# Patient Record
Sex: FEMALE | Race: WHITE | NOT HISPANIC OR LATINO | Employment: OTHER | URBAN - METROPOLITAN AREA
[De-identification: names, ages, dates, MRNs, and addresses within clinical notes are randomized per-mention and may not be internally consistent; named-entity substitution may affect disease eponyms.]

---

## 2023-04-14 RX ORDER — ROSUVASTATIN CALCIUM 20 MG/1
1 TABLET, COATED ORAL DAILY
COMMUNITY
Start: 2022-12-18 | End: 2023-04-25

## 2023-04-14 RX ORDER — ROSUVASTATIN CALCIUM 20 MG/1
TABLET, COATED ORAL
Qty: 90 TABLET | Refills: 0 | Status: CANCELLED | OUTPATIENT
Start: 2023-04-14

## 2023-04-24 RX ORDER — ROSUVASTATIN CALCIUM 20 MG/1
20 TABLET, COATED ORAL DAILY
Status: CANCELLED | OUTPATIENT
Start: 2023-04-24

## 2023-04-25 DIAGNOSIS — E78.5 HYPERLIPIDEMIA, UNSPECIFIED HYPERLIPIDEMIA TYPE: Primary | ICD-10-CM

## 2023-04-25 RX ORDER — ROSUVASTATIN CALCIUM 20 MG/1
TABLET, COATED ORAL
Qty: 15 TABLET | Refills: 0 | Status: SHIPPED | OUTPATIENT
Start: 2023-04-25 | End: 2023-05-16 | Stop reason: SDUPTHER

## 2023-05-11 DIAGNOSIS — E78.5 HYPERLIPIDEMIA, UNSPECIFIED HYPERLIPIDEMIA TYPE: ICD-10-CM

## 2023-05-11 RX ORDER — ROSUVASTATIN CALCIUM 20 MG/1
TABLET, COATED ORAL
Qty: 15 TABLET | Refills: 0 | Status: CANCELLED | OUTPATIENT
Start: 2023-05-11

## 2023-05-12 ENCOUNTER — PATIENT OUTREACH (OUTPATIENT)
Dept: PRIMARY CARE | Facility: CLINIC | Age: 83
End: 2023-05-12
Payer: MEDICARE

## 2023-05-16 DIAGNOSIS — E78.5 HYPERLIPIDEMIA, UNSPECIFIED HYPERLIPIDEMIA TYPE: ICD-10-CM

## 2023-05-16 RX ORDER — ROSUVASTATIN CALCIUM 20 MG/1
20 TABLET, COATED ORAL DAILY
Qty: 30 TABLET | Refills: 0 | Status: SHIPPED | OUTPATIENT
Start: 2023-05-16 | End: 2023-07-24 | Stop reason: SDUPTHER

## 2023-05-25 ENCOUNTER — PATIENT OUTREACH (OUTPATIENT)
Dept: PRIMARY CARE | Facility: CLINIC | Age: 83
End: 2023-05-25
Payer: MEDICARE

## 2023-05-25 ENCOUNTER — TELEPHONE (OUTPATIENT)
Dept: PRIMARY CARE | Facility: CLINIC | Age: 83
End: 2023-05-25
Payer: MEDICARE

## 2023-05-25 NOTE — PROGRESS NOTES
Patient due for annual wellness visit.  Patient not due until sept. Patient having other health issues an waiting for transportation help as well patient has  follow up visit June 1st after hospital discharge

## 2023-06-09 ENCOUNTER — DOCUMENTATION (OUTPATIENT)
Dept: PRIMARY CARE | Facility: CLINIC | Age: 83
End: 2023-06-09
Payer: MEDICARE

## 2023-06-12 ENCOUNTER — PATIENT OUTREACH (OUTPATIENT)
Dept: PRIMARY CARE | Facility: CLINIC | Age: 83
End: 2023-06-12
Payer: MEDICARE

## 2023-06-12 DIAGNOSIS — I73.9 PERIPHERAL VASCULAR DISEASE (CMS-HCC): ICD-10-CM

## 2023-06-12 NOTE — PROGRESS NOTES
TCM complete.  Discharge date 6/8/23   2 attempts were made to reach patient to assess needs.   No return call as of this note.   If patient schedules follow up within 14 days of discharge, visit is TCM billable.  Message sent to practice clinical pool to reach out to patient and schedule an appointment within 7-13 days from discharge date.    If patient meets criteria for moderately complex & has follow-up within 14 days-can bill [id number].   If patient meets criteria for highly complex & has follow-up visit within 7 days-can bill [id number].    *virtual follow up needs modifier added (95 or GT)   *AWV AND TCM CAN BE BILLED TOGETHER WITH 25 MODIFIER

## 2023-06-30 ENCOUNTER — PATIENT OUTREACH (OUTPATIENT)
Dept: PRIMARY CARE | Facility: CLINIC | Age: 83
End: 2023-06-30
Payer: MEDICARE

## 2023-06-30 NOTE — PROGRESS NOTES
Unable to reach patient for call back 14 days post discharge from the hospital.M with call back number for patient to call if needed   If no voicemail available call attempts x 2 were made to contact the patient to assist with any questions or concerns patient may have. Patient did not follow up with their PCP within that time. Patient dis-enrolled from [facility] this date.

## 2023-07-24 DIAGNOSIS — E78.5 HYPERLIPIDEMIA, UNSPECIFIED HYPERLIPIDEMIA TYPE: ICD-10-CM

## 2023-07-24 RX ORDER — ROSUVASTATIN CALCIUM 20 MG/1
20 TABLET, COATED ORAL DAILY
Qty: 30 TABLET | Refills: 0 | Status: SHIPPED | OUTPATIENT
Start: 2023-07-24 | End: 2023-08-09 | Stop reason: ALTCHOICE

## 2023-08-08 ENCOUNTER — APPOINTMENT (OUTPATIENT)
Dept: PRIMARY CARE | Facility: CLINIC | Age: 83
End: 2023-08-08
Payer: MEDICARE

## 2023-08-08 PROBLEM — D53.9 ANEMIA, MACROCYTIC: Status: ACTIVE | Noted: 2023-08-08

## 2023-08-08 PROBLEM — M48.061 LUMBAR CANAL STENOSIS: Status: ACTIVE | Noted: 2023-08-08

## 2023-08-08 PROBLEM — I70.229 CRITICAL LOWER LIMB ISCHEMIA (MULTI): Status: ACTIVE | Noted: 2023-08-08

## 2023-08-08 PROBLEM — K21.9 ESOPHAGEAL REFLUX: Status: ACTIVE | Noted: 2023-08-08

## 2023-08-08 PROBLEM — E78.5 HYPERLIPIDEMIA: Status: ACTIVE | Noted: 2023-08-08

## 2023-08-08 PROBLEM — L30.9 DERMATITIS: Status: ACTIVE | Noted: 2023-08-08

## 2023-08-08 PROBLEM — C79.51 BREAST CANCER METASTASIZED TO BONE (MULTI): Status: ACTIVE | Noted: 2023-08-08

## 2023-08-08 PROBLEM — M12.9 ARTHRITIS, MULTIPLE JOINT INVOLVEMENT: Status: ACTIVE | Noted: 2023-08-08

## 2023-08-08 PROBLEM — R60.0 EDEMA LEG: Status: ACTIVE | Noted: 2023-08-08

## 2023-08-08 PROBLEM — L97.529 ULCER OF LEFT FOOT (MULTI): Status: RESOLVED | Noted: 2023-08-08 | Resolved: 2023-08-08

## 2023-08-08 PROBLEM — Z98.890 STATUS POST LEFT BREAST LUMPECTOMY: Status: ACTIVE | Noted: 2023-08-08

## 2023-08-08 PROBLEM — I87.2 CHRONIC VENOUS INSUFFICIENCY: Status: ACTIVE | Noted: 2023-08-08

## 2023-08-08 PROBLEM — M48.02 CERVICAL SPINAL STENOSIS: Status: ACTIVE | Noted: 2023-08-08

## 2023-08-08 PROBLEM — G47.33 OBSTRUCTIVE SLEEP APNEA, ADULT: Status: ACTIVE | Noted: 2023-08-08

## 2023-08-08 PROBLEM — C50.919 BREAST CANCER METASTASIZED TO BONE (MULTI): Status: ACTIVE | Noted: 2023-08-08

## 2023-08-08 PROBLEM — M54.32 SCIATICA OF LEFT SIDE: Status: ACTIVE | Noted: 2023-08-08

## 2023-08-08 PROBLEM — L85.3 XEROSIS OF SKIN: Status: ACTIVE | Noted: 2023-08-08

## 2023-08-08 PROBLEM — I50.32 CHRONIC DIASTOLIC CONGESTIVE HEART FAILURE (MULTI): Status: ACTIVE | Noted: 2023-08-08

## 2023-08-08 PROBLEM — I10 BENIGN ESSENTIAL HYPERTENSION: Status: ACTIVE | Noted: 2023-08-08

## 2023-08-08 PROBLEM — R26.89 BALANCE DISORDER: Status: ACTIVE | Noted: 2023-08-08

## 2023-08-08 PROBLEM — I73.9 PERIPHERAL VASCULAR DISEASE (CMS-HCC): Status: ACTIVE | Noted: 2023-08-08

## 2023-08-08 PROBLEM — H40.9 MILD STAGE GLAUCOMA: Status: ACTIVE | Noted: 2023-08-08

## 2023-08-08 PROBLEM — Z96.1 PSEUDOPHAKIA, BOTH EYES: Status: ACTIVE | Noted: 2023-08-08

## 2023-08-08 PROBLEM — C77.3 CARCINOMA OF LEFT BREAST METASTATIC TO AXILLARY LYMPH NODE (MULTI): Status: ACTIVE | Noted: 2023-08-08

## 2023-08-08 PROBLEM — C50.912 CARCINOMA OF LEFT BREAST METASTATIC TO AXILLARY LYMPH NODE (MULTI): Status: ACTIVE | Noted: 2023-08-08

## 2023-08-08 PROBLEM — I70.25: Status: ACTIVE | Noted: 2023-08-08

## 2023-08-08 PROBLEM — G62.9 PERIPHERAL POLYNEUROPATHY: Status: ACTIVE | Noted: 2023-08-08

## 2023-08-08 PROBLEM — R06.02 SHORTNESS OF BREATH: Status: RESOLVED | Noted: 2023-08-08 | Resolved: 2023-08-08

## 2023-08-09 ENCOUNTER — OFFICE VISIT (OUTPATIENT)
Dept: PRIMARY CARE | Facility: CLINIC | Age: 83
End: 2023-08-09
Payer: MEDICARE

## 2023-08-09 VITALS
HEIGHT: 70 IN | OXYGEN SATURATION: 94 % | HEART RATE: 85 BPM | DIASTOLIC BLOOD PRESSURE: 54 MMHG | SYSTOLIC BLOOD PRESSURE: 104 MMHG | BODY MASS INDEX: 30.13 KG/M2

## 2023-08-09 DIAGNOSIS — G89.3 PAIN, CANCER: ICD-10-CM

## 2023-08-09 DIAGNOSIS — R73.9 HYPERGLYCEMIA: ICD-10-CM

## 2023-08-09 DIAGNOSIS — E78.5 HYPERLIPIDEMIA, UNSPECIFIED HYPERLIPIDEMIA TYPE: ICD-10-CM

## 2023-08-09 DIAGNOSIS — R26.89 BALANCE DISORDER: ICD-10-CM

## 2023-08-09 DIAGNOSIS — I50.32 CHRONIC DIASTOLIC CONGESTIVE HEART FAILURE (MULTI): ICD-10-CM

## 2023-08-09 DIAGNOSIS — I73.9 PERIPHERAL VASCULAR DISEASE (CMS-HCC): ICD-10-CM

## 2023-08-09 DIAGNOSIS — C50.919 CARCINOMA OF BREAST METASTATIC TO BONE, UNSPECIFIED LATERALITY (MULTI): Primary | ICD-10-CM

## 2023-08-09 DIAGNOSIS — C79.51 CARCINOMA OF BREAST METASTATIC TO BONE, UNSPECIFIED LATERALITY (MULTI): Primary | ICD-10-CM

## 2023-08-09 DIAGNOSIS — I10 BENIGN ESSENTIAL HYPERTENSION: ICD-10-CM

## 2023-08-09 PROCEDURE — 1036F TOBACCO NON-USER: CPT | Performed by: INTERNAL MEDICINE

## 2023-08-09 PROCEDURE — 1125F AMNT PAIN NOTED PAIN PRSNT: CPT | Performed by: INTERNAL MEDICINE

## 2023-08-09 PROCEDURE — 1159F MED LIST DOCD IN RCRD: CPT | Performed by: INTERNAL MEDICINE

## 2023-08-09 PROCEDURE — 1160F RVW MEDS BY RX/DR IN RCRD: CPT | Performed by: INTERNAL MEDICINE

## 2023-08-09 PROCEDURE — 3078F DIAST BP <80 MM HG: CPT | Performed by: INTERNAL MEDICINE

## 2023-08-09 PROCEDURE — 99214 OFFICE O/P EST MOD 30 MIN: CPT | Performed by: INTERNAL MEDICINE

## 2023-08-09 PROCEDURE — 3074F SYST BP LT 130 MM HG: CPT | Performed by: INTERNAL MEDICINE

## 2023-08-09 RX ORDER — ROSUVASTATIN CALCIUM 20 MG/1
1 TABLET, COATED ORAL DAILY
COMMUNITY
Start: 2020-08-17 | End: 2023-08-09 | Stop reason: ALTCHOICE

## 2023-08-09 RX ORDER — CLOPIDOGREL BISULFATE 75 MG/1
1 TABLET ORAL DAILY
Status: ON HOLD | COMMUNITY
Start: 2022-09-13 | End: 2023-10-16 | Stop reason: WASHOUT

## 2023-08-09 RX ORDER — METOPROLOL SUCCINATE 50 MG/1
50 TABLET, EXTENDED RELEASE ORAL DAILY
COMMUNITY
End: 2023-10-25 | Stop reason: HOSPADM

## 2023-08-09 RX ORDER — OXYBUTYNIN CHLORIDE 5 MG/1
1 TABLET, EXTENDED RELEASE ORAL DAILY
Status: ON HOLD | COMMUNITY
End: 2023-10-16 | Stop reason: WASHOUT

## 2023-08-09 RX ORDER — OXYCODONE HYDROCHLORIDE 5 MG/1
5 TABLET ORAL EVERY 6 HOURS PRN
Qty: 120 TABLET | Refills: 0 | Status: SHIPPED | OUTPATIENT
Start: 2023-08-09 | End: 2023-09-08

## 2023-08-09 RX ORDER — KETOROLAC TROMETHAMINE 5 MG/ML
SOLUTION OPHTHALMIC
Status: ON HOLD | COMMUNITY
Start: 2022-10-18 | End: 2023-10-16 | Stop reason: WASHOUT

## 2023-08-09 RX ORDER — FOLIC ACID/MULTIVIT,IRON,MINER 0.4MG-18MG
800 TABLET ORAL
Qty: 180 TABLET | Refills: 3 | Status: SHIPPED | OUTPATIENT
Start: 2023-08-09 | End: 2023-11-30

## 2023-08-09 RX ORDER — AMLODIPINE BESYLATE 5 MG/1
1 TABLET ORAL DAILY
Status: ON HOLD | COMMUNITY
Start: 2022-09-13 | End: 2023-10-16 | Stop reason: WASHOUT

## 2023-08-09 RX ORDER — ACETAMINOPHEN 500 MG
1 TABLET ORAL DAILY
COMMUNITY
End: 2023-09-06 | Stop reason: ENTERED-IN-ERROR

## 2023-08-09 RX ORDER — PANTOPRAZOLE SODIUM 40 MG/1
TABLET, DELAYED RELEASE ORAL
Status: ON HOLD | COMMUNITY
Start: 2022-09-11 | End: 2023-10-16 | Stop reason: WASHOUT

## 2023-08-09 RX ORDER — ROSUVASTATIN CALCIUM 20 MG/1
20 TABLET, COATED ORAL DAILY
Qty: 90 TABLET | Refills: 0 | Status: SHIPPED | OUTPATIENT
Start: 2023-08-09 | End: 2023-11-30

## 2023-08-09 RX ORDER — LATANOPROST 50 UG/ML
SOLUTION/ DROPS OPHTHALMIC
COMMUNITY
End: 2023-10-11 | Stop reason: SDUPTHER

## 2023-08-09 ASSESSMENT — ENCOUNTER SYMPTOMS
CONSTITUTIONAL NEGATIVE: 1
LOSS OF SENSATION IN FEET: 0
DEPRESSION: 0
OCCASIONAL FEELINGS OF UNSTEADINESS: 1

## 2023-08-09 ASSESSMENT — PATIENT HEALTH QUESTIONNAIRE - PHQ9
1. LITTLE INTEREST OR PLEASURE IN DOING THINGS: NOT AT ALL
SUM OF ALL RESPONSES TO PHQ9 QUESTIONS 1 AND 2: 0
2. FEELING DOWN, DEPRESSED OR HOPELESS: NOT AT ALL

## 2023-08-09 ASSESSMENT — LIFESTYLE VARIABLES
SKIP TO QUESTIONS 9-10: 0
HOW MANY STANDARD DRINKS CONTAINING ALCOHOL DO YOU HAVE ON A TYPICAL DAY: 1 OR 2
HOW OFTEN DO YOU HAVE A DRINK CONTAINING ALCOHOL: MONTHLY OR LESS
AUDIT-C TOTAL SCORE: 2
HOW OFTEN DO YOU HAVE SIX OR MORE DRINKS ON ONE OCCASION: LESS THAN MONTHLY

## 2023-08-09 NOTE — PATIENT INSTRUCTIONS

## 2023-08-09 NOTE — PROGRESS NOTES
"Subjective   Patient ID: Samira López is a 82 y.o. female who presents for Follow-up (refills).  HPI    FU FOR   REFILLS.   WAS ADM  TO HOSPITAL  NAD NEVER HAD DISCHARGE FOLLOW UP.   NON HEALING FOOT  ULCER  .  PROB  BYPASS  REQUIRED   SEEING WOUND CARE ONCE A WEEK.   SEES SEL[facility] GRISSOM.  SHE ALREADY HAS A STENT BUT DOESN'T THINK IT IS FUNCTIONING.       BREAST CA WITH BONE MTS TO RIBS, HIP AND  VERTEBRA.  She reprots her cancer is not getting worse.   SHE WANTS  OXYCODONE FOR PAIN.       Review of Systems   Constitutional: Negative.    All other systems reviewed and are negative.      Objective     BP Readings from Last 3 Encounters:   08/09/23 104/54   01/12/23 126/64   12/13/22 (!) 96/44      Wt Readings from Last 3 Encounters:   01/12/23 95.3 kg (210 lb)   12/13/22 95.3 kg (210 lb)   10/25/22 98.4 kg (217 lb)      BMI: Estimated body mass index is 30.13 kg/m² as calculated from the following:    Height as of this encounter: 1.778 m (5' 10\").    Weight as of 1/12/23: 95.3 kg (210 lb).    BSA: Estimated body surface area is 2.17 meters squared as calculated from the following:    Height as of this encounter: 1.778 m (5' 10\").    Weight as of 1/12/23: 95.3 kg (210 lb).  Physical Exam  Vitals and nursing note reviewed.   Constitutional:       Appearance: Normal appearance.   HENT:      Head: Normocephalic and atraumatic.      Nose: Nose normal.   Eyes:      Conjunctiva/sclera: Conjunctivae normal.   Pulmonary:      Effort: Pulmonary effort is normal.   Skin:     General: Skin is dry.   Neurological:      General: No focal deficit present.      Mental Status: She is alert and oriented to person, place, and time. Mental status is at baseline.   Psychiatric:         Mood and Affect: Mood normal.         Behavior: Behavior normal.         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 (CMS/HCC) - Primary    Relevant Medications    oxyCODONE (Roxicodone) 5 mg immediate release tablet    Chronic diastolic congestive heart failure (CMS/HCC)    Relevant Medications    amLODIPine (Norvasc) 5 mg tablet    clopidogrel (Plavix) 75 mg tablet    metoprolol succinate XL (Toprol-XL) 50 mg 24 hr tablet    Hyperlipidemia    Relevant Medications    rosuvastatin (Crestor) 20 mg tablet    Peripheral vascular disease (CMS/HCC)     Other Visit Diagnoses       Pain, cancer        Relevant Medications    oxyCODONE (Roxicodone) 5 mg immediate release tablet    Hyperglycemia        Relevant Orders    Hemoglobin A1c    Basic metabolic panel           Patient was identified as a fall risk. Risk prevention instructions provided.    Chronic problems controlled  on current treatment  unless otherwise noted.     CHART  REVIEWED AND  RECONCILED WITH OLD DATA / UPDATED IN NEW SYSTEM:  MEDS,  PROBLEMS,  ALLERGIES, SOC HISTORY.

## 2023-09-06 PROBLEM — E66.09 CLASS 1 OBESITY DUE TO EXCESS CALORIES WITH BODY MASS INDEX (BMI) OF 31.0 TO 31.9 IN ADULT: Status: ACTIVE | Noted: 2023-09-06

## 2023-09-06 PROBLEM — I95.9 LOW BLOOD PRESSURE: Status: ACTIVE | Noted: 2023-09-06

## 2023-09-06 PROBLEM — N18.30 STAGE 3 CHRONIC KIDNEY DISEASE (MULTI): Status: ACTIVE | Noted: 2022-06-23

## 2023-09-06 PROBLEM — E11.622 TYPE 2 DIABETES MELLITUS WITH ULCER (MULTI): Status: ACTIVE | Noted: 2023-09-06

## 2023-09-06 PROBLEM — J96.11 CHRONIC RESPIRATORY FAILURE WITH HYPOXIA (MULTI): Status: ACTIVE | Noted: 2023-09-06

## 2023-09-06 PROBLEM — I96 GANGRENE OF FOOT (MULTI): Status: ACTIVE | Noted: 2023-09-06

## 2023-09-06 PROBLEM — S81.809A OPEN WOUND OF KNEE, LEG (EXCEPT THIGH), AND ANKLE: Status: ACTIVE | Noted: 2023-09-06

## 2023-09-06 PROBLEM — E11.69 DIABETES MELLITUS TYPE 2 IN OBESE: Status: ACTIVE | Noted: 2023-09-06

## 2023-09-06 PROBLEM — L03.116 CELLULITIS OF LEFT LOWER LIMB: Status: ACTIVE | Noted: 2022-06-23

## 2023-09-06 PROBLEM — Z91.81 HISTORY OF FALLING: Status: ACTIVE | Noted: 2022-06-23

## 2023-09-06 PROBLEM — E11.51 TYPE 2 DM WITH DIABETIC PERIPHERAL ANGIOPATHY W/O GANGRENE (MULTI): Status: ACTIVE | Noted: 2022-06-23

## 2023-09-06 PROBLEM — Z79.01 LONG TERM (CURRENT) USE OF ANTICOAGULANTS: Status: ACTIVE | Noted: 2022-06-23

## 2023-09-06 PROBLEM — E78.5 HYPERLIPIDEMIA, UNSPECIFIED: Status: ACTIVE | Noted: 2022-06-23

## 2023-09-06 PROBLEM — M15.0 PRIMARY GENERALIZED (OSTEO)ARTHRITIS: Status: ACTIVE | Noted: 2022-06-23

## 2023-09-06 PROBLEM — Z87.891 PERSONAL HISTORY OF NICOTINE DEPENDENCE: Status: ACTIVE | Noted: 2022-06-23

## 2023-09-06 PROBLEM — H40.1131 PRIMARY OPEN ANGLE GLAUCOMA (POAG) OF BOTH EYES, MILD STAGE: Status: ACTIVE | Noted: 2023-09-06

## 2023-09-06 PROBLEM — I95.9 HYPOTENSION: Status: ACTIVE | Noted: 2023-09-06

## 2023-09-06 PROBLEM — H40.9 GLAUCOMA: Status: ACTIVE | Noted: 2022-06-23

## 2023-09-06 PROBLEM — R93.89 ABNORMAL COMPUTED TOMOGRAPHY SCAN: Status: ACTIVE | Noted: 2023-09-06

## 2023-09-06 PROBLEM — R68.89 COLD FEELING: Status: ACTIVE | Noted: 2023-09-06

## 2023-09-06 PROBLEM — E11.22 TYPE 2 DIABETES MELLITUS WITH DIABETIC CHRONIC KIDNEY DISEASE (MULTI): Status: ACTIVE | Noted: 2022-06-23

## 2023-09-06 PROBLEM — M62.81 MUSCLE WEAKNESS: Status: ACTIVE | Noted: 2023-05-24

## 2023-09-06 PROBLEM — I50.32 CHRONIC DIASTOLIC (CONGESTIVE) HEART FAILURE (MULTI): Status: ACTIVE | Noted: 2022-06-23

## 2023-09-06 PROBLEM — S81.831D: Status: ACTIVE | Noted: 2023-09-06

## 2023-09-06 PROBLEM — I99.8 LIMB ISCHEMIA: Status: ACTIVE | Noted: 2023-09-06

## 2023-09-06 PROBLEM — C79.51 SECONDARY MALIGNANT NEOPLASM OF BONE (MULTI): Status: ACTIVE | Noted: 2022-06-23

## 2023-09-06 PROBLEM — I26.99 OTHER PULMONARY EMBOLISM WITHOUT ACUTE COR PULMONALE (MULTI): Status: ACTIVE | Noted: 2022-06-23

## 2023-09-06 PROBLEM — I87.8 VENOUS STASIS: Status: ACTIVE | Noted: 2023-09-06

## 2023-09-06 PROBLEM — L02.416 CUTANEOUS ABSCESS OF LEFT LOWER LIMB: Status: ACTIVE | Noted: 2022-06-23

## 2023-09-06 PROBLEM — S91.009A OPEN WOUND OF KNEE, LEG (EXCEPT THIGH), AND ANKLE: Status: ACTIVE | Noted: 2023-09-06

## 2023-09-06 PROBLEM — L98.499 TYPE 2 DIABETES MELLITUS WITH ULCER (MULTI): Status: ACTIVE | Noted: 2023-09-06

## 2023-09-06 PROBLEM — L85.3 ASTEATOSIS CUTIS: Status: ACTIVE | Noted: 2023-09-06

## 2023-09-06 PROBLEM — L02.419 CELLULITIS AND ABSCESS OF LOWER EXTREMITY: Status: ACTIVE | Noted: 2023-09-06

## 2023-09-06 PROBLEM — R09.02 HYPOXIA: Status: ACTIVE | Noted: 2023-09-06

## 2023-09-06 PROBLEM — Z85.3 HISTORY OF MALIGNANT NEOPLASM OF BREAST: Status: ACTIVE | Noted: 2023-09-06

## 2023-09-06 PROBLEM — S91.332A: Status: ACTIVE | Noted: 2023-09-06

## 2023-09-06 PROBLEM — L97.522 NON-PRESSURE CHRONIC ULCER OF OTHER PART OF LEFT FOOT WITH FAT LAYER EXPOSED (MULTI): Status: ACTIVE | Noted: 2023-09-06

## 2023-09-06 PROBLEM — Z98.890 OTHER SPECIFIED POSTPROCEDURAL STATES: Status: ACTIVE | Noted: 2022-06-23

## 2023-09-06 PROBLEM — M79.673 FOOT PAIN: Status: ACTIVE | Noted: 2023-09-06

## 2023-09-06 PROBLEM — I73.9 PERIPHERAL ARTERIAL DISEASE (CMS-HCC): Status: ACTIVE | Noted: 2023-09-06

## 2023-09-06 PROBLEM — B37.2 CANDIDAL INTERTRIGO: Status: ACTIVE | Noted: 2023-09-06

## 2023-09-06 PROBLEM — D63.1 ANEMIA DUE TO CHRONIC KIDNEY DISEASE: Status: ACTIVE | Noted: 2022-06-23

## 2023-09-06 PROBLEM — M25.559 HIP PAIN: Status: ACTIVE | Noted: 2023-09-06

## 2023-09-06 PROBLEM — C77.3 SECONDARY AND UNSPECIFIED MALIGNANT NEOPLASM OF AXILLA AND UPPER LIMB LYMPH NODES (MULTI): Status: ACTIVE | Noted: 2022-06-23

## 2023-09-06 PROBLEM — S90.529A BLISTER OF ANKLE: Status: ACTIVE | Noted: 2023-09-06

## 2023-09-06 PROBLEM — S80.829A BLISTER OF LEG: Status: ACTIVE | Noted: 2023-09-06

## 2023-09-06 PROBLEM — D05.02 LOBULAR CARCINOMA IN SITU OF LEFT BREAST: Status: ACTIVE | Noted: 2022-06-23

## 2023-09-06 PROBLEM — G92.8 TOXIC METABOLIC ENCEPHALOPATHY: Status: ACTIVE | Noted: 2023-09-06

## 2023-09-06 PROBLEM — Z86.711 HISTORY OF PULMONARY EMBOLISM: Status: ACTIVE | Noted: 2023-09-06

## 2023-09-06 PROBLEM — C50.919 MALIGNANT NEOPLASM OF UNSPECIFIED SITE OF UNSPECIFIED FEMALE BREAST (MULTI): Status: ACTIVE | Noted: 2022-06-23

## 2023-09-06 PROBLEM — Z51.5 PALLIATIVE CARE STATUS: Status: ACTIVE | Noted: 2023-09-06

## 2023-09-06 PROBLEM — D53.9 NUTRITIONAL ANEMIA: Status: ACTIVE | Noted: 2022-06-23

## 2023-09-06 PROBLEM — E66.9 OBESITY: Status: ACTIVE | Noted: 2022-06-23

## 2023-09-06 PROBLEM — I13.0 HYPERTENSIVE HEART AND CHRONIC KIDNEY DISEASE WITH HEART FAILURE AND STAGE 1 THROUGH STAGE 4 CHRONIC KIDNEY DISEASE, OR CHRONIC KIDNEY DISEASE (MULTI): Status: ACTIVE | Noted: 2022-06-23

## 2023-09-06 PROBLEM — F41.9 ANXIETY: Status: ACTIVE | Noted: 2023-09-06

## 2023-09-06 PROBLEM — C44.92 SCC (SQUAMOUS CELL CARCINOMA): Status: ACTIVE | Noted: 2023-09-06

## 2023-09-06 PROBLEM — A41.9 SEPSIS (MULTI): Status: ACTIVE | Noted: 2023-09-06

## 2023-09-06 PROBLEM — M79.662 PAIN OF LEFT LOWER LEG: Status: ACTIVE | Noted: 2023-09-06

## 2023-09-06 PROBLEM — M79.676 PAIN IN TOE: Status: ACTIVE | Noted: 2023-09-06

## 2023-09-06 PROBLEM — R53.1 ASTHENIA: Status: ACTIVE | Noted: 2023-09-06

## 2023-09-06 PROBLEM — S81.009A OPEN WOUND OF KNEE, LEG (EXCEPT THIGH), AND ANKLE: Status: ACTIVE | Noted: 2023-09-06

## 2023-09-06 PROBLEM — L03.119 CELLULITIS AND ABSCESS OF LOWER EXTREMITY: Status: ACTIVE | Noted: 2023-09-06

## 2023-09-06 PROBLEM — D61.818 PANCYTOPENIA (MULTI): Status: ACTIVE | Noted: 2023-09-06

## 2023-09-06 PROBLEM — L03.90 CELLULITIS: Status: ACTIVE | Noted: 2023-09-06

## 2023-09-06 PROBLEM — R06.00 DYSPNEA: Status: ACTIVE | Noted: 2023-09-06

## 2023-09-06 PROBLEM — G47.33 OSA (OBSTRUCTIVE SLEEP APNEA): Status: ACTIVE | Noted: 2022-06-23
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
## 2023-09-06 PROBLEM — H40.003 GLAUCOMA SUSPECT, BOTH EYES: Status: ACTIVE | Noted: 2023-09-06

## 2023-09-06 PROBLEM — J44.9 CHRONIC OBSTRUCTIVE LUNG DISEASE (MULTI): Status: ACTIVE | Noted: 2023-09-06

## 2023-09-06 PROBLEM — R60.0 EDEMA OF LOWER EXTREMITY: Status: ACTIVE | Noted: 2023-09-06

## 2023-09-06 PROBLEM — M54.32 SCIATICA, LEFT SIDE: Status: ACTIVE | Noted: 2022-06-23

## 2023-09-06 PROBLEM — E66.9 DIABETES MELLITUS TYPE 2 IN OBESE: Status: ACTIVE | Noted: 2023-09-06

## 2023-09-06 PROBLEM — L97.529 ULCER OF LEFT FOOT (MULTI): Status: ACTIVE | Noted: 2023-09-06

## 2023-09-06 PROBLEM — E87.5 HYPERKALEMIA: Status: ACTIVE | Noted: 2023-09-06

## 2023-09-06 PROBLEM — I96 GANGRENE (MULTI): Status: ACTIVE | Noted: 2023-09-06

## 2023-09-06 PROBLEM — H40.1132 PRIMARY OPEN ANGLE GLAUCOMA (POAG) OF BOTH EYES, MODERATE STAGE: Status: ACTIVE | Noted: 2023-09-06

## 2023-09-06 PROBLEM — N18.9 ANEMIA DUE TO CHRONIC KIDNEY DISEASE: Status: ACTIVE | Noted: 2022-06-23

## 2023-09-06 RX ORDER — CLOBETASOL PROPIONATE 0.5 MG/G
1 OINTMENT TOPICAL DAILY
Status: ON HOLD | COMMUNITY
End: 2023-10-16 | Stop reason: WASHOUT

## 2023-09-06 RX ORDER — LISINOPRIL 20 MG/1
1 TABLET ORAL DAILY
Status: ON HOLD | COMMUNITY
Start: 2023-05-30 | End: 2023-10-16 | Stop reason: WASHOUT

## 2023-09-06 RX ORDER — AMOXICILLIN 500 MG/1
1 CAPSULE ORAL 2 TIMES DAILY
Status: ON HOLD | COMMUNITY
Start: 2023-01-09 | End: 2023-10-16 | Stop reason: WASHOUT

## 2023-09-06 RX ORDER — NAPROXEN SODIUM 220 MG/1
TABLET ORAL
Status: ON HOLD | COMMUNITY
End: 2023-10-16 | Stop reason: WASHOUT

## 2023-09-06 RX ORDER — BIOTIN 1 MG
TABLET ORAL DAILY
Status: ON HOLD | COMMUNITY
End: 2023-10-16 | Stop reason: WASHOUT

## 2023-09-06 RX ORDER — DORZOLAMIDE HCL 20 MG/ML
1 SOLUTION/ DROPS OPHTHALMIC 2 TIMES DAILY
COMMUNITY
Start: 2023-07-21 | End: 2023-10-11 | Stop reason: SDUPTHER

## 2023-09-06 RX ORDER — HYDROGEN PEROXIDE 3 %
1 SOLUTION, NON-ORAL MISCELLANEOUS DAILY
COMMUNITY
Start: 2022-04-10 | End: 2023-11-07 | Stop reason: ENTERED-IN-ERROR

## 2023-09-06 RX ORDER — ACETAMINOPHEN 500 MG
1 TABLET ORAL DAILY
Status: ON HOLD | COMMUNITY
Start: 2022-04-10 | End: 2023-10-16 | Stop reason: WASHOUT

## 2023-09-06 RX ORDER — BRIMONIDINE TARTRATE 2 MG/ML
SOLUTION/ DROPS OPHTHALMIC EVERY 12 HOURS
COMMUNITY
Start: 2022-02-24 | End: 2023-10-11

## 2023-09-06 RX ORDER — CIPROFLOXACIN 500 MG/1
1 TABLET ORAL EVERY 12 HOURS
Status: ON HOLD | COMMUNITY
Start: 2023-03-31 | End: 2023-10-16 | Stop reason: WASHOUT

## 2023-09-06 RX ORDER — OXYBUTYNIN CHLORIDE 10 MG/1
1 TABLET, EXTENDED RELEASE ORAL DAILY
COMMUNITY
Start: 2023-04-23

## 2023-09-06 RX ORDER — NAPROXEN SODIUM 220 MG/1
1 TABLET, FILM COATED ORAL DAILY
Status: ON HOLD | COMMUNITY
Start: 2022-04-10 | End: 2023-10-16 | Stop reason: WASHOUT

## 2023-09-06 RX ORDER — HYOSCYAMINE SULFATE 16 OZ
SOLUTION MISCELLANEOUS
Status: ON HOLD | COMMUNITY
Start: 2023-05-05 | End: 2023-10-16 | Stop reason: WASHOUT

## 2023-09-06 RX ORDER — PALBOCICLIB 100 MG/1
100 TABLET, FILM COATED ORAL
Status: ON HOLD | COMMUNITY
Start: 2022-12-26 | End: 2023-10-16 | Stop reason: WASHOUT

## 2023-09-06 RX ORDER — FUROSEMIDE 20 MG/1
2 TABLET ORAL DAILY
Status: ON HOLD | COMMUNITY
Start: 2022-04-04 | End: 2023-10-16 | Stop reason: WASHOUT

## 2023-09-06 RX ORDER — TROSPIUM CHLORIDE ER 60 MG/1
1 CAPSULE ORAL DAILY
Status: ON HOLD | COMMUNITY
End: 2023-10-16 | Stop reason: WASHOUT

## 2023-09-06 RX ORDER — QUINIDINE GLUCONATE 324 MG
1 TABLET, EXTENDED RELEASE ORAL 2 TIMES DAILY
Status: ON HOLD | COMMUNITY
Start: 2022-04-01 | End: 2023-10-16 | Stop reason: WASHOUT

## 2023-09-06 RX ORDER — PSEUDOEPHEDRINE HCL 30 MG
2 TABLET ORAL DAILY
Status: ON HOLD | COMMUNITY
Start: 2020-06-01 | End: 2023-10-16 | Stop reason: WASHOUT

## 2023-09-26 DIAGNOSIS — C79.51 CARCINOMA OF BREAST METASTATIC TO BONE, UNSPECIFIED LATERALITY (MULTI): Primary | ICD-10-CM

## 2023-09-26 DIAGNOSIS — C50.919 CARCINOMA OF BREAST METASTATIC TO BONE, UNSPECIFIED LATERALITY (MULTI): Primary | ICD-10-CM

## 2023-09-26 RX ORDER — LAMOTRIGINE 25 MG/1
500 TABLET ORAL ONCE
Status: CANCELLED | OUTPATIENT
Start: 2023-11-01

## 2023-09-26 RX ORDER — HEPARIN SODIUM,PORCINE/PF 10 UNIT/ML
50 SYRINGE (ML) INTRAVENOUS AS NEEDED
Status: CANCELLED | OUTPATIENT
Start: 2023-10-04

## 2023-09-26 RX ORDER — FAMOTIDINE 10 MG/ML
20 INJECTION INTRAVENOUS ONCE AS NEEDED
Status: CANCELLED | OUTPATIENT
Start: 2023-10-04

## 2023-09-26 RX ORDER — PROCHLORPERAZINE EDISYLATE 5 MG/ML
10 INJECTION INTRAMUSCULAR; INTRAVENOUS EVERY 6 HOURS PRN
Status: CANCELLED | OUTPATIENT
Start: 2023-10-04

## 2023-09-26 RX ORDER — DIPHENHYDRAMINE HYDROCHLORIDE 50 MG/ML
50 INJECTION INTRAMUSCULAR; INTRAVENOUS AS NEEDED
Status: CANCELLED | OUTPATIENT
Start: 2023-10-04

## 2023-09-26 RX ORDER — ALBUTEROL SULFATE 0.83 MG/ML
3 SOLUTION RESPIRATORY (INHALATION) AS NEEDED
Status: CANCELLED | OUTPATIENT
Start: 2023-11-01

## 2023-09-26 RX ORDER — FAMOTIDINE 10 MG/ML
20 INJECTION INTRAVENOUS ONCE AS NEEDED
Status: CANCELLED | OUTPATIENT
Start: 2023-11-01

## 2023-09-26 RX ORDER — LAMOTRIGINE 25 MG/1
500 TABLET ORAL ONCE
Status: CANCELLED | OUTPATIENT
Start: 2023-10-04

## 2023-09-26 RX ORDER — PROCHLORPERAZINE EDISYLATE 5 MG/ML
10 INJECTION INTRAMUSCULAR; INTRAVENOUS EVERY 6 HOURS PRN
Status: CANCELLED | OUTPATIENT
Start: 2023-11-01

## 2023-09-26 RX ORDER — DIPHENHYDRAMINE HYDROCHLORIDE 50 MG/ML
50 INJECTION INTRAMUSCULAR; INTRAVENOUS AS NEEDED
Status: CANCELLED | OUTPATIENT
Start: 2023-11-01

## 2023-09-26 RX ORDER — PROCHLORPERAZINE MALEATE 10 MG
10 TABLET ORAL EVERY 6 HOURS PRN
Status: CANCELLED | OUTPATIENT
Start: 2023-10-04

## 2023-09-26 RX ORDER — HEPARIN 100 UNIT/ML
500 SYRINGE INTRAVENOUS AS NEEDED
Status: CANCELLED | OUTPATIENT
Start: 2023-10-04

## 2023-09-26 RX ORDER — ALBUTEROL SULFATE 0.83 MG/ML
3 SOLUTION RESPIRATORY (INHALATION) AS NEEDED
Status: CANCELLED | OUTPATIENT
Start: 2023-10-04

## 2023-09-26 RX ORDER — PROCHLORPERAZINE MALEATE 10 MG
10 TABLET ORAL EVERY 6 HOURS PRN
Status: CANCELLED | OUTPATIENT
Start: 2023-11-01

## 2023-09-26 RX ORDER — EPINEPHRINE 0.3 MG/.3ML
0.3 INJECTION SUBCUTANEOUS EVERY 5 MIN PRN
Status: CANCELLED | OUTPATIENT
Start: 2023-11-01

## 2023-09-26 RX ORDER — EPINEPHRINE 0.3 MG/.3ML
0.3 INJECTION SUBCUTANEOUS EVERY 5 MIN PRN
Status: CANCELLED | OUTPATIENT
Start: 2023-10-04

## 2023-10-04 ENCOUNTER — OFFICE VISIT (OUTPATIENT)
Dept: HEMATOLOGY/ONCOLOGY | Facility: CLINIC | Age: 83
End: 2023-10-04
Payer: MEDICARE

## 2023-10-04 ENCOUNTER — INFUSION (OUTPATIENT)
Dept: HEMATOLOGY/ONCOLOGY | Facility: CLINIC | Age: 83
End: 2023-10-04
Payer: MEDICARE

## 2023-10-04 VITALS
TEMPERATURE: 97.2 F | DIASTOLIC BLOOD PRESSURE: 61 MMHG | RESPIRATION RATE: 18 BRPM | SYSTOLIC BLOOD PRESSURE: 139 MMHG | OXYGEN SATURATION: 99 % | HEART RATE: 71 BPM

## 2023-10-04 DIAGNOSIS — D63.1 ANEMIA DUE TO CHRONIC KIDNEY DISEASE, UNSPECIFIED CKD STAGE: Primary | ICD-10-CM

## 2023-10-04 DIAGNOSIS — C50.919 CARCINOMA OF BREAST METASTATIC TO BONE, UNSPECIFIED LATERALITY (MULTI): ICD-10-CM

## 2023-10-04 DIAGNOSIS — D53.9 ANEMIA, MACROCYTIC: ICD-10-CM

## 2023-10-04 DIAGNOSIS — N18.9 ANEMIA DUE TO CHRONIC KIDNEY DISEASE, UNSPECIFIED CKD STAGE: Primary | ICD-10-CM

## 2023-10-04 DIAGNOSIS — C79.51 CARCINOMA OF BREAST METASTATIC TO BONE, UNSPECIFIED LATERALITY (MULTI): ICD-10-CM

## 2023-10-04 DIAGNOSIS — M89.9 DISORDER OF BONE, UNSPECIFIED: ICD-10-CM

## 2023-10-04 LAB
25(OH)D3 SERPL-MCNC: 40 NG/ML (ref 30–100)
ALBUMIN SERPL BCP-MCNC: 3 G/DL (ref 3.4–5)
ALP SERPL-CCNC: 61 U/L (ref 33–136)
ALT SERPL W P-5'-P-CCNC: 8 U/L (ref 7–45)
ANION GAP SERPL CALC-SCNC: 10 MMOL/L (ref 10–20)
AST SERPL W P-5'-P-CCNC: 14 U/L (ref 9–39)
BASOPHILS # BLD AUTO: 0.1 X10*3/UL (ref 0–0.1)
BASOPHILS NFR BLD AUTO: 2.1 %
BILIRUB SERPL-MCNC: 0.3 MG/DL (ref 0–1.2)
BUN SERPL-MCNC: 6 MG/DL (ref 6–23)
CALCIUM SERPL-MCNC: 9.1 MG/DL (ref 8.6–10.3)
CANCER AG27-29 SERPL-ACNC: 375.1 U/ML (ref 0–38.6)
CHLORIDE SERPL-SCNC: 107 MMOL/L (ref 98–107)
CO2 SERPL-SCNC: 30 MMOL/L (ref 21–32)
CREAT SERPL-MCNC: 0.74 MG/DL (ref 0.5–1.05)
EOSINOPHIL # BLD AUTO: 0.35 X10*3/UL (ref 0–0.4)
EOSINOPHIL NFR BLD AUTO: 7.2 %
ERYTHROCYTE [DISTWIDTH] IN BLOOD BY AUTOMATED COUNT: 18.2 % (ref 11.5–14.5)
FERRITIN SERPL-MCNC: 382 NG/ML (ref 8–150)
GFR SERPL CREATININE-BSD FRML MDRD: 81 ML/MIN/1.73M*2
GLUCOSE SERPL-MCNC: 126 MG/DL (ref 74–99)
HCT VFR BLD AUTO: 28.8 % (ref 36–46)
HGB BLD-MCNC: 8.8 G/DL (ref 12–16)
IMM GRANULOCYTES # BLD AUTO: 0.03 X10*3/UL (ref 0–0.5)
IMM GRANULOCYTES NFR BLD AUTO: 0.6 % (ref 0–0.9)
IRON SATN MFR SERPL: 14 % (ref 25–45)
IRON SERPL-MCNC: 31 UG/DL (ref 35–150)
LYMPHOCYTES # BLD AUTO: 0.86 X10*3/UL (ref 0.8–3)
LYMPHOCYTES NFR BLD AUTO: 17.8 %
MCH RBC QN AUTO: 30.1 PG (ref 26–34)
MCHC RBC AUTO-ENTMCNC: 30.6 G/DL (ref 32–36)
MCV RBC AUTO: 99 FL (ref 80–100)
MONOCYTES # BLD AUTO: 0.56 X10*3/UL (ref 0.05–0.8)
MONOCYTES NFR BLD AUTO: 11.6 %
NEUTROPHILS # BLD AUTO: 2.94 X10*3/UL (ref 1.6–5.5)
NEUTROPHILS NFR BLD AUTO: 60.7 %
NRBC BLD-RTO: 0 /100 WBCS (ref 0–0)
PLATELET # BLD AUTO: 235 X10*3/UL (ref 150–450)
PMV BLD AUTO: 9.7 FL (ref 7.5–11.5)
POTASSIUM SERPL-SCNC: 3.9 MMOL/L (ref 3.5–5.3)
PROT SERPL-MCNC: 6 G/DL (ref 6.4–8.2)
RBC # BLD AUTO: 2.92 X10*6/UL (ref 4–5.2)
SODIUM SERPL-SCNC: 143 MMOL/L (ref 136–145)
TIBC SERPL-MCNC: 224 UG/DL (ref 240–445)
UIBC SERPL-MCNC: 193 UG/DL (ref 110–370)
WBC # BLD AUTO: 4.8 X10*3/UL (ref 4.4–11.3)

## 2023-10-04 PROCEDURE — 83550 IRON BINDING TEST: CPT | Performed by: INTERNAL MEDICINE

## 2023-10-04 PROCEDURE — 3078F DIAST BP <80 MM HG: CPT | Performed by: INTERNAL MEDICINE

## 2023-10-04 PROCEDURE — 1036F TOBACCO NON-USER: CPT | Performed by: INTERNAL MEDICINE

## 2023-10-04 PROCEDURE — 1125F AMNT PAIN NOTED PAIN PRSNT: CPT | Performed by: INTERNAL MEDICINE

## 2023-10-04 PROCEDURE — 82306 VITAMIN D 25 HYDROXY: CPT | Performed by: INTERNAL MEDICINE

## 2023-10-04 PROCEDURE — 99215 OFFICE O/P EST HI 40 MIN: CPT | Mod: 59 | Performed by: INTERNAL MEDICINE

## 2023-10-04 PROCEDURE — 82728 ASSAY OF FERRITIN: CPT | Performed by: INTERNAL MEDICINE

## 2023-10-04 PROCEDURE — 2500000004 HC RX 250 GENERAL PHARMACY W/ HCPCS (ALT 636 FOR OP/ED): Mod: JZ,JG | Performed by: INTERNAL MEDICINE

## 2023-10-04 PROCEDURE — 96402 CHEMO HORMON ANTINEOPL SQ/IM: CPT

## 2023-10-04 PROCEDURE — 36415 COLL VENOUS BLD VENIPUNCTURE: CPT | Performed by: INTERNAL MEDICINE

## 2023-10-04 PROCEDURE — 83540 ASSAY OF IRON: CPT | Performed by: INTERNAL MEDICINE

## 2023-10-04 PROCEDURE — 1160F RVW MEDS BY RX/DR IN RCRD: CPT | Performed by: INTERNAL MEDICINE

## 2023-10-04 PROCEDURE — 99417 PROLNG OP E/M EACH 15 MIN: CPT | Performed by: INTERNAL MEDICINE

## 2023-10-04 PROCEDURE — 99215 OFFICE O/P EST HI 40 MIN: CPT | Performed by: INTERNAL MEDICINE

## 2023-10-04 PROCEDURE — 85025 COMPLETE CBC W/AUTO DIFF WBC: CPT | Performed by: INTERNAL MEDICINE

## 2023-10-04 PROCEDURE — 86300 IMMUNOASSAY TUMOR CA 15-3: CPT | Performed by: INTERNAL MEDICINE

## 2023-10-04 PROCEDURE — 80053 COMPREHEN METABOLIC PANEL: CPT | Performed by: INTERNAL MEDICINE

## 2023-10-04 PROCEDURE — 1159F MED LIST DOCD IN RCRD: CPT | Performed by: INTERNAL MEDICINE

## 2023-10-04 PROCEDURE — 3075F SYST BP GE 130 - 139MM HG: CPT | Performed by: INTERNAL MEDICINE

## 2023-10-04 RX ORDER — DIPHENHYDRAMINE HCL 25 MG
25 TABLET ORAL EVERY 8 HOURS PRN
COMMUNITY

## 2023-10-04 RX ORDER — ACETAMINOPHEN 325 MG/1
325 TABLET ORAL EVERY 6 HOURS PRN
COMMUNITY

## 2023-10-04 RX ORDER — ALBUTEROL SULFATE 0.83 MG/ML
3 SOLUTION RESPIRATORY (INHALATION) AS NEEDED
Status: DISCONTINUED | OUTPATIENT
Start: 2023-10-04 | End: 2023-10-05 | Stop reason: HOSPADM

## 2023-10-04 RX ORDER — PROCHLORPERAZINE EDISYLATE 5 MG/ML
10 INJECTION INTRAMUSCULAR; INTRAVENOUS EVERY 6 HOURS PRN
Status: CANCELLED | OUTPATIENT
Start: 2023-11-29

## 2023-10-04 RX ORDER — FAMOTIDINE 10 MG/ML
20 INJECTION INTRAVENOUS ONCE AS NEEDED
Status: CANCELLED | OUTPATIENT
Start: 2023-11-29

## 2023-10-04 RX ORDER — LAMOTRIGINE 25 MG/1
500 TABLET ORAL ONCE
Status: COMPLETED | OUTPATIENT
Start: 2023-10-04 | End: 2023-10-04

## 2023-10-04 RX ORDER — LAMOTRIGINE 25 MG/1
500 TABLET ORAL ONCE
Status: CANCELLED | OUTPATIENT
Start: 2023-11-29

## 2023-10-04 RX ORDER — EXEMESTANE 25 MG/1
25 TABLET ORAL DAILY
Qty: 30 TABLET | Refills: 2 | Status: SHIPPED | OUTPATIENT
Start: 2023-10-04 | End: 2023-11-30

## 2023-10-04 RX ORDER — FAMOTIDINE 10 MG/ML
20 INJECTION INTRAVENOUS ONCE AS NEEDED
Status: DISCONTINUED | OUTPATIENT
Start: 2023-10-04 | End: 2023-10-05 | Stop reason: HOSPADM

## 2023-10-04 RX ORDER — EPINEPHRINE 0.3 MG/.3ML
0.3 INJECTION SUBCUTANEOUS EVERY 5 MIN PRN
Status: CANCELLED | OUTPATIENT
Start: 2023-11-29

## 2023-10-04 RX ORDER — ASCORBIC ACID 500 MG
500 TABLET ORAL DAILY
COMMUNITY

## 2023-10-04 RX ORDER — EPINEPHRINE 0.3 MG/.3ML
0.3 INJECTION SUBCUTANEOUS EVERY 5 MIN PRN
Status: DISCONTINUED | OUTPATIENT
Start: 2023-10-04 | End: 2023-10-05 | Stop reason: HOSPADM

## 2023-10-04 RX ORDER — PROCHLORPERAZINE MALEATE 10 MG
10 TABLET ORAL EVERY 6 HOURS PRN
Status: DISCONTINUED | OUTPATIENT
Start: 2023-10-04 | End: 2023-10-05 | Stop reason: HOSPADM

## 2023-10-04 RX ORDER — PROCHLORPERAZINE EDISYLATE 5 MG/ML
10 INJECTION INTRAMUSCULAR; INTRAVENOUS EVERY 6 HOURS PRN
Status: DISCONTINUED | OUTPATIENT
Start: 2023-10-04 | End: 2023-10-05 | Stop reason: HOSPADM

## 2023-10-04 RX ORDER — DIPHENHYDRAMINE HYDROCHLORIDE 50 MG/ML
50 INJECTION INTRAMUSCULAR; INTRAVENOUS AS NEEDED
Status: DISCONTINUED | OUTPATIENT
Start: 2023-10-04 | End: 2023-10-05 | Stop reason: HOSPADM

## 2023-10-04 RX ORDER — ALBUTEROL SULFATE 0.83 MG/ML
3 SOLUTION RESPIRATORY (INHALATION) AS NEEDED
Status: CANCELLED | OUTPATIENT
Start: 2023-11-29

## 2023-10-04 RX ORDER — TIZANIDINE HYDROCHLORIDE 2 MG/1
2 CAPSULE, GELATIN COATED ORAL EVERY 6 HOURS PRN
COMMUNITY

## 2023-10-04 RX ORDER — PROCHLORPERAZINE MALEATE 10 MG
10 TABLET ORAL EVERY 6 HOURS PRN
Status: CANCELLED | OUTPATIENT
Start: 2023-11-29

## 2023-10-04 RX ORDER — DIPHENHYDRAMINE HYDROCHLORIDE 50 MG/ML
50 INJECTION INTRAMUSCULAR; INTRAVENOUS AS NEEDED
Status: CANCELLED | OUTPATIENT
Start: 2023-11-29

## 2023-10-04 RX ADMIN — FULVESTRANT 500 MG: 50 INJECTION, SOLUTION INTRAMUSCULAR at 16:00

## 2023-10-04 ASSESSMENT — ENCOUNTER SYMPTOMS
LOSS OF SENSATION IN FEET: 0
DEPRESSION: 0
OCCASIONAL FEELINGS OF UNSTEADINESS: 0

## 2023-10-04 ASSESSMENT — COLUMBIA-SUICIDE SEVERITY RATING SCALE - C-SSRS
6. HAVE YOU EVER DONE ANYTHING, STARTED TO DO ANYTHING, OR PREPARED TO DO ANYTHING TO END YOUR LIFE?: NO
2. HAVE YOU ACTUALLY HAD ANY THOUGHTS OF KILLING YOURSELF?: NO
1. IN THE PAST MONTH, HAVE YOU WISHED YOU WERE DEAD OR WISHED YOU COULD GO TO SLEEP AND NOT WAKE UP?: NO

## 2023-10-04 ASSESSMENT — PAIN SCALES - GENERAL: PAINLEVEL: 3

## 2023-10-04 NOTE — PROGRESS NOTES
"Pt seen in office today for a chemo follow up visit with Dr. Prajapati for management of her breast cancer. She recently had surgery on her left foot and she is in a wheelchair. She reports pain as a \"3/10\" for which  is aware. She is to have her Faslodex today following her FUV.    Medications and allergies have been reviewed with patient and updated in her medical record.    Per orders, she is to not restart the Ibrance or Xgeva due to  her foot continuing to heal. She is to have labs drawn from her PICC line today including a Tempus liquid biopsy. She is also to have her scans rescheduled and will follow up in 8 weeks with Dr. Prajapati. Faslodex will continue to be every 28 days per order.     Our contact information was given to patient and they were encouraged to contact us with any questions or concerns.     Patient verbalized understanding and agreement regarding discussed information via verbal feedback. Pt escorted to scheduling.   "

## 2023-10-10 RX ORDER — DIPHENHYDRAMINE HYDROCHLORIDE 50 MG/ML
50 INJECTION INTRAMUSCULAR; INTRAVENOUS AS NEEDED
Status: CANCELLED | OUTPATIENT
Start: 2023-12-27

## 2023-10-10 RX ORDER — PROCHLORPERAZINE MALEATE 10 MG
10 TABLET ORAL EVERY 6 HOURS PRN
Status: CANCELLED | OUTPATIENT
Start: 2023-12-27

## 2023-10-10 RX ORDER — LAMOTRIGINE 25 MG/1
500 TABLET ORAL ONCE
Status: CANCELLED | OUTPATIENT
Start: 2023-12-27

## 2023-10-10 RX ORDER — PROCHLORPERAZINE EDISYLATE 5 MG/ML
10 INJECTION INTRAMUSCULAR; INTRAVENOUS EVERY 6 HOURS PRN
Status: CANCELLED | OUTPATIENT
Start: 2023-12-27

## 2023-10-10 RX ORDER — EPINEPHRINE 0.3 MG/.3ML
0.3 INJECTION SUBCUTANEOUS EVERY 5 MIN PRN
Status: CANCELLED | OUTPATIENT
Start: 2023-12-27

## 2023-10-10 RX ORDER — FAMOTIDINE 10 MG/ML
20 INJECTION INTRAVENOUS ONCE AS NEEDED
Status: CANCELLED | OUTPATIENT
Start: 2023-12-27

## 2023-10-10 RX ORDER — ALBUTEROL SULFATE 0.83 MG/ML
3 SOLUTION RESPIRATORY (INHALATION) AS NEEDED
Status: CANCELLED | OUTPATIENT
Start: 2023-12-27

## 2023-10-10 NOTE — PROGRESS NOTES
Patient ID: Samira López is a 82 y.o. female.  MRN: [id number]  : 1940  The patient presents to clinic today for her history of metastatic breast cancer.    Diagnostic/Therapeutic History:  BREAST CANCER DIAGNOSIS  Metastatic/recurrent ER+/HER2- breast cancer (metastases to bone, liver)     Current Treatment:  1. Fulvestrant  2. Palbociclib (on hold for infection)  3. Denosumab     1. Self palpated a left breast mass in early 2017 .  2. 6/15/17- Mammo and breast U/s- mammo showed a suspicious spiculated index mass in inferior/medial left breast.  Suspicious focal asymmetry  anterior to mass and an associated skin retraction. No suspicious masses/calcs identified in right breast. U/s of left breast showed at 5:00, 1 cm FN, irregular spiculated 0.9 X 1 X 1 cm hypoechoic mass. Another mass adjacent to index mass, 1.1 X 0.9  X 0.8 cm (overall measure 3.3 X 1.6 cm ).  Left axillary U/s showed prominent LN 1.2 cm with thickened cortex.  3. 17- U/s guided core biopsy of left breast 5:00, 1 cm FN- ILC, grade 2, ER strongly positive >95%, IN strongly positive 95%, HER2-neg.  Left axillary biopsy- metastatic carcinoma.  4. 7/15/17: Started neoadjuvant letrozole; good clinical  response and tolerated well.  5. Left partial mastectomy and SLN 18 showed residual lobular carcinoma and 1/1 SLN. The largest focus of LN tumor measures 0.45 cm. Microscopic tumor deposits present in perinodal soft tissue. Residual mass in breast measured 1.3 cm.  6. Restaging studies 2018 showed diffuse bone metastases. Bone biopsy 18 showed malignant cells c/w lobular carcinoma. Ibrance initiated  18. Faslodex added 18. Letrozole discontinued 18. Ibrance held 2020 for PVD and ischemia left lower extremity. Treatment resumed  6/10/20. Dose-reduced to 100 mg (days 1-21 of a 28-day cycle) in 3/2021 for recurrent cytopenias.  -Ibrance intermittently on hold for foot infection.    History of Present  Illness (HPI)/Interval History:  Samira López presents today for routine FUV, labs and Faslodex.  Unfortunately, she developed osteomyelitis of her left foot that required transmetatarsal amputation.  She is finishing antibiotics and following closely with Podiatry.  Her Ibrance has been on hold, as directed.  She has intermittent pain, but this is not severe or persistent.  No fevers, chills.  She is in rehab, which she believes is helping mobility/strength.    Review of Systems:  14-point ROS otherwise negative, as per HPI/Interval History.    Past Medical History:   Diagnosis Date    Abnormal findings on diagnostic imaging of heart and coronary circulation 07/30/2019    Abnormal echocardiogram    Abrasion, unspecified foot, initial encounter 06/15/2020    Abrasion, foot    Body mass index (BMI) 31.0-31.9, adult 02/22/2021    BMI 31.0-31.9,adult    Body mass index (BMI) 37.0-37.9, adult     BMI 37.0-37.9, adult    Body mass index (BMI) 38.0-38.9, adult     BMI 38.0-38.9,adult    Body mass index (BMI)30.0-30.9, adult 07/25/2022    BMI 30.0-30.9,adult    Body mass index (BMI)30.0-30.9, adult 06/16/2021    BMI 30.0-30.9,adult    Body mass index (BMI)30.0-30.9, adult 02/28/2022    BMI 30.0-30.9,adult    Body mass index (BMI)30.0-30.9, adult 06/02/2022    BMI 30.0-30.9,adult    Body mass index (BMI)30.0-30.9, adult 04/04/2022    BMI 30.0-30.9,adult    Cellulitis of left lower limb 09/20/2022    Cellulitis of foot, left    Combined forms of age-related cataract, bilateral 07/27/2022    Mixed type age-related cataract, both eyes    Dyskinesia of esophagus 12/05/2019    Esophageal spasm    Elevated blood-pressure reading, without diagnosis of hypertension 11/03/2015    Elevated blood pressure reading without diagnosis of hypertension    Epistaxis 11/25/2019    Mild epistaxis    Hypoxemia 08/17/2020    Hypoxia    Idiopathic sleep related nonobstructive alveolar hypoventilation 08/17/2020    Oxygen desaturation during  sleep    Idiopathic sleep related nonobstructive alveolar hypoventilation 08/17/2020    Nocturnal hypoxia    Local infection of the skin and subcutaneous tissue, unspecified 04/13/2020    Foot infection    Nipple discharge 06/02/2017    Nipple discharge in female    Non-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 foot with unspecified severity (CMS/[facility]) 07/12/2022    Foot ulcer, left    Non-pressure chronic ulcer of unspecified part of left lower leg with unspecified severity (CMS/[facility]) 06/16/2021    Leg ulcer, left    Other conditions influencing health status 05/19/2015    History of cough    Other disorders of electrolyte and fluid balance, not elsewhere classified 06/26/2019    Electrolyte and fluid disorder    Other pulmonary embolism with acute cor pulmonale (CMS/[facility]) 06/16/2021    Pulmonary embolism with acute cor pulmonale, unspecified chronicity, unspecified pulmonary embolism type    Other specified personal risk factors, not elsewhere classified 07/31/2019    At risk for complication    Other specified postprocedural states 06/29/2017    History of right breast biopsy    Other symptoms and signs involving the nervous system 08/17/2020    Suspected sleep apnea    Pain in unspecified foot 04/29/2020    Ischemic foot pain at rest    Pain in unspecified hand 07/30/2013    Pain in hand    Pain in unspecified hand 06/22/2020    Pain in hand    Pain in unspecified hip 06/12/2013    Joint pain, hip    Pain in unspecified hip 06/22/2020    Joint pain, hip    Pain, unspecified 02/28/2022    Pain    Personal history of diseases of the skin and subcutaneous tissue 02/12/2020    History of dermatitis    Personal history of other diseases of the circulatory system 06/16/2021    History of pulmonary hypertension    Personal history of other diseases of the musculoskeletal system and connective tissue 12/17/2020    History of low back pain    Personal history of other diseases of the musculoskeletal system and connective  tissue 12/04/2020    History of fibromyalgia    Personal history of other diseases of the nervous system and sense organs 08/17/2020    History of sciatica    Personal history of other diseases of the respiratory system 05/19/2015    History of laryngitis    Personal history of other diseases of the respiratory system 12/23/2017    History of sinusitis    Personal history of other specified conditions 06/29/2017    History of abnormal mammogram    Personal history of other specified conditions 07/05/2018    History of urinary incontinence    Personal history of other specified conditions 06/29/2017    History of breast lump    Personal history of other specified conditions 07/31/2019    History of shortness of breath    Personal history of other specified conditions 06/22/2020    History of edema    Personal history of pulmonary embolism 04/06/2022    History of pulmonary embolism    Preglaucoma, unspecified, unspecified eye     Glaucoma suspect    Radiculopathy, lumbar region 02/23/2022    Lumbar radicular pain    Shortness of breath 08/08/2023    Sleep apnea, unspecified 02/12/2020    Sleep disorder breathing    Spondylosis without myelopathy or radiculopathy, lumbar region 02/23/2022    Lumbar spondylosis    Unspecified cataract 06/17/2020    Cataract of left eye    Unspecified cataract     Cataract of right eye    Unspecified cataract     Cataract of left eye     Patient Active Problem List   Diagnosis    Arthritis, multiple joint involvement    Anemia, macrocytic    Balance disorder    Benign essential hypertension    Breast cancer metastasized to bone (CMS/HCC)    Carcinoma of left breast metastatic to axillary lymph node (CMS/HCC)    Cervical spinal stenosis    Lumbar canal stenosis    Chronic diastolic congestive heart failure (CMS/HCC)    Chronic venous insufficiency    Critical lower limb ischemia (CMS/HCC)    Edema leg    Esophageal reflux    Dermatitis    Hyperlipidemia    Ischemic ulcer of foot due to  atherosclerosis of lower extremity (CMS/HCC)    Mild stage glaucoma    Obstructive sleep apnea, adult    Peripheral polyneuropathy    Peripheral vascular disease (CMS/HCC)    Xerosis of skin    Status post left breast lumpectomy    Sciatica of left side    Pseudophakia, both eyes    Anemia due to chronic kidney disease    BMI 30.0-30.9,adult    Cellulitis and abscess of lower extremity    Cellulitis of left lower limb    Chronic diastolic (congestive) heart failure (CMS/HCC)    Stage 3 chronic kidney disease (CMS/HCC)    Cutaneous abscess of left lower limb    History of falling    Hypertensive heart and chronic kidney disease with heart failure and stage 1 through stage 4 chronic kidney disease, or chronic kidney disease (CMS/HCC)    Hyperlipidemia, unspecified    Lobular carcinoma in situ of left breast    Long term (current) use of anticoagulants    Malignant neoplasm of unspecified site of unspecified female breast (CMS/HCC)    Muscle weakness    Nutritional anemia    Obesity    NATALIYA (obstructive sleep apnea)    Open wound of knee, leg (except thigh), and ankle    Other pulmonary embolism without acute cor pulmonale (CMS/HCC)    Other specified postprocedural states    Palliative care status    Personal history of nicotine dependence    Primary generalized (osteo)arthritis    SCC (squamous cell carcinoma)    Sciatica, left side    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)    Secondary malignant neoplasm of bone (CMS/HCC)    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)    Type 2 DM with diabetic peripheral angiopathy w/o gangrene (CMS/HCC)    Glaucoma    Abnormal computed tomography scan    Accidental fall    Anxiety    Asthenia    Blister of ankle    Blister of leg    Gangrene (CMS/HCC)    Candidal intertrigo    Cellulitis    Chronic obstructive lung disease (CMS/HCC)    Chronic respiratory failure with hypoxia (CMS/HCC)    Cold feeling     Diabetes mellitus type 2 in obese (CMS/HCC)    Type 2 diabetes mellitus with ulcer (CMS/HCC)    Foot pain    Hip pain    Pain in toe    Gangrene of foot (CMS/HCC)    History of malignant neoplasm of breast    History of pulmonary embolism    Hyperkalemia    Hypoxia    Limb ischemia    Pain of left lower leg    Primary open angle glaucoma (POAG) of both eyes, mild stage    Puncture wound without foreign body, left foot, initial encounter    Puncture wound without foreign body, right lower leg, subsequent encounter    Toxic metabolic encephalopathy    Venous stasis    Sepsis (CMS/HCC)    Edema of lower extremity    Glaucoma suspect, both eyes    Hypotension    Low blood pressure    Peripheral arterial disease (CMS/HCC)    Dyspnea    Non-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 foot with fat layer exposed (CMS/HCC)    Asteatosis cutis    Pancytopenia (CMS/HCC)    BMI 29.0-29.9,adult    Class 1 obesity due to excess calories with body mass index (BMI) of 31.0 to 31.9 in adult    Primary open angle glaucoma (POAG) of both eyes, moderate stage    Ulcer of left foot (CMS/HCC)        Past Surgical History:   Procedure Laterality Date    CHOLECYSTECTOMY  06/12/2013    Cholecystectomy Laparoscopic    CT AORTA AND BILATERAL ILIOFEMORAL RUNOFF ANGIOGRAM W AND/OR WO IV CONTRAST  4/25/2020    CT AORTA AND BILATERAL ILIOFEMORAL RUNOFF ANGIOGRAM W AND/OR WO IV CONTRAST 4/25/2020 [facility] CLINICAL LEGACY    CT AORTA AND BILATERAL ILIOFEMORAL RUNOFF ANGIOGRAM W AND/OR WO IV CONTRAST  9/8/2022    CT AORTA AND BILATERAL ILIOFEMORAL RUNOFF ANGIOGRAM W AND/OR WO IV CONTRAST [facility]PATIENT LEGAC[facility]    CT AORTA AND BILATERAL ILIOFEMORAL RUNOFF ANGIOGRAM W AND/OR WO IV CONTRAST  5/17/2023    CT AORTA AND BILATERAL ILIOFEMORAL RUNOFF ANGIOGRAM W AND/OR WO IV CONTRAST [facility] INPATIENT LEGACY    CT GUIDED PERCUTANEOUS BIOPSY BONE DEEP  12/13/2018    CT GUIDED PERCUTANEOUS BIOPSY BONE DEEP 12/13/2018 [facility] LEGACY    FOOT SURGERY  06/29/2017     Foot Surgery    OTHER SURGICAL HISTORY  2020    Cataract surgery       Social History     Socioeconomic History    Marital status:      Spouse name: None    Number of children: None    Years of education: None    Highest education level: None   Occupational History    None   Tobacco Use    Smoking status: Former     Packs/day: 1.50     Years: 15.00     Additional pack years: 0.00     Total pack years: 22.50     Types: Cigarettes     Quit date: 2000     Years since quittin.7    Smokeless tobacco: Never   Vaping Use    Vaping Use: Never used   Substance and Sexual Activity    Alcohol use: Yes     Comment: jossie    Drug use: Not Currently    Sexual activity: None   Other Topics Concern    None   Social History Narrative    None     Social Determinants of Health     Financial Resource Strain: Not on file   Food Insecurity: Not on file   Transportation Needs: Not on file   Physical Activity: Not on file   Stress: Not on file   Social Connections: Not on file   Intimate Partner Violence: Not on file   Housing Stability: Not on file       Allergies:   Allergies   Allergen Reactions    Cephalosporins Hives    Ciprofloxacin Hives    Codeine Unknown    Epoetin Josue Unknown         Current Outpatient Medications:     acetaminophen (Tylenol) 325 mg tablet, Take 1 tablet (325 mg) by mouth every 6 hours if needed for mild pain (1 - 3)., Disp: , Rfl:     ascorbic acid (Vitamin C) 500 mg tablet, Take 1 tablet (500 mg) by mouth once daily., Disp: , Rfl:     cholecalciferol (Vitamin D-3) 10 mcg (400 unit) tablet,chewable, Chew 2 tablets (20 mcg) once daily., Disp: 180 tablet, Rfl: 3    diphenhydrAMINE (Sominex) 25 mg tablet, Take 1 tablet (25 mg) by mouth every 6 hours if needed for itching., Disp: , Rfl:     dorzolamide (Trusopt) 2 % ophthalmic solution, Administer 1 drop into both eyes 2 times a day., Disp: , Rfl:     Eliquis 5 mg tablet, Take 0.5 tablets (2.5 mg) by mouth 2 times a day., Disp: , Rfl:      esomeprazole (NexIUM 24HR) 20 mg DR capsule, Take 1 capsule (20 mg) by mouth once daily., Disp: , Rfl:     HySept 0.25 % external solution, APPLY AND CLEAN WOUND BED, Disp: , Rfl:     latanoprost (Xalatan) 0.005 % ophthalmic solution, INSTILL  1 DROP INTO BOTH EYES EVERY DAY AT BEDTIME, Disp: , Rfl:     metoprolol succinate XL (Toprol-XL) 50 mg 24 hr tablet, Take 1 tablet (50 mg) by mouth once daily., Disp: , Rfl:     oxybutynin XL (Ditropan-XL) 10 mg 24 hr tablet, Take 1 tablet (10 mg) by mouth once daily., Disp: , Rfl:     oxygen (O2) gas therapy, Administer 4 L into affected nostril(s) once daily at bedtime., Disp: , Rfl:     rosuvastatin (Crestor) 20 mg tablet, Take 1 tablet (20 mg) by mouth once daily., Disp: 90 tablet, Rfl: 0    tiZANidine (Zanaflex) 2 mg capsule, Take 1 capsule (2 mg) by mouth every 6 hours if needed (mild pain)., Disp: , Rfl:     aflibercept (Eylea) 2 mg/0.05 mL intra-ocular injection, Inject into the eye. Take as direted, Disp: , Rfl:     amLODIPine (Norvasc) 5 mg tablet, Take 1 tablet (5 mg) by mouth once daily., Disp: , Rfl:     amoxicillin (Amoxil) 500 mg capsule, Take 1 capsule (500 mg) by mouth 2 times a day., Disp: , Rfl:     aspirin (Aspirin Childrens) 81 mg chewable tablet, Chew 1 tablet (81 mg) once daily., Disp: , Rfl:     biotin 1 mg tablet, Take by mouth once daily., Disp: , Rfl:     brimonidine (AlphaGAN P) 0.2 % ophthalmic solution, every 12 hours., Disp: , Rfl:     calcium citrate 250 mg calcium tablet, Take 2 tablets (500 mg) by mouth once daily., Disp: , Rfl:     cholecalciferol (Vitamin D3) 50 mcg (2,000 unit) capsule, Take 1 capsule (50 mcg) by mouth early in the morning.., Disp: , Rfl:     ciprofloxacin (Cipro) 500 mg tablet, Take 1 tablet (500 mg) by mouth every 12 hours., Disp: , Rfl:     clobetasol (Temovate) 0.05 % ointment, Apply 1 Application topically once daily., Disp: , Rfl:     clopidogrel (Plavix) 75 mg tablet, Take 1 tablet (75 mg) by mouth once daily., Disp:  , Rfl:     exemestane (Aromasin) 25 mg tablet, Take 1 tablet (25 mg total) by mouth once daily.  Take after a meal.  Try to take at the same time each day., Disp: 30 tablet, Rfl: 2    ferrous gluconate (Ferate) 240 (27 Fe) MG tablet, Take 1 tablet (27 mg) by mouth 2 times a day., Disp: , Rfl:     furosemide (Lasix) 20 mg tablet, Take 2 tablets (40 mg) by mouth once daily., Disp: , Rfl:     Ibrance 100 mg tablet, 1 tablet (100 mg)., Disp: , Rfl:     ketorolac (Acular) 0.5 % ophthalmic solution, instill 1 drop into the right eye four times every day as needed, Disp: , Rfl:     lisinopril 20 mg tablet, Take 1 tablet (20 mg) by mouth once daily., Disp: , Rfl:     loperamide HCl (IRINA ORAL), Administer into the right eye once daily., Disp: , Rfl:     omega 3-dha-epa-fish oil (Fish OiL) 1,200 (144-216) mg capsule, Take by mouth. as directed Orally, Disp: , Rfl:     oxybutynin XL (Ditropan-XL) 5 mg 24 hr tablet, Take 1 tablet (5 mg) by mouth once daily., Disp: , Rfl:     palbociclib (IBRANCE ORAL), Take 1 capsule by mouth once daily. 125mg oral capsule, for 21 days off 7, Disp: , Rfl:     palbociclib (Ibrance) 100 mg tablet, TAKE ONE (1) TABLET BY MOUTH ONCE A DAY ON DAYS 1-21 OF A 28-DAY CYCLE (Patient not taking: Reported on 10/4/2023), Disp: 21 tablet, Rfl: 2    pantoprazole (ProtoNix) 40 mg EC tablet, Take by mouth once daily., Disp: , Rfl:     trospium (Sanctura XR) 60 mg 24 hour capsule, Take 1 capsule (60 mg) by mouth once daily. on an empty stomach every morning Orally Once a day for 30 day(s), Disp: , Rfl:      Objective    BSA: There is no height or weight on file to calculate BSA.  /61 (BP Location: Right arm, Patient Position: Sitting, BP Cuff Size: Adult)   Pulse 71   Temp 36.2 °C (97.2 °F)   Resp 18   SpO2 99% Comment: 3 lt    ECOG Performance Status:  0 Fully active, able to carry on all pre-disease performance without restriction.  1 Restricted in physically strenuous activity but ambulatory and  able to carry out work of a light or sedentary nature, e.g., light house work, office work.  2 Ambulatory and capable of all selfcare but unable to carry out any work activities; up and about more than 50% of waking hours.  3 Capable of only limited selfcare; confined to bed or chair more than 50% of waking hours.  4 Completely disabled; cannot carry on any selfcare; totally confined to bed or chair.    Physical Exam  Constitutional:       General: She is not in acute distress.     Comments: Chronically ill-appearing, stable.   HENT:      Head: Atraumatic.      Mouth/Throat:      Mouth: Mucous membranes are moist.      Pharynx: Oropharynx is clear. No oropharyngeal exudate.   Eyes:      General: No scleral icterus.  Cardiovascular:      Rate and Rhythm: Normal rate and regular rhythm.      Heart sounds: No murmur heard.  Pulmonary:      Effort: Pulmonary effort is normal. No respiratory distress.      Comments: Decreased breath sounds throughout bilateral lungs, chronic.  Musculoskeletal:      Cervical back: No rigidity or tenderness.      Comments: Left foot in cast.   Lymphadenopathy:      Cervical: No cervical adenopathy.   Skin:     Coloration: Skin is not jaundiced.   Neurological:      General: No focal deficit present.      Mental Status: She is oriented to person, place, and time. Mental status is at baseline.   Psychiatric:         Mood and Affect: Mood normal.         Thought Content: Thought content normal.         Judgment: Judgment normal.         Laboratory Data:  Lab Results   Component Value Date    WBC 4.8 10/04/2023    HGB 8.8 (L) 10/04/2023    HCT 28.8 (L) 10/04/2023    MCV 99 10/04/2023     10/04/2023    ANC 2.59 08/26/2022       Chemistry    Lab Results   Component Value Date/Time     10/04/2023 1543    K 3.9 10/04/2023 1543     10/04/2023 1543    CO2 30 10/04/2023 1543    BUN 6 10/04/2023 1543    CREATININE 0.74 10/04/2023 1543    Lab Results   Component Value Date/Time     CALCIUM 9.1 10/04/2023 1543    ALKPHOS 61 10/04/2023 1543    AST 14 10/04/2023 1543    ALT 8 10/04/2023 1543    BILITOT 0.3 10/04/2023 1543         Latest Reference Range & Units 10/04/23 15:43   CA 27.29 0.0 - 38.6 U/mL 375.1 (H)   (H): Data is abnormally high       Radiology:  No recent radiology to review.    Pathology:  No recent pathology to review.         Assessment/Plan:  1. Metastatic breast cancer. Recurrent grade 2 lobular carcinoma with bone metastases. She has been on fulvestrant and palbociclib since 12/2018.  - Palbociclib on hold due to recurrent/persistent infection (see below).  - Continue fulvestrant 500 mg IM q4 weeks.  - Add exemestane 25 mg daily. Script provided.  - CA27-29 has been rising.  - CT CAP and bone scan to be done before next visit.  - [facility] liquid biopsy obtained today for NGS testing.  - The patient as given ample opportunity to ask questions and is in agreement with the plan.     2. Macrocytic anemia. Grade 2. Likely multifactorial from CDK4/6 inhibitor therapy, iron-deficiency, intermittent hemorrhoidal bleeding, recent infection, antibiotics. S/p  IV iron infusions in the past.     3. Low back pain. Overall stable.  - She can continue 5 mg PO oxycodone as needed. She will call me when she needs a refill.     4. Bone metastases.   - Hold denosumab until her next visit to allow for recovery from orthopedic surgery.     5. Pulmonary emboli. Diagnosed 2/2019. On Eliquis 5 mg BID.     6. Left foot wound, osteomyelitis. Follows with wound care, vacular surgery, and podiatry.  - S/p left transmetatarsal amputation 9/2023.  - Holding palbociclib. Undergoing PT/rehab.  - Completing antibiotics. No infectious symptoms at this time.      Disposition.  - RTC 8 weeks for FUV (11/29/23).  - Scans, labs, and Faselodex injections have been ordered.  - She was given our contact information and instructed to call with concerns/questions in the interim.    Orders Placed This Encounter    Procedures    CT chest abdomen pelvis w IV contrast    NM bone whole body 3 phase    CBC and Auto Differential    Comprehensive Metabolic Panel    Ferritin    Iron and TIBC    Cancer Antigen 27-29    Vitamin D 25-Hydroxy,Total (for eval of Vitamin D levels)    [facility] liquid biopsy; Other-indicate in comment ([facility] (send out)); [facility] - Miscellaneous Test        Emmanuel Prajapati MD  Hematology and Medical Oncology  [facility]

## 2023-10-11 ENCOUNTER — OFFICE VISIT (OUTPATIENT)
Dept: OPHTHALMOLOGY | Facility: CLINIC | Age: 83
End: 2023-10-11
Payer: MEDICARE

## 2023-10-11 DIAGNOSIS — H40.9 GLAUCOMA OF BOTH EYES, UNSPECIFIED GLAUCOMA TYPE: ICD-10-CM

## 2023-10-11 DIAGNOSIS — H40.1131 PRIMARY OPEN ANGLE GLAUCOMA (POAG) OF BOTH EYES, MILD STAGE: Primary | ICD-10-CM

## 2023-10-11 PROCEDURE — 99214 OFFICE O/P EST MOD 30 MIN: CPT | Performed by: OPHTHALMOLOGY

## 2023-10-11 PROCEDURE — 92133 CPTRZD OPH DX IMG PST SGM ON: CPT | Performed by: OPHTHALMOLOGY

## 2023-10-11 RX ORDER — LATANOPROST 50 UG/ML
SOLUTION/ DROPS OPHTHALMIC
Qty: 7.5 ML | Refills: 3 | Status: SHIPPED | OUTPATIENT
Start: 2023-10-11 | End: 2023-11-30

## 2023-10-11 RX ORDER — DORZOLAMIDE HCL 20 MG/ML
1 SOLUTION/ DROPS OPHTHALMIC 2 TIMES DAILY
Qty: 9 ML | Refills: 3 | Status: SHIPPED | OUTPATIENT
Start: 2023-10-11 | End: 2023-11-30

## 2023-10-11 ASSESSMENT — ENCOUNTER SYMPTOMS
HEMATOLOGIC/LYMPHATIC NEGATIVE: 0
NEUROLOGICAL NEGATIVE: 0
ENDOCRINE NEGATIVE: 0
EYES NEGATIVE: 0
GASTROINTESTINAL NEGATIVE: 0
RESPIRATORY NEGATIVE: 0
MUSCULOSKELETAL NEGATIVE: 0
ALLERGIC/IMMUNOLOGIC NEGATIVE: 0
CONSTITUTIONAL NEGATIVE: 0
PSYCHIATRIC NEGATIVE: 0
CARDIOVASCULAR NEGATIVE: 0

## 2023-10-11 ASSESSMENT — VISUAL ACUITY
METHOD: SNELLEN - LINEAR
OS_CC+: +2
CORRECTION_TYPE: GLASSES
OS_PH_CC: 20/30
OD_CC: 20/70
OS_CC: 20/50

## 2023-10-11 ASSESSMENT — TONOMETRY
OS_IOP_MMHG: 19
OD_IOP_MMHG: 19
IOP_METHOD: GOLDMANN APPLANATION

## 2023-10-11 ASSESSMENT — CUP TO DISC RATIO
OS_RATIO: 0.75
OD_RATIO: 0.65

## 2023-10-11 ASSESSMENT — EXTERNAL EXAM - LEFT EYE: OS_EXAM: NORMAL

## 2023-10-11 ASSESSMENT — SLIT LAMP EXAM - LIDS
COMMENTS: NORMAL
COMMENTS: NORMAL

## 2023-10-11 ASSESSMENT — EXTERNAL EXAM - RIGHT EYE: OD_EXAM: NORMAL

## 2023-10-11 NOTE — PROGRESS NOTES
primary open angle glaucoma, both eyes, mild stage:    tmax 26/28, no family hx, s/p phaco/istent OU     OCT, Optic Nerve - OU - Both Eyes          Right Eye  Images reviewed and comparison made to baseline. Reliability: borderline.     Left Eye  Images reviewed and comparison made to baseline. Reliability: poor.     Notes  Thinning OD from baseline, but stable from previous year,, OS is unreliable to determine           fields with nonspecific defects OD>OS, updated 10/20/21 with worsening superior defects OU.    IOP above goal at this visit but did not take brimonidine this morning. goal <15 OU,    patient is complaining of brimonidine intolerance, will stop   instead use dorzolamide BID OU continue latan qhs OU  S.p slt od  IOP too high candelaria but patient not takign drops correcly  Importance of compliance stressed  Rtc 3-4 months for Bradley visual field (HVF) 24-2 and IOP check      exudative macular degeneration, both eyes: follows with Dr. turner, ON Q12W INJECTIONS

## 2023-10-15 ENCOUNTER — APPOINTMENT (OUTPATIENT)
Dept: RADIOLOGY | Facility: HOSPITAL | Age: 83
DRG: 177 | End: 2023-10-15
Payer: MEDICARE

## 2023-10-15 ENCOUNTER — HOSPITAL ENCOUNTER (INPATIENT)
Facility: HOSPITAL | Age: 83
LOS: 10 days | Discharge: NURSING, REHAB OR LONG-TERM CARE | DRG: 177 | End: 2023-10-25
Attending: EMERGENCY MEDICINE | Admitting: INTERNAL MEDICINE
Payer: MEDICARE

## 2023-10-15 DIAGNOSIS — I73.9 PERIPHERAL ARTERIAL DISEASE (CMS-HCC): ICD-10-CM

## 2023-10-15 DIAGNOSIS — J96.01 ACUTE RESPIRATORY FAILURE WITH HYPOXIA (MULTI): ICD-10-CM

## 2023-10-15 DIAGNOSIS — L97.528: ICD-10-CM

## 2023-10-15 DIAGNOSIS — Z22.322 MRSA (METHICILLIN RESISTANT STAPH AUREUS) CULTURE POSITIVE: ICD-10-CM

## 2023-10-15 DIAGNOSIS — E11.628 TYPE 2 DIABETES MELLITUS WITH OTHER SKIN COMPLICATION, UNSPECIFIED WHETHER LONG TERM INSULIN USE (MULTI): ICD-10-CM

## 2023-10-15 DIAGNOSIS — R53.1 GENERALIZED WEAKNESS: ICD-10-CM

## 2023-10-15 DIAGNOSIS — R78.81 BACTEREMIA: ICD-10-CM

## 2023-10-15 DIAGNOSIS — L97.522 ULCER OF LEFT FOOT WITH FAT LAYER EXPOSED (MULTI): ICD-10-CM

## 2023-10-15 DIAGNOSIS — H40.1132 PRIMARY OPEN ANGLE GLAUCOMA (POAG) OF BOTH EYES, MODERATE STAGE: ICD-10-CM

## 2023-10-15 DIAGNOSIS — I48.91 ATRIAL FIBRILLATION, UNSPECIFIED TYPE (MULTI): ICD-10-CM

## 2023-10-15 DIAGNOSIS — U07.1 COVID-19: ICD-10-CM

## 2023-10-15 DIAGNOSIS — J96.00 ACUTE RESPIRATORY FAILURE (MULTI): Primary | ICD-10-CM

## 2023-10-15 DIAGNOSIS — L03.032 CELLULITIS AND ABSCESS OF TOE OF LEFT FOOT: ICD-10-CM

## 2023-10-15 DIAGNOSIS — J96.21 ACUTE ON CHRONIC RESPIRATORY FAILURE WITH HYPOXIA (MULTI): ICD-10-CM

## 2023-10-15 DIAGNOSIS — L02.612 CELLULITIS AND ABSCESS OF TOE OF LEFT FOOT: ICD-10-CM

## 2023-10-15 DIAGNOSIS — L97.529 ULCER OF LEFT FOOT, UNSPECIFIED ULCER STAGE (MULTI): ICD-10-CM

## 2023-10-15 LAB
ALBUMIN SERPL-MCNC: 3.1 G/DL (ref 3.5–5)
ALP BLD-CCNC: 86 U/L (ref 35–125)
ALT SERPL-CCNC: 12 U/L (ref 5–40)
ANION GAP SERPL CALC-SCNC: 10 MMOL/L
AST SERPL-CCNC: 22 U/L (ref 5–40)
BILIRUB SERPL-MCNC: 1.8 MG/DL (ref 0.1–1.2)
BUN SERPL-MCNC: 14 MG/DL (ref 8–25)
CALCIUM SERPL-MCNC: 10.1 MG/DL (ref 8.5–10.4)
CHLORIDE SERPL-SCNC: 106 MMOL/L (ref 97–107)
CO2 SERPL-SCNC: 24 MMOL/L (ref 24–31)
CREAT SERPL-MCNC: 0.8 MG/DL (ref 0.4–1.6)
ERYTHROCYTE [DISTWIDTH] IN BLOOD BY AUTOMATED COUNT: 18.6 % (ref 11.5–14.5)
GFR SERPL CREATININE-BSD FRML MDRD: 74 ML/MIN/1.73M*2
GLUCOSE SERPL-MCNC: 128 MG/DL (ref 65–99)
HCT VFR BLD AUTO: 28 % (ref 36–46)
HGB BLD-MCNC: 9.1 G/DL (ref 12–16)
LACTATE BLDV-SCNC: 1.9 MMOL/L (ref 0.4–2)
MCH RBC QN AUTO: 32.9 PG (ref 26–34)
MCHC RBC AUTO-ENTMCNC: 32.5 G/DL (ref 32–36)
MCV RBC AUTO: 101 FL (ref 80–100)
NRBC BLD-RTO: 0 /100 WBCS (ref 0–0)
NT-PROBNP SERPL-MCNC: 1094 PG/ML (ref 0–624)
PLATELET # BLD AUTO: 224 X10*3/UL (ref 150–450)
PMV BLD AUTO: 10.8 FL (ref 7.5–11.5)
POTASSIUM SERPL-SCNC: 4.5 MMOL/L (ref 3.4–5.1)
PROT SERPL-MCNC: 6.7 G/DL (ref 5.9–7.9)
RBC # BLD AUTO: 2.77 X10*6/UL (ref 4–5.2)
SARS-COV-2 RNA RESP QL NAA+PROBE: DETECTED
SODIUM SERPL-SCNC: 140 MMOL/L (ref 133–145)
WBC # BLD AUTO: 7.2 X10*3/UL (ref 4.4–11.3)

## 2023-10-15 PROCEDURE — 2500000005 HC RX 250 GENERAL PHARMACY W/O HCPCS: Performed by: INTERNAL MEDICINE

## 2023-10-15 PROCEDURE — 2500000004 HC RX 250 GENERAL PHARMACY W/ HCPCS (ALT 636 FOR OP/ED): Performed by: INTERNAL MEDICINE

## 2023-10-15 PROCEDURE — 80053 COMPREHEN METABOLIC PANEL: CPT | Performed by: NURSE PRACTITIONER

## 2023-10-15 PROCEDURE — 87070 CULTURE OTHR SPECIMN AEROBIC: CPT | Mod: CMCLAB,WESLAB | Performed by: NURSE PRACTITIONER

## 2023-10-15 PROCEDURE — 87635 SARS-COV-2 COVID-19 AMP PRB: CPT | Performed by: NURSE PRACTITIONER

## 2023-10-15 PROCEDURE — 96365 THER/PROPH/DIAG IV INF INIT: CPT

## 2023-10-15 PROCEDURE — 1200000002 HC GENERAL ROOM WITH TELEMETRY DAILY

## 2023-10-15 PROCEDURE — 2500000004 HC RX 250 GENERAL PHARMACY W/ HCPCS (ALT 636 FOR OP/ED): Performed by: EMERGENCY MEDICINE

## 2023-10-15 PROCEDURE — 87075 CULTR BACTERIA EXCEPT BLOOD: CPT | Mod: CMCLAB,WESLAB | Performed by: NURSE PRACTITIONER

## 2023-10-15 PROCEDURE — 71045 X-RAY EXAM CHEST 1 VIEW: CPT

## 2023-10-15 PROCEDURE — 36415 COLL VENOUS BLD VENIPUNCTURE: CPT | Performed by: NURSE PRACTITIONER

## 2023-10-15 PROCEDURE — 2500000001 HC RX 250 WO HCPCS SELF ADMINISTERED DRUGS (ALT 637 FOR MEDICARE OP): Performed by: INTERNAL MEDICINE

## 2023-10-15 PROCEDURE — 5A0935A ASSISTANCE WITH RESPIRATORY VENTILATION, LESS THAN 24 CONSECUTIVE HOURS, HIGH NASAL FLOW/VELOCITY: ICD-10-PCS | Performed by: INTERNAL MEDICINE

## 2023-10-15 PROCEDURE — 87186 SC STD MICRODIL/AGAR DIL: CPT | Mod: CMCLAB,WESLAB | Performed by: NURSE PRACTITIONER

## 2023-10-15 PROCEDURE — XW033E5 INTRODUCTION OF REMDESIVIR ANTI-INFECTIVE INTO PERIPHERAL VEIN, PERCUTANEOUS APPROACH, NEW TECHNOLOGY GROUP 5: ICD-10-PCS | Performed by: INTERNAL MEDICINE

## 2023-10-15 PROCEDURE — 85027 COMPLETE CBC AUTOMATED: CPT | Performed by: NURSE PRACTITIONER

## 2023-10-15 PROCEDURE — 73630 X-RAY EXAM OF FOOT: CPT | Mod: LT,FY

## 2023-10-15 PROCEDURE — 87040 BLOOD CULTURE FOR BACTERIA: CPT | Mod: CMCLAB,WESLAB | Performed by: NURSE PRACTITIONER

## 2023-10-15 PROCEDURE — 83880 ASSAY OF NATRIURETIC PEPTIDE: CPT | Performed by: NURSE PRACTITIONER

## 2023-10-15 PROCEDURE — 3E0DX3Z INTRODUCTION OF ANTI-INFLAMMATORY INTO MOUTH AND PHARYNX, EXTERNAL APPROACH: ICD-10-PCS | Performed by: INTERNAL MEDICINE

## 2023-10-15 PROCEDURE — 2500000004 HC RX 250 GENERAL PHARMACY W/ HCPCS (ALT 636 FOR OP/ED): Performed by: NURSE PRACTITIONER

## 2023-10-15 PROCEDURE — 99285 EMERGENCY DEPT VISIT HI MDM: CPT | Mod: 25 | Performed by: EMERGENCY MEDICINE

## 2023-10-15 PROCEDURE — 83605 ASSAY OF LACTIC ACID: CPT | Performed by: NURSE PRACTITIONER

## 2023-10-15 PROCEDURE — 96374 THER/PROPH/DIAG INJ IV PUSH: CPT | Mod: 59

## 2023-10-15 RX ORDER — GUAIFENESIN/DEXTROMETHORPHAN 100-10MG/5
5 SYRUP ORAL EVERY 4 HOURS PRN
Status: DISCONTINUED | OUTPATIENT
Start: 2023-10-15 | End: 2023-10-25 | Stop reason: HOSPADM

## 2023-10-15 RX ORDER — ACETAMINOPHEN 650 MG/1
650 SUPPOSITORY RECTAL EVERY 4 HOURS PRN
Status: DISCONTINUED | OUTPATIENT
Start: 2023-10-15 | End: 2023-10-25 | Stop reason: HOSPADM

## 2023-10-15 RX ORDER — POLYETHYLENE GLYCOL 3350 17 G/17G
17 POWDER, FOR SOLUTION ORAL DAILY PRN
Status: DISCONTINUED | OUTPATIENT
Start: 2023-10-15 | End: 2023-10-25 | Stop reason: HOSPADM

## 2023-10-15 RX ORDER — GUAIFENESIN 600 MG/1
600 TABLET, EXTENDED RELEASE ORAL EVERY 12 HOURS PRN
Status: DISCONTINUED | OUTPATIENT
Start: 2023-10-15 | End: 2023-10-25 | Stop reason: HOSPADM

## 2023-10-15 RX ORDER — ACETAMINOPHEN 325 MG/1
650 TABLET ORAL EVERY 4 HOURS PRN
Status: DISCONTINUED | OUTPATIENT
Start: 2023-10-15 | End: 2023-10-25 | Stop reason: HOSPADM

## 2023-10-15 RX ORDER — DEXAMETHASONE SODIUM PHOSPHATE 4 MG/ML
10 INJECTION, SOLUTION INTRA-ARTICULAR; INTRALESIONAL; INTRAMUSCULAR; INTRAVENOUS; SOFT TISSUE ONCE
Status: COMPLETED | OUTPATIENT
Start: 2023-10-15 | End: 2023-10-15

## 2023-10-15 RX ORDER — VANCOMYCIN 1.75 G/350ML
1250 INJECTION, SOLUTION INTRAVENOUS ONCE
Status: COMPLETED | OUTPATIENT
Start: 2023-10-15 | End: 2023-10-15

## 2023-10-15 RX ORDER — DEXAMETHASONE 6 MG/1
6 TABLET ORAL
Status: DISCONTINUED | OUTPATIENT
Start: 2023-10-16 | End: 2023-10-25 | Stop reason: HOSPADM

## 2023-10-15 RX ORDER — VANCOMYCIN 750 MG/150ML
750 INJECTION, SOLUTION INTRAVENOUS EVERY 12 HOURS
Status: DISCONTINUED | OUTPATIENT
Start: 2023-10-16 | End: 2023-10-17 | Stop reason: DRUGHIGH

## 2023-10-15 RX ORDER — FUROSEMIDE 10 MG/ML
40 INJECTION INTRAMUSCULAR; INTRAVENOUS ONCE
Status: COMPLETED | OUTPATIENT
Start: 2023-10-15 | End: 2023-10-15

## 2023-10-15 RX ORDER — ACETAMINOPHEN 325 MG/1
650 TABLET ORAL ONCE
Status: COMPLETED | OUTPATIENT
Start: 2023-10-15 | End: 2023-10-15

## 2023-10-15 RX ORDER — ACETAMINOPHEN 160 MG/5ML
650 SOLUTION ORAL EVERY 4 HOURS PRN
Status: DISCONTINUED | OUTPATIENT
Start: 2023-10-15 | End: 2023-10-25 | Stop reason: HOSPADM

## 2023-10-15 RX ORDER — IPRATROPIUM BROMIDE AND ALBUTEROL SULFATE 2.5; .5 MG/3ML; MG/3ML
3 SOLUTION RESPIRATORY (INHALATION)
Status: DISCONTINUED | OUTPATIENT
Start: 2023-10-15 | End: 2023-10-16

## 2023-10-15 RX ADMIN — ACETAMINOPHEN 650 MG: 160 SOLUTION ORAL at 23:33

## 2023-10-15 RX ADMIN — DEXAMETHASONE SODIUM PHOSPHATE 10 MG: 4 INJECTION, SOLUTION INTRAMUSCULAR; INTRAVENOUS at 19:06

## 2023-10-15 RX ADMIN — Medication 200 MG: at 23:39

## 2023-10-15 RX ADMIN — ACETAMINOPHEN 650 MG: 325 TABLET ORAL at 17:57

## 2023-10-15 RX ADMIN — VANCOMYCIN 1250 MG: 1.75 INJECTION, SOLUTION INTRAVENOUS at 17:57

## 2023-10-15 RX ADMIN — FUROSEMIDE 40 MG: 10 INJECTION, SOLUTION INTRAMUSCULAR; INTRAVENOUS at 19:07

## 2023-10-15 RX ADMIN — PIPERACILLIN SODIUM AND TAZOBACTAM SODIUM 3.38 G: 3; .375 INJECTION, SOLUTION INTRAVENOUS at 16:53

## 2023-10-15 ASSESSMENT — LIFESTYLE VARIABLES
HAVE YOU EVER FELT YOU SHOULD CUT DOWN ON YOUR DRINKING: NO
HAVE PEOPLE ANNOYED YOU BY CRITICIZING YOUR DRINKING: NO
REASON UNABLE TO ASSESS: NO
EVER HAD A DRINK FIRST THING IN THE MORNING TO STEADY YOUR NERVES TO GET RID OF A HANGOVER: NO
EVER FELT BAD OR GUILTY ABOUT YOUR DRINKING: NO

## 2023-10-15 ASSESSMENT — PAIN SCALES - GENERAL
PAINLEVEL_OUTOF10: 6
PAINLEVEL_OUTOF10: 9
PAINLEVEL_OUTOF10: 8

## 2023-10-15 ASSESSMENT — PAIN - FUNCTIONAL ASSESSMENT: PAIN_FUNCTIONAL_ASSESSMENT: 0-10

## 2023-10-15 ASSESSMENT — PAIN SCALES - WONG BAKER: WONGBAKER_NUMERICALRESPONSE: HURTS EVEN MORE

## 2023-10-15 NOTE — ED PROVIDER NOTES
HPI   Chief Complaint   Patient presents with    Weakness, Gen     Pt c/o increased weakness. Pt was released yesterday from rehab       HPI  Please see my MDM                  [place] Coma Scale Score: 15                  Patient History   Past Medical History:   Diagnosis Date    Abnormal findings on diagnostic imaging of heart and coronary circulation 07/30/2019    Abnormal echocardiogram    Abrasion, unspecified foot, initial encounter 06/15/2020    Abrasion, foot    Body mass index (BMI) 31.0-31.9, adult 02/22/2021    BMI 31.0-31.9,adult    Body mass index (BMI) 37.0-37.9, adult     BMI 37.0-37.9, adult    Body mass index (BMI) 38.0-38.9, adult     BMI 38.0-38.9,adult    Body mass index (BMI)30.0-30.9, adult 07/25/2022    BMI 30.0-30.9,adult    Body mass index (BMI)30.0-30.9, adult 06/16/2021    BMI 30.0-30.9,adult    Body mass index (BMI)30.0-30.9, adult 02/28/2022    BMI 30.0-30.9,adult    Body mass index (BMI)30.0-30.9, adult 06/02/2022    BMI 30.0-30.9,adult    Body mass index (BMI)30.0-30.9, adult 04/04/2022    BMI 30.0-30.9,adult    Cellulitis of left lower limb 09/20/2022    Cellulitis of foot, left    Combined forms of age-related cataract, bilateral 07/27/2022    Mixed type age-related cataract, both eyes    Dyskinesia of esophagus 12/05/2019    Esophageal spasm    Elevated blood-pressure reading, without diagnosis of hypertension 11/03/2015    Elevated blood pressure reading without diagnosis of hypertension    Epistaxis 11/25/2019    Mild epistaxis    Glaucoma     Hypoxemia 08/17/2020    Hypoxia    Idiopathic sleep related nonobstructive alveolar hypoventilation 08/17/2020    Oxygen desaturation during sleep    Idiopathic sleep related nonobstructive alveolar hypoventilation 08/17/2020    Nocturnal hypoxia    Local infection of the skin and subcutaneous tissue, unspecified 04/13/2020    Foot infection    Nipple discharge 06/02/2017    Nipple discharge in female    Non-pressure chronic ulcer of other  part of left foot with unspecified severity (CMS/[facility]) 07/12/2022    Foot ulcer, left    Non-pressure chronic ulcer of unspecified part of left lower leg with unspecified severity (CMS/[facility]) 06/16/2021    Leg ulcer, left    Other conditions influencing health status 05/19/2015    History of cough    Other disorders of electrolyte and fluid balance, not elsewhere classified 06/26/2019    Electrolyte and fluid disorder    Other pulmonary embolism with acute cor pulmonale (CMS/[facility]) 06/16/2021    Pulmonary embolism with acute cor pulmonale, unspecified chronicity, unspecified pulmonary embolism type    Other specified personal risk factors, not elsewhere classified 07/31/2019    At risk for complication    Other specified postprocedural states 06/29/2017    History of right breast biopsy    Other symptoms and signs involving the nervous system 08/17/2020    Suspected sleep apnea    Pain in unspecified foot 04/29/2020    Ischemic foot pain at rest    Pain in unspecified hand 07/30/2013    Pain in hand    Pain in unspecified hand 06/22/2020    Pain in hand    Pain in unspecified hip 06/12/2013    Joint pain, hip    Pain in unspecified hip 06/22/2020    Joint pain, hip    Pain, unspecified 02/28/2022    Pain    Personal history of diseases of the skin and subcutaneous tissue 02/12/2020    History of dermatitis    Personal history of other diseases of the circulatory system 06/16/2021    History of pulmonary hypertension    Personal history of other diseases of the musculoskeletal system and connective tissue 12/17/2020    History of low back pain    Personal history of other diseases of the musculoskeletal system and connective tissue 12/04/2020    History of fibromyalgia    Personal history of other diseases of the nervous system and sense organs 08/17/2020    History of sciatica    Personal history of other diseases of the respiratory system 05/19/2015    History of laryngitis    Personal history of other diseases of the  respiratory system 12/23/2017    History of sinusitis    Personal history of other specified conditions 06/29/2017    History of abnormal mammogram    Personal history of other specified conditions 07/05/2018    History of urinary incontinence    Personal history of other specified conditions 06/29/2017    History of breast lump    Personal history of other specified conditions 07/31/2019    History of shortness of breath    Personal history of other specified conditions 06/22/2020    History of edema    Personal history of pulmonary embolism 04/06/2022    History of pulmonary embolism    Preglaucoma, unspecified, unspecified eye     Glaucoma suspect    Radiculopathy, lumbar region 02/23/2022    Lumbar radicular pain    Shortness of breath 08/08/2023    Sleep apnea, unspecified 02/12/2020    Sleep disorder breathing    Spondylosis without myelopathy or radiculopathy, lumbar region 02/23/2022    Lumbar spondylosis    Unspecified cataract 06/17/2020    Cataract of left eye    Unspecified cataract     Cataract of right eye    Unspecified cataract     Cataract of left eye     Past Surgical History:   Procedure Laterality Date    CHOLECYSTECTOMY  06/12/2013    Cholecystectomy Laparoscopic    CT AORTA AND BILATERAL ILIOFEMORAL RUNOFF ANGIOGRAM W AND/OR WO IV CONTRAST  4/25/2020    CT AORTA AND BILATERAL ILIOFEMORAL RUNOFF ANGIOGRAM W AND/OR WO IV CONTRAST 4/25/2020 [facility] CLINICAL LEGACY    CT AORTA AND BILATERAL ILIOFEMORAL RUNOFF ANGIOGRAM W AND/OR WO IV CONTRAST  9/8/2022    CT AORTA AND BILATERAL ILIOFEMORAL RUNOFF ANGIOGRAM W AND/OR WO IV CONTRAST [facility] INPATIENT LEGACY    CT AORTA AND BILATERAL ILIOFEMORAL RUNOFF ANGIOGRAM W AND/OR WO IV CONTRAST  5/17/2023    CT AORTA AND BILATERAL ILIOFEMORAL RUNOFF ANGIOGRAM W AND/OR WO IV CONTRAST [facility] INPATIENT LEGACY    CT GUIDED PERCUTANEOUS BIOPSY BONE DEEP  12/13/2018    CT GUIDED PERCUTANEOUS BIOPSY BONE DEEP 12/13/2018 [facility] AIB LEGACY    FOOT SURGERY  06/29/2017    Foot Surgery     OTHER SURGICAL HISTORY  2020    Cataract surgery     Family History   Problem Relation Name Age of Onset    Breast cancer Mother      Coronary artery disease Father       Social History     Tobacco Use    Smoking status: Former     Packs/day: 1.50     Years: 15.00     Additional pack years: 0.00     Total pack years: 22.50     Types: Cigarettes     Quit date: 2000     Years since quittin.8    Smokeless tobacco: Never   Vaping Use    Vaping Use: Never used   Substance Use Topics    Alcohol use: Yes     Comment: jossie    Drug use: Not Currently       Physical Exam   ED Triage Vitals [10/15/23 1635]   Temp Heart Rate Resp BP   36.7 °C (98.1 °F) 91 16 143/57      SpO2 Temp Source Heart Rate Source Patient Position   91 % Oral -- --      BP Location FiO2 (%)     Right arm --       Physical Exam  CONSTITUTIONAL: Vital signs reviewed as charted, well-developed and in no distress  Eyes: Extraocular muscles are intact. Pupils equal round and reactive to light. Conjunctiva are pink.    ENT: Mucous membranes are moist. Tongue in the midline. Pharynx was without erythema or exudates, uvula midline  LUNGS: Breath sounds equal and clear to auscultation. Good air exchange, no wheezes rales or retractions, pulse oximetry is charted.  HEART: Regular rate and rhythm without murmur thrill or rub, strong tones, auscultation is normal.  ABDOMEN: Soft and nontender without guarding rebound rigidity or mass. Bowel sounds are present and normal in all quadrants. There is no palpable masses or aneurysms identified. No hepatosplenomegaly, normal abdominal exam.  Neuro: The patient is awake, alert and oriented ×3. Moving all 4 extremities and answering questions appropriately.   MUSCULOSKELETAL: The calves are nontender to palpation. Full gross active range of motion.  Examination of the left lower extremity-spine show a midfoot amputation sutures in place, foul-smelling greenish discharge present.  Cap refill less than 2  seconds.  There is erythema also noted on the right lower extremity.  PSYCH: Awake alert oriented, normal mood and affect.  Skin:  Dry, normal color, warm to the touch, no rash present.      ED Course & MDM   ED Course as of 10/15/23 1851   Sun Oct 15, 2023   1850 Received call from Dr. Patterson, he states Dr. Parmer just admitted the patient 3 weeks ago recommended calling him.  I did speak with Dr. Parmer who is agreed to accept the patient. [RJ]      ED Course User Index  [RJ] Per Coyne, APRN-CNP         Diagnoses as of 10/15/23 1851   Acute respiratory failure (CMS/HCC)   Cellulitis and abscess of toe of left foot   COVID-19   Acute respiratory failure with hypoxia (CMS/HCC)   Generalized weakness       Medical Decision Making  History obtained from: patient    Vital signs, nursing notes, current medications, past medical history, Surgical history, allergies, social history, family History were reviewed.         HPI:  Patient is a 82-year-old female sent to the emergency room today states she was released from rehab yesterday after having a left midfoot amputation a couple weeks ago by Dr. Loera.  She states when she got home she becomes generally weak is fallen twice does not feel that she can take care of herself at home.  Noticed when I entered the room there was a foul-smelling smell significant for wound infection in my previous experience.  Patient dressings were taken down she has a foul-smelling green discharge from the wound.  No fevers chills or night sweats.  No nausea vomiting diarrhea.  She denies chest pain or shortness of breath.  She does generally wear oxygen at home on a regular basis.      10 point ROS was reviewed and negative except Noted above in HPI.  DDX: as listed above    Chest x-ray interpreted by the radiologist shows:IMPRESSION:  Allowing for the aforementioned limitation, bilateral perihilar  interstitial infiltrates, most likely due to mild/early pulmonary  edema; correlate  clinically and follow-up as needed.  X-ray of the left foot interpreted by the radiologist shows:IMPRESSION:  Diffuse soft tissue swelling status post recent 1st through 5th  distal metatarsal amputation. No acute osteomyelitis.  CT angio of the chest interpreted by the radiologist shows:    Medications administered during this visit (name and route):IV furosemide  EKG interpretted by my attending physician    SAM Summary/considerations:  I spoke with Dr. Ríos. We thoroughly discussed the history, physical exam, laboratory and imaging studies, as well as, emergency department course. Based upon that discussion, we've decided to admit for further observation and evaluation of their cellulitis.  As I have deemed necessary from their history, physical, and studies, I have considered and evaluated for the following diagnoses: DEEP SPACE INFECTIONS, DEEP VENOUS THROMBOSIS, SOTO'S GANGRENE, S EPSIS, and TOXIC SHOCK SYNDROME.     With foul-smelling discharge from her postsurgical left foot wound.  Also tested positive for COVID-19.  Normally wears 3 L at home currently doing well on 6.  Have given dexamethasone for the COVID-19 and respiratory failure.  Have started on IV antibiotics after blood cultures were drawn.  Wound culture was also sent on the foot.  Patient will be admitted for further evaluation and care.        I saw this patient in conjunction with Dr. Coello, please see her supervision note.    After reviewing patient's comorbidities, severity of history of presenting illness, labs and imaging if obtained in conjunction with physical exam and course in emergency department, deemed to have potential for deterioration/progression of symptoms that could lead to multiple morbidities or mortality, decision made that patient requires further observation/evaluation/treatment and patient admitted to appropriate service, patient/family understand and agree with plan.      Critical Care: CRITICAL CARE NOTE  Due  to the patient's symptoms on presentation. Need for frequent reassessment. Potential for deterioration. All results/imaging obtained reviewed and interpreted, results trended/compared with previous levels if available to evaluate for abnormality contributing to today's presentation of acute respiratory failure with hypoxia.  Critical care time total at least 35 minutes of non concurrent critical care time provided by myself. This did not include any separate billable procedures.    Prescriptions provided include: none    This chart was completed using voice recognition transcription software. Please excuse any errors of transcription including grammatical, punctuation, syntax and spelling errors.  Please contact me with any questions regarding this chart.    The patient was seen in conjunction with the advanced practice provider, and I performed a substantive portion of the visit.  All medical decision making was performed by me.  If applicable, all laboratory studies, radiology, and EKGs were reviewed by me.     History: 82-year-old female status post left midfoot surgery approximately 2 weeks ago presents for evaluation of drainage from her left foot incision site.  She also has history of COPD wears 3 L home oxygen at all times now requiring 5 L nasal cannula although denies feeling short of breath or having any chest pain.  She is having some foul-smelling drainage from her left foot and has fallen a couple of times having difficulty with self-care at home.  Physical exam:  Constitutional:  Awake, alert, well appearing, nontoxic  HEENT:  Normocephalic, atraumatic  Neck: Trachea midline, no stridor  Respiratory/Chest: No respiratory distress, speaking full sentences, somewhat diminished at bilateral bases otherwise clear to auscultation bilaterally, no wheezes, rhonchi, or rales  CV:  Regular rate and regular rhythm, no murmurs, gallops, or rubs  Extremities/MSK: Left midfoot amputation with sutures in place but  foul-smelling greenish discolored drainage from her wound  Neuro: A/O, normal speech  Skin:  Warm and dry  MDM: 82-year-old female presents for drainage from left foot amputation site.  She does also have increased oxygen requirement although does not frankly feel short of breath.  Septic work-up ensued and patient was started on empiric antibiotics.  She has improved on 5-6 L nasal cannula.  She did test positive for COVID-19 which may be contributory.  She is fully anticoagulated on Eliquis therefore lower suspicion for pulmonary embolism.  EKG nondiagnostic.  Labs with no significant leukocytosis, BNP mildly elevated and again COVID-19 positive.  X-ray left foot with no evidence of osteomyelitis although there is soft tissue swelling.  Chest x-ray on my independent review with possible early CHF versus finding secondary to her COVID-19 infection.  Will admit for IV antibiotics and further management as well as for increased hypoxic respiratory failure.    CRITICAL CARE NOTE:    Upon my evaluation, this patient had a high probability of imminent or life-threatening deterioration due to acute on chronic hypoxic respiratory failure, possible sepsis, which required my direct attention, intervention, and personal management    36 total minutes of critical care were personally provided which excludes and was non concurrent with other providers and all other billable procedures.  This was for time at the bedside, re-evaluations, interpretation of lab and imaging results, discussions with consultants, and monitoring for potential decompensation.  Intervention were performed as documented above.    Brie Coello MD    Procedure  Procedures     MALIK Otero-CNP  10/15/23 1846       MALIK Otero-PAL  10/15/23 1851       MALIK Otero-PAL  10/15/23 2006       Brie Coello MD  10/15/23 [id number]

## 2023-10-15 NOTE — ED NOTES
Provider and patient notified of Covid-19 positive result. Patient placed in Isolation. All needs addressed.     Chris Jean Baptiste LPN  10/15/23 5816

## 2023-10-15 NOTE — Clinical Note
Is the patient on isolation?: Yes Comment: covid  Do you expect the patient to require telemetry (informational-only for bed management): Yes  Do you expect the patient to require observation or inpt? (informational-only for bed management): Alice singh

## 2023-10-16 ENCOUNTER — APPOINTMENT (OUTPATIENT)
Dept: VASCULAR SURGERY | Facility: CLINIC | Age: 83
End: 2023-10-16
Payer: MEDICARE

## 2023-10-16 LAB
ANION GAP SERPL CALC-SCNC: 13 MMOL/L
APPEARANCE UR: CLEAR
BILIRUB UR STRIP.AUTO-MCNC: NEGATIVE MG/DL
BUN SERPL-MCNC: 17 MG/DL (ref 8–25)
CALCIUM SERPL-MCNC: 9.7 MG/DL (ref 8.5–10.4)
CHLORIDE SERPL-SCNC: 106 MMOL/L (ref 97–107)
CO2 SERPL-SCNC: 24 MMOL/L (ref 24–31)
COLOR UR: ABNORMAL
CREAT SERPL-MCNC: 0.9 MG/DL (ref 0.4–1.6)
CRP SERPL-MCNC: 5.9 MG/DL (ref 0–2)
ERYTHROCYTE [DISTWIDTH] IN BLOOD BY AUTOMATED COUNT: 18.4 % (ref 11.5–14.5)
ERYTHROCYTE [SEDIMENTATION RATE] IN BLOOD BY WESTERGREN METHOD: 36 MM/H (ref 0–30)
FERRITIN SERPL-MCNC: 425 NG/ML (ref 13–150)
FOLATE SERPL-MCNC: 13.6 NG/ML (ref 4.2–19.9)
GFR SERPL CREATININE-BSD FRML MDRD: 64 ML/MIN/1.73M*2
GLUCOSE BLD MANUAL STRIP-MCNC: 153 MG/DL (ref 74–99)
GLUCOSE BLD MANUAL STRIP-MCNC: 180 MG/DL (ref 74–99)
GLUCOSE BLD MANUAL STRIP-MCNC: 184 MG/DL (ref 74–99)
GLUCOSE SERPL-MCNC: 173 MG/DL (ref 65–99)
GLUCOSE UR STRIP.AUTO-MCNC: NORMAL MG/DL
HCT VFR BLD AUTO: 26.6 % (ref 36–46)
HGB BLD-MCNC: 9.4 G/DL (ref 12–16)
HYALINE CASTS #/AREA URNS AUTO: ABNORMAL /LPF
IRON SATN MFR SERPL: ABNORMAL %
IRON SERPL-MCNC: <20 UG/DL (ref 30–160)
KETONES UR STRIP.AUTO-MCNC: NEGATIVE MG/DL
LEUKOCYTE ESTERASE UR QL STRIP.AUTO: ABNORMAL
MCH RBC QN AUTO: 36.3 PG (ref 26–34)
MCHC RBC AUTO-ENTMCNC: 35.3 G/DL (ref 32–36)
MCV RBC AUTO: 103 FL (ref 80–100)
MUCOUS THREADS #/AREA URNS AUTO: ABNORMAL /LPF
NITRITE UR QL STRIP.AUTO: NEGATIVE
NRBC BLD-RTO: 0 /100 WBCS (ref 0–0)
PH UR STRIP.AUTO: 7 [PH]
PLATELET # BLD AUTO: 200 X10*3/UL (ref 150–450)
PMV BLD AUTO: 10.5 FL (ref 7.5–11.5)
POTASSIUM SERPL-SCNC: 4.2 MMOL/L (ref 3.4–5.1)
PROT UR STRIP.AUTO-MCNC: NEGATIVE MG/DL
RBC # BLD AUTO: 2.59 X10*6/UL (ref 4–5.2)
RBC # UR STRIP.AUTO: NEGATIVE /UL
RBC #/AREA URNS AUTO: ABNORMAL /HPF
SODIUM SERPL-SCNC: 143 MMOL/L (ref 133–145)
SP GR UR STRIP.AUTO: 1.01
SQUAMOUS #/AREA URNS AUTO: ABNORMAL /HPF
TIBC SERPL-MCNC: ABNORMAL UG/DL
UIBC SERPL-MCNC: 184 UG/DL (ref 110–370)
UROBILINOGEN UR STRIP.AUTO-MCNC: NORMAL MG/DL
VIT B12 SERPL-MCNC: 677 PG/ML (ref 211–946)
WBC # BLD AUTO: 12.3 X10*3/UL (ref 4.4–11.3)
WBC #/AREA URNS AUTO: ABNORMAL /HPF

## 2023-10-16 PROCEDURE — 2500000002 HC RX 250 W HCPCS SELF ADMINISTERED DRUGS (ALT 637 FOR MEDICARE OP, ALT 636 FOR OP/ED): Performed by: INTERNAL MEDICINE

## 2023-10-16 PROCEDURE — C1751 CATH, INF, PER/CENT/MIDLINE: HCPCS

## 2023-10-16 PROCEDURE — 97161 PT EVAL LOW COMPLEX 20 MIN: CPT | Mod: GP | Performed by: PHYSICAL THERAPIST

## 2023-10-16 PROCEDURE — 85027 COMPLETE CBC AUTOMATED: CPT | Performed by: NURSE PRACTITIONER

## 2023-10-16 PROCEDURE — 1200000002 HC GENERAL ROOM WITH TELEMETRY DAILY

## 2023-10-16 PROCEDURE — 81001 URINALYSIS AUTO W/SCOPE: CPT | Performed by: INTERNAL MEDICINE

## 2023-10-16 PROCEDURE — 9420000001 HC RT PATIENT EDUCATION 5 MIN

## 2023-10-16 PROCEDURE — 82607 VITAMIN B-12: CPT | Performed by: NURSE PRACTITIONER

## 2023-10-16 PROCEDURE — 97166 OT EVAL MOD COMPLEX 45 MIN: CPT | Mod: GO

## 2023-10-16 PROCEDURE — 87081 CULTURE SCREEN ONLY: CPT | Mod: CMCLAB,WESLAB | Performed by: INTERNAL MEDICINE

## 2023-10-16 PROCEDURE — 2780000003 HC OR 278 NO HCPCS

## 2023-10-16 PROCEDURE — 36569 INSJ PICC 5 YR+ W/O IMAGING: CPT

## 2023-10-16 PROCEDURE — 82746 ASSAY OF FOLIC ACID SERUM: CPT | Performed by: NURSE PRACTITIONER

## 2023-10-16 PROCEDURE — 87075 CULTR BACTERIA EXCEPT BLOOD: CPT | Mod: WESLAB | Performed by: STUDENT IN AN ORGANIZED HEALTH CARE EDUCATION/TRAINING PROGRAM

## 2023-10-16 PROCEDURE — 85652 RBC SED RATE AUTOMATED: CPT | Performed by: NURSE PRACTITIONER

## 2023-10-16 PROCEDURE — 2500000004 HC RX 250 GENERAL PHARMACY W/ HCPCS (ALT 636 FOR OP/ED): Performed by: INTERNAL MEDICINE

## 2023-10-16 PROCEDURE — 2500000005 HC RX 250 GENERAL PHARMACY W/O HCPCS: Performed by: INTERNAL MEDICINE

## 2023-10-16 PROCEDURE — 80048 BASIC METABOLIC PNL TOTAL CA: CPT | Performed by: NURSE PRACTITIONER

## 2023-10-16 PROCEDURE — 36415 COLL VENOUS BLD VENIPUNCTURE: CPT | Performed by: NURSE PRACTITIONER

## 2023-10-16 PROCEDURE — 2500000001 HC RX 250 WO HCPCS SELF ADMINISTERED DRUGS (ALT 637 FOR MEDICARE OP): Performed by: INTERNAL MEDICINE

## 2023-10-16 PROCEDURE — 2500000004 HC RX 250 GENERAL PHARMACY W/ HCPCS (ALT 636 FOR OP/ED): Performed by: NURSE PRACTITIONER

## 2023-10-16 PROCEDURE — 83540 ASSAY OF IRON: CPT | Performed by: NURSE PRACTITIONER

## 2023-10-16 PROCEDURE — 97530 THERAPEUTIC ACTIVITIES: CPT | Mod: GO

## 2023-10-16 PROCEDURE — 87186 SC STD MICRODIL/AGAR DIL: CPT | Mod: CMCLAB,WESLAB | Performed by: STUDENT IN AN ORGANIZED HEALTH CARE EDUCATION/TRAINING PROGRAM

## 2023-10-16 PROCEDURE — 94640 AIRWAY INHALATION TREATMENT: CPT

## 2023-10-16 PROCEDURE — 86140 C-REACTIVE PROTEIN: CPT | Performed by: NURSE PRACTITIONER

## 2023-10-16 PROCEDURE — 82728 ASSAY OF FERRITIN: CPT | Performed by: NURSE PRACTITIONER

## 2023-10-16 PROCEDURE — 97535 SELF CARE MNGMENT TRAINING: CPT | Mod: GO

## 2023-10-16 PROCEDURE — 96372 THER/PROPH/DIAG INJ SC/IM: CPT | Performed by: INTERNAL MEDICINE

## 2023-10-16 PROCEDURE — 97530 THERAPEUTIC ACTIVITIES: CPT | Mod: GP | Performed by: PHYSICAL THERAPIST

## 2023-10-16 PROCEDURE — 82947 ASSAY GLUCOSE BLOOD QUANT: CPT

## 2023-10-16 RX ORDER — ACETAMINOPHEN 500 MG
50 TABLET ORAL DAILY
Status: DISCONTINUED | OUTPATIENT
Start: 2023-10-16 | End: 2023-10-18

## 2023-10-16 RX ORDER — CLOPIDOGREL BISULFATE 75 MG/1
75 TABLET ORAL DAILY
Status: DISCONTINUED | OUTPATIENT
Start: 2023-10-16 | End: 2023-10-17

## 2023-10-16 RX ORDER — ACETAMINOPHEN 325 MG/1
325 TABLET ORAL EVERY 6 HOURS PRN
Status: DISCONTINUED | OUTPATIENT
Start: 2023-10-16 | End: 2023-10-25 | Stop reason: HOSPADM

## 2023-10-16 RX ORDER — LIDOCAINE HYDROCHLORIDE 10 MG/ML
1 INJECTION INFILTRATION; PERINEURAL ONCE
Status: DISCONTINUED | OUTPATIENT
Start: 2023-10-16 | End: 2023-10-25 | Stop reason: HOSPADM

## 2023-10-16 RX ORDER — ASCORBIC ACID 500 MG
500 TABLET ORAL
Status: DISCONTINUED | OUTPATIENT
Start: 2023-10-16 | End: 2023-10-25 | Stop reason: HOSPADM

## 2023-10-16 RX ORDER — LATANOPROST 50 UG/ML
1 SOLUTION/ DROPS OPHTHALMIC NIGHTLY
Status: DISCONTINUED | OUTPATIENT
Start: 2023-10-16 | End: 2023-10-25 | Stop reason: HOSPADM

## 2023-10-16 RX ORDER — AMLODIPINE BESYLATE 5 MG/1
5 TABLET ORAL DAILY
Status: DISCONTINUED | OUTPATIENT
Start: 2023-10-16 | End: 2023-10-17

## 2023-10-16 RX ORDER — IPRATROPIUM BROMIDE AND ALBUTEROL SULFATE 2.5; .5 MG/3ML; MG/3ML
3 SOLUTION RESPIRATORY (INHALATION)
Status: DISCONTINUED | OUTPATIENT
Start: 2023-10-16 | End: 2023-10-25 | Stop reason: HOSPADM

## 2023-10-16 RX ORDER — PANTOPRAZOLE SODIUM 40 MG/1
40 TABLET, DELAYED RELEASE ORAL
Status: DISCONTINUED | OUTPATIENT
Start: 2023-10-16 | End: 2023-10-25 | Stop reason: HOSPADM

## 2023-10-16 RX ORDER — HYDROCORTISONE 1 %
1 CREAM (GRAM) TOPICAL EVERY 4 HOURS PRN
COMMUNITY

## 2023-10-16 RX ORDER — PANTOPRAZOLE SODIUM 40 MG/1
40 TABLET, DELAYED RELEASE ORAL
Status: DISCONTINUED | OUTPATIENT
Start: 2023-10-16 | End: 2023-10-16

## 2023-10-16 RX ORDER — CALCIUM CARBONATE 200(500)MG
1 TABLET,CHEWABLE ORAL DAILY
Status: DISCONTINUED | OUTPATIENT
Start: 2023-10-16 | End: 2023-10-17

## 2023-10-16 RX ORDER — OXYBUTYNIN CHLORIDE 5 MG/1
5 TABLET ORAL 2 TIMES DAILY
Status: DISCONTINUED | OUTPATIENT
Start: 2023-10-16 | End: 2023-10-25 | Stop reason: HOSPADM

## 2023-10-16 RX ORDER — FERROUS GLUCONATE 325 MG
324 TABLET ORAL
Status: DISCONTINUED | OUTPATIENT
Start: 2023-10-16 | End: 2023-10-25 | Stop reason: HOSPADM

## 2023-10-16 RX ORDER — KETOROLAC TROMETHAMINE 5 MG/ML
1 SOLUTION OPHTHALMIC 4 TIMES DAILY
Status: DISCONTINUED | OUTPATIENT
Start: 2023-10-16 | End: 2023-10-25 | Stop reason: HOSPADM

## 2023-10-16 RX ORDER — FERROUS SULFATE 325(65) MG
65 TABLET ORAL NIGHTLY
COMMUNITY

## 2023-10-16 RX ORDER — OXYCODONE HYDROCHLORIDE 5 MG/1
5 TABLET ORAL EVERY 8 HOURS PRN
COMMUNITY
End: 2023-10-25 | Stop reason: HOSPADM

## 2023-10-16 RX ORDER — METOPROLOL SUCCINATE 50 MG/1
50 TABLET, EXTENDED RELEASE ORAL DAILY
Status: DISCONTINUED | OUTPATIENT
Start: 2023-10-16 | End: 2023-10-20

## 2023-10-16 RX ORDER — NAPROXEN SODIUM 220 MG/1
81 TABLET, FILM COATED ORAL DAILY
Status: DISCONTINUED | OUTPATIENT
Start: 2023-10-16 | End: 2023-10-25 | Stop reason: HOSPADM

## 2023-10-16 RX ORDER — DEXTROSE 50 % IN WATER (D50W) INTRAVENOUS SYRINGE
25
Status: DISCONTINUED | OUTPATIENT
Start: 2023-10-16 | End: 2023-10-25 | Stop reason: HOSPADM

## 2023-10-16 RX ORDER — NAPROXEN SODIUM 220 MG/1
3 TABLET ORAL DAILY
Status: DISCONTINUED | OUTPATIENT
Start: 2023-10-16 | End: 2023-10-16

## 2023-10-16 RX ORDER — OXYCODONE HYDROCHLORIDE 5 MG/1
5 TABLET ORAL EVERY 4 HOURS PRN
Status: DISCONTINUED | OUTPATIENT
Start: 2023-10-16 | End: 2023-10-25 | Stop reason: HOSPADM

## 2023-10-16 RX ORDER — DORZOLAMIDE HCL 20 MG/ML
1 SOLUTION/ DROPS OPHTHALMIC 2 TIMES DAILY
Status: DISCONTINUED | OUTPATIENT
Start: 2023-10-16 | End: 2023-10-25 | Stop reason: HOSPADM

## 2023-10-16 RX ORDER — ROSUVASTATIN CALCIUM 20 MG/1
20 TABLET, COATED ORAL DAILY
Status: DISCONTINUED | OUTPATIENT
Start: 2023-10-16 | End: 2023-10-25 | Stop reason: HOSPADM

## 2023-10-16 RX ORDER — DEXTROSE MONOHYDRATE 100 MG/ML
0.3 INJECTION, SOLUTION INTRAVENOUS ONCE AS NEEDED
Status: DISCONTINUED | OUTPATIENT
Start: 2023-10-16 | End: 2023-10-25 | Stop reason: HOSPADM

## 2023-10-16 RX ORDER — BIOTIN 1 MG
1000 TABLET ORAL DAILY
Status: DISCONTINUED | OUTPATIENT
Start: 2023-10-16 | End: 2023-10-16

## 2023-10-16 RX ORDER — INSULIN LISPRO 100 [IU]/ML
0-15 INJECTION, SOLUTION INTRAVENOUS; SUBCUTANEOUS
Status: DISCONTINUED | OUTPATIENT
Start: 2023-10-16 | End: 2023-10-25 | Stop reason: HOSPADM

## 2023-10-16 RX ORDER — LISINOPRIL 20 MG/1
20 TABLET ORAL DAILY
Status: DISCONTINUED | OUTPATIENT
Start: 2023-10-16 | End: 2023-10-17

## 2023-10-16 RX ORDER — EXEMESTANE 25 MG/1
25 TABLET ORAL DAILY
Status: DISCONTINUED | OUTPATIENT
Start: 2023-10-16 | End: 2023-10-25 | Stop reason: HOSPADM

## 2023-10-16 RX ORDER — CLOBETASOL PROPIONATE 0.5 MG/G
1 OINTMENT TOPICAL DAILY
Status: DISCONTINUED | OUTPATIENT
Start: 2023-10-16 | End: 2023-10-16

## 2023-10-16 RX ORDER — FUROSEMIDE 40 MG/1
40 TABLET ORAL DAILY
Status: DISCONTINUED | OUTPATIENT
Start: 2023-10-16 | End: 2023-10-17

## 2023-10-16 RX ORDER — DIPHENHYDRAMINE HCL 25 MG
25 TABLET ORAL EVERY 6 HOURS PRN
Status: DISCONTINUED | OUTPATIENT
Start: 2023-10-16 | End: 2023-10-25 | Stop reason: HOSPADM

## 2023-10-16 RX ORDER — POLYETHYLENE GLYCOL 3350 17 G/17G
17 POWDER, FOR SOLUTION ORAL DAILY
COMMUNITY

## 2023-10-16 RX ORDER — TIZANIDINE 2 MG/1
2 TABLET ORAL 3 TIMES DAILY
Status: DISCONTINUED | OUTPATIENT
Start: 2023-10-16 | End: 2023-10-17

## 2023-10-16 RX ADMIN — VANCOMYCIN 750 MG: 750 INJECTION, SOLUTION INTRAVENOUS at 18:24

## 2023-10-16 RX ADMIN — LATANOPROST 1 DROP: 50 SOLUTION OPHTHALMIC at 22:22

## 2023-10-16 RX ADMIN — PANTOPRAZOLE SODIUM 40 MG: 40 TABLET, DELAYED RELEASE ORAL at 05:50

## 2023-10-16 RX ADMIN — DEXAMETHASONE 6 MG: 6 TABLET ORAL at 18:24

## 2023-10-16 RX ADMIN — TIZANIDINE 2 MG: 2 TABLET ORAL at 16:12

## 2023-10-16 RX ADMIN — INSULIN LISPRO 2 UNITS: 100 INJECTION, SOLUTION INTRAVENOUS; SUBCUTANEOUS at 12:06

## 2023-10-16 RX ADMIN — KETOROLAC TROMETHAMINE 1 DROP: 0.5 SOLUTION OPHTHALMIC at 22:21

## 2023-10-16 RX ADMIN — OXYCODONE HYDROCHLORIDE 5 MG: 5 TABLET ORAL at 18:00

## 2023-10-16 RX ADMIN — ACETAMINOPHEN 650 MG: 325 TABLET ORAL at 18:13

## 2023-10-16 RX ADMIN — Medication 4 L/MIN: at 01:30

## 2023-10-16 RX ADMIN — TIZANIDINE 2 MG: 2 TABLET ORAL at 22:22

## 2023-10-16 RX ADMIN — OXYBUTYNIN CHLORIDE 5 MG: 5 TABLET ORAL at 22:22

## 2023-10-16 RX ADMIN — INSULIN LISPRO 3 UNITS: 100 INJECTION, SOLUTION INTRAVENOUS; SUBCUTANEOUS at 18:28

## 2023-10-16 RX ADMIN — IPRATROPIUM BROMIDE AND ALBUTEROL SULFATE 3 ML: 2.5; .5 SOLUTION RESPIRATORY (INHALATION) at 07:48

## 2023-10-16 RX ADMIN — PIPERACILLIN SODIUM AND TAZOBACTAM SODIUM 4.5 G: 4; .5 INJECTION, SOLUTION INTRAVENOUS at 03:01

## 2023-10-16 RX ADMIN — Medication 3 L/MIN: at 21:00

## 2023-10-16 RX ADMIN — VANCOMYCIN 750 MG: 750 INJECTION, SOLUTION INTRAVENOUS at 05:50

## 2023-10-16 RX ADMIN — PIPERACILLIN SODIUM AND TAZOBACTAM SODIUM 4.5 G: 4; .5 INJECTION, SOLUTION INTRAVENOUS at 11:50

## 2023-10-16 RX ADMIN — IRON SUCROSE 200 MG: 20 INJECTION, SOLUTION INTRAVENOUS at 22:24

## 2023-10-16 RX ADMIN — DORZOLAMIDE HYDROCHLORIDE 1 DROP: 20 SOLUTION/ DROPS OPHTHALMIC at 22:22

## 2023-10-16 RX ADMIN — ROSUVASTATIN CALCIUM 20 MG: 20 TABLET, COATED ORAL at 14:10

## 2023-10-16 RX ADMIN — IPRATROPIUM BROMIDE AND ALBUTEROL SULFATE 3 ML: 2.5; .5 SOLUTION RESPIRATORY (INHALATION) at 20:16

## 2023-10-16 RX ADMIN — LATANOPROST 1 DROP: 50 SOLUTION OPHTHALMIC at 02:48

## 2023-10-16 RX ADMIN — APIXABAN 2.5 MG: 2.5 TABLET, FILM COATED ORAL at 18:05

## 2023-10-16 RX ADMIN — Medication 324 MG: at 18:24

## 2023-10-16 RX ADMIN — REMDESIVIR 100 MG: 100 INJECTION, POWDER, LYOPHILIZED, FOR SOLUTION INTRAVENOUS at 22:22

## 2023-10-16 RX ADMIN — DORZOLAMIDE HYDROCHLORIDE 1 DROP: 20 SOLUTION/ DROPS OPHTHALMIC at 02:48

## 2023-10-16 RX ADMIN — IPRATROPIUM BROMIDE AND ALBUTEROL SULFATE 3 ML: 2.5; .5 SOLUTION RESPIRATORY (INHALATION) at 12:11

## 2023-10-16 RX ADMIN — OXYCODONE HYDROCHLORIDE 5 MG: 5 TABLET ORAL at 22:33

## 2023-10-16 RX ADMIN — PIPERACILLIN SODIUM AND TAZOBACTAM SODIUM 4.5 G: 4; .5 INJECTION, SOLUTION INTRAVENOUS at 18:24

## 2023-10-16 ASSESSMENT — ACTIVITIES OF DAILY LIVING (ADL)
PATIENT'S MEMORY ADEQUATE TO SAFELY COMPLETE DAILY ACTIVITIES?: YES
ADL_ASSISTANCE: INDEPENDENT
BATHING_ASSISTANCE: MODERATE
BATHING: NEEDS ASSISTANCE
HEARING - RIGHT EAR: DIFFICULTY WITH NOISE
ADL_ASSISTANCE: INDEPENDENT
WALKS IN HOME: NEEDS ASSISTANCE
GROOMING: INDEPENDENT
HOME_MANAGEMENT_TIME_ENTRY: 15
JUDGMENT_ADEQUATE_SAFELY_COMPLETE_DAILY_ACTIVITIES: YES
HEARING - LEFT EAR: DIFFICULTY WITH NOISE
TOILETING: NEEDS ASSISTANCE
FEEDING YOURSELF: INDEPENDENT
ADEQUATE_TO_COMPLETE_ADL: YES
DRESSING YOURSELF: NEEDS ASSISTANCE

## 2023-10-16 ASSESSMENT — COGNITIVE AND FUNCTIONAL STATUS - GENERAL
MOVING TO AND FROM BED TO CHAIR: A LOT
CLIMB 3 TO 5 STEPS WITH RAILING: A LITTLE
CLIMB 3 TO 5 STEPS WITH RAILING: A LOT
DRESSING REGULAR LOWER BODY CLOTHING: A LOT
MOVING FROM LYING ON BACK TO SITTING ON SIDE OF FLAT BED WITH BEDRAILS: A LITTLE
TURNING FROM BACK TO SIDE WHILE IN FLAT BAD: A LOT
STANDING UP FROM CHAIR USING ARMS: A LITTLE
WALKING IN HOSPITAL ROOM: A LITTLE
MOBILITY SCORE: 17
DAILY ACTIVITIY SCORE: 17
TURNING FROM BACK TO SIDE WHILE IN FLAT BAD: A LOT
TOILETING: A LOT
MOVING FROM LYING ON BACK TO SITTING ON SIDE OF FLAT BED WITH BEDRAILS: A LITTLE
HELP NEEDED FOR BATHING: A LOT
MOBILITY SCORE: 16
DRESSING REGULAR UPPER BODY CLOTHING: A LITTLE
MOVING TO AND FROM BED TO CHAIR: A LITTLE
WALKING IN HOSPITAL ROOM: A LITTLE
DRESSING REGULAR LOWER BODY CLOTHING: A LOT
PATIENT BASELINE BEDBOUND: NO

## 2023-10-16 ASSESSMENT — PAIN - FUNCTIONAL ASSESSMENT
PAIN_FUNCTIONAL_ASSESSMENT: 0-10

## 2023-10-16 ASSESSMENT — PAIN SCALES - GENERAL
PAINLEVEL_OUTOF10: 5 - MODERATE PAIN
PAINLEVEL_OUTOF10: 3
PAINLEVEL_OUTOF10: 0 - NO PAIN
PAINLEVEL_OUTOF10: 9

## 2023-10-16 NOTE — PROGRESS NOTES
Occupational Therapy    Evaluation/Treatment    Patient Name: Samira López  MRN: [id number]  : 1940  Today's Date: 10/16/23          Assessment:  OT Assessment: decreased LB ADLs, functional mobility and transfers  Prognosis: Good  Barriers to Discharge: None  Evaluation/Treatment Tolerance: Patient tolerated treatment well  Medical Staff Made Aware: Yes  End of Session Communication: PCT/NA/CTA, Bedside nurse  End of Session Patient Position: Bed, 3 rail up  OT Assessment Results: Decreased ADL status, Decreased safe judgment during ADL, Decreased endurance, Decreased functional mobility  Prognosis: Good  Barriers to Discharge: None  Evaluation/Treatment Tolerance: Patient tolerated treatment well  Medical Staff Made Aware: Yes  Strengths: Insight into problems  Barriers to Participation: Comorbidities  Plan:  Treatment Interventions: ADL retraining, Functional transfer training, Endurance training  OT Frequency: 4 times per week  OT Discharge Recommendations: Moderate intensity level of continued care  OT Recommended Transfer Status: Assist of 1  OT - OK to Discharge: Yes  Treatment Interventions: ADL retraining, Functional transfer training, Endurance training    Subjective   Current Problem:  1. Acute respiratory failure (CMS/HCC)        2. Cellulitis and abscess of toe of left foot        3. COVID-19        4. Acute respiratory failure with hypoxia (CMS/HCC)        5. Generalized weakness          General:   OT Received On: 10/16/23  General  Reason for Referral: decreased functional status.  Referred By: Dr Jaquan Ríos  Past Medical History Relevant to Rehab: Acute respirtatory failure, TMA, DM  Prior to Session Communication: Bedside nurse  Patient Position Received: Up in chair  General Comment: Patient is s/p left TMA approximately 2wks ago.  She went to SNf for rehab for 2weeks.  She was discharged home and the next day she presented to the ED with weakness and  fall.  Precautions:  Hearing/Visual Limitations: wears glasses  LE Weight Bearing Status: Heel Weight Bearing in Post-Op Shoe  Medical Precautions: Fall precautions     Pain:  Pain Assessment  Pain Assessment: 0-10  Pain Score: 0 - No pain    Objective   Cognition:  Overall Cognitive Status: Within Functional Limits      Home Living:  Type of Home: Condo  Lives With: Spouse  Home Adaptive Equipment: Wheelchair-manual (s/p discharge from SNF patient was functioning at a wheelchair level)  Home Layout: One level  Home Access: Level entry  Bathroom Shower/Tub: Tub/shower unit  Bathroom Toilet: Standard    Prior Function:  Level of [place]: Independent with ADLs and functional transfers  ADL Assistance: Independent  Homemaking Assistance: Independent  Ambulatory Assistance: Independent  Prior Function Comments: levels of function listed are prior to TMA     ADL:  Eating Assistance: Independent  Grooming Assistance: Independent  Bathing Assistance: Moderate  Bathing Deficit: Increased time to complete , Perineal area, Right lower leg including foot, Left lower leg including foot  UE Dressing Assistance: Minimal  UE Dressing Deficit: Thread RUE (due to IV lines)  LE Dressing Assistance: Moderate  LE Dressing Deficit: Increased time to complete, Thread RLE into pants, Thread LLE into pants, Thread RLE into underwear, Thread LLE into underwear, Pull up over hips  Toileting Assistance with Device: Maximal  Toileting Deficit: Steadying, Increased time to complete, Use of bedpan/urinal setup, Clothing management up, Clothing management down, Perineal hygiene  ADL Comments: Due to recent TMA decreased ADL performance of the LB and mobility/transfers  Activities of Daily Living: LE Dressing  LE Dressing: Yes  Sock Level of Assistance: Independent  Shoe Level of Assistance: Minimum assistance (needed assist to doff surgical shoe on left side)  LE Dressing Where Assessed: Edge of bed    Toileting  Toileting Level of  Assistance: Maximum assistance  Where Assessed: Other (Comment) (from chair due to used bed pan in chair)  Toileting Comments: needed assist with clothes management, and hygiene  Activity Tolerance:  Endurance: Decreased tolerance for upright activites (decreased aerobic capacity, has 2L 02 per nasal cannula)     Bed Mobility/Transfers: Bed Mobility  Bed Mobility: Yes  Bed Mobility 1  Bed Mobility 1: Sitting to supine  Level of Assistance 1: Minimum assistance  Bed Mobility Comments 1: needed assist with LLE     vision:Vision - Basic Assessment  Current Vision: Wears glasses all the time    Sensation:  Light Touch: No apparent deficits  Sharp/Dull: No apparent deficits  Proprioception: No apparent deficits    Strength:  Strength Comments: BUE 3+/5 grossly     Coordination:  Movements are Fluid and Coordinated: Yes     Hand Function:  Hand Function  Gross Grasp: Functional  Coordination: Impaired (impaired FMC bilaterally)      Outcome Measures: [facility] Daily Activity  Putting on and taking off regular lower body clothing: A lot  Bathing (including washing, rinsing, drying): A lot  Putting on and taking off regular upper body clothing: A little  Toileting, which includes using toilet, bedpan or urinal: A lot  Taking care of personal grooming such as brushing teeth: None  Eating Meals: None  Daily Activity - Total Score: 17    Goals:  Problem: Bathing  Goal: STG - Patient will bathe body UB/LB independent with AE as needed  Outcome: Progressing     Problem: Dressings Lower Extremities  Goal: LTG - Patient will utilize adaptive techniques/equipment to dress lower body independently  Outcome: Progressing     Problem: Mobility  Goal: Goal 1Patient will perform functional mobility to and from the bathroom with close supervision and 2ww  Outcome: Progressing     Problem: Toileting  Goal: LTG - Patient will demonstrate use of appropriate intervention for safe toileting independent with 2ww  Outcome: Progressing     Problem:  Transfers  Goal: LTG - Patient will demonstrate safe transfer techniques independent with 2ww  Outcome: Progressing

## 2023-10-16 NOTE — CARE PLAN
Problem: Bathing  Goal: STG - Patient will bathe body UB/LB independent with AE as needed  Outcome: Progressing     Problem: Dressings Lower Extremities  Goal: LTG - Patient will utilize adaptive techniques/equipment to dress lower body independently  Outcome: Progressing     Problem: Mobility  Goal: Goal 1Patient will perform functional mobility to and from the bathroom with close supervision and 2ww  Outcome: Progressing     Problem: Toileting  Goal: LTG - Patient will demonstrate use of appropriate intervention for safe toileting independent with 2ww  Outcome: Progressing     Problem: Transfers  Goal: LTG - Patient will demonstrate safe transfer techniques independent with 2ww  Outcome: Progressing

## 2023-10-16 NOTE — CARE PLAN
The patient's goals for the shift include rest     The clinical goals for the shift include  no falls, maintain SpO2 >92%    Over the shift, the patient did not make progress toward the following goals. Barriers to progression include medical history. Recommendations to address these barriers include assist patient with transfers/ambulation, monitor V/S every 4 hours and PRN.

## 2023-10-16 NOTE — CARE PLAN
Problem: Respiratory  Goal: Clear secretions with interventions this shift  Outcome: Progressing  Goal: Minimize anxiety/maximize coping throughout shift  Outcome: Progressing  Goal: Minimal/no exertional discomfort or dyspnea this shift  Outcome: Progressing  Goal: No signs of respiratory distress (eg. Use of accessory muscles. Peds grunting)  Outcome: Progressing  Goal: Patent airway maintained this shift  Outcome: Progressing  Goal: Tolerate pulmonary toileting this shift  Outcome: Progressing  Goal: Verbalize decreased shortness of breath this shift  Outcome: Progressing  Goal: Wean oxygen to maintain O2 saturation per order/standard this shift  Outcome: Progressing

## 2023-10-16 NOTE — CONSULTS
Vascular Access Team  Consult     Visit Date: 10/16/2023      Patient Name: Samira López         MRN: [id number]                Reason for Consult: picc line placement             Assessment: Chart reviewed for any contraindications, pt had a L lumpectomy cannot use L arm           Plan: R side dual lumen picc line        Muna Hernandez RN  10/16/2023  5:35 PM

## 2023-10-16 NOTE — PROGRESS NOTES
"Vancomycin Dosing by Pharmacy- INITIAL    Samira López is a 82 y.o. year old female who Pharmacy has been consulted for vancomycin dosing for CNS/meningoencephalitis. Based on the patient's indication and renal status this patient will be dosed based on a goal AUC of 400-600.     Renal function is currently stable.    Visit Vitals  /57 (BP Location: Right arm)   Pulse 91   Temp 36.7 °C (98.1 °F) (Oral)   Resp 16        Lab Results   Component Value Date    CREATININE 0.80 10/15/2023    CREATININE 0.74 10/04/2023    CREATININE 0.7 09/21/2023    CREATININE 0.8 09/20/2023    CREATININE 0.7 09/19/2023    CREATININE 0.7 09/18/2023        Patient weight is No results found for: \"PTWEIGHT\"    No results found for: \"CULTURE\"     No intake/output data recorded.  [unfilled]    Lab Results   Component Value Date    PATIENTTEMP 37.0 05/13/2023          Assessment/Plan     Patient has already been given a loading dose of 1250 mg.  Will initiate vancomycin maintenance,  750 mg every 12 hours.    This dosing regimen is predicted by [employer] to result in the following pharmacokinetic parameters:    Loading dose: N/A  Regimen: 750 mg IV every 12 hours.  Start time: 05:57 on 10/16/2023  Exposure target: AUC24 (range)400-600 mg/L.hr   AUC24,ss: 466 mg/L.hr  Probability of AUC24 > 400: 67 %  Ctrough,ss: 15.8 mg/L  Probability of Ctrough,ss > 20: 27 %  Probability of nephrotoxicity (Lodise REUBEN 2009): 11 %      Follow-up level will be ordered on 10/17/23 at 0600 unless clinically indicated sooner.  Will continue to monitor renal function daily while on vancomycin and order serum creatinine at least every 48 hours if not already ordered.  Follow for continued vancomycin needs, clinical response, and signs/symptoms of toxicity.       Aubrie Hooker, [facility]       "

## 2023-10-16 NOTE — CONSULTS
Inpatient consult to Podiatry  Consult performed by: Anders Loera DPM  Consult ordered by: MALIK Dobbs-CNP  Reason for consult: left lower extremity s/p transmet amputation, multiple venous stasis ulcers        Reason For Consult  Status post transmetatarsal amputation status post transmetatarsal amputation, left foot    History Of Present Illness  Samira López is a 82 y.o. female well-known to me presenting today with generalized weakness and recurrent falls and found to be covid-19+.  She was previously admittedIn mid September for left foot abscess cellulitis and osteomyelitis where I performed a transmetatarsal amputation on the left foot.  She was discharged to rehab on IV antibiotics.  Cultures at that time were growing Pseudomonas. I was managing her as an outpatient in the interim.  She was discharged from infectious disease and recently discharged from nursing facility and was ultimately sent home on Saturday where she quickly regressed.  She states she fell twice on Saturday following discharge from the rehab home.  She was found to have foul-smelling discharge from the left lower extremity and was further found to have COVID-19 infection.  She was brought to [facility] and ultimately admitted.She was started on broad-spectrum antibiotics as well as started on remdesivir. She denies any current fevers or chills nausea or vomiting.  She does have a productive cough.  Endorses pain in the left heel.  She denies new concerns otherwise.     Past Medical History  She has a past medical history of Abnormal findings on diagnostic imaging of heart and coronary circulation (07/30/2019), Abrasion, unspecified foot, initial encounter (06/15/2020), Body mass index (BMI) 31.0-31.9, adult (02/22/2021), Body mass index (BMI) 37.0-37.9, adult, Body mass index (BMI) 38.0-38.9, adult, Body mass index (BMI)30.0-30.9, adult (07/25/2022), Body mass index (BMI)30.0-30.9, adult (06/16/2021), Body  mass index (BMI)30.0-30.9, adult (02/28/2022), Body mass index (BMI)30.0-30.9, adult (06/02/2022), Body mass index (BMI)30.0-30.9, adult (04/04/2022), Cellulitis of left lower limb (09/20/2022), Combined forms of age-related cataract, bilateral (07/27/2022), Dyskinesia of esophagus (12/05/2019), Elevated blood-pressure reading, without diagnosis of hypertension (11/03/2015), Epistaxis (11/25/2019), Glaucoma, Hypoxemia (08/17/2020), Idiopathic sleep related nonobstructive alveolar hypoventilation (08/17/2020), Idiopathic sleep related nonobstructive alveolar hypoventilation (08/17/2020), Local infection of the skin and subcutaneous tissue, unspecified (04/13/2020), Nipple discharge (06/02/2017), Non-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 foot with unspecified severity (CMS/HCC) (07/12/2022), Non-pressure chronic ulcer of unspecified part of left lower leg with unspecified severity (CMS/HCC) (06/16/2021), Other conditions influencing health status (05/19/2015), Other disorders of electrolyte and fluid balance, not elsewhere classified (06/26/2019), Other pulmonary embolism with acute cor pulmonale (CMS/HCC) (06/16/2021), Other specified personal risk factors, not elsewhere classified (07/31/2019), Other specified postprocedural states (06/29/2017), Other symptoms and signs involving the nervous system (08/17/2020), Pain in unspecified foot (04/29/2020), Pain in unspecified hand (07/30/2013), Pain in unspecified hand (06/22/2020), Pain in unspecified hip (06/12/2013), Pain in unspecified hip (06/22/2020), Pain, unspecified (02/28/2022), Personal history of diseases of the skin and subcutaneous tissue (02/12/2020), Personal history of other diseases of the circulatory system (06/16/2021), Personal history of other diseases of the musculoskeletal system and connective tissue (12/17/2020), Personal history of other diseases of the musculoskeletal system and connective tissue (12/04/2020), Personal history of other  diseases of the nervous system and sense organs (08/17/2020), Personal history of other diseases of the respiratory system (05/19/2015), Personal history of other diseases of the respiratory system (12/23/2017), Personal history of other specified conditions (06/29/2017), Personal history of other specified conditions (07/05/2018), Personal history of other specified conditions (06/29/2017), Personal history of other specified conditions (07/31/2019), Personal history of other specified conditions (06/22/2020), Personal history of pulmonary embolism (04/06/2022), Preglaucoma, unspecified, unspecified eye, Radiculopathy, lumbar region (02/23/2022), Shortness of breath (08/08/2023), Sleep apnea, unspecified (02/12/2020), Spondylosis without myelopathy or radiculopathy, lumbar region (02/23/2022), Unspecified cataract (06/17/2020), Unspecified cataract, and Unspecified cataract.    Surgical History  She has a past surgical history that includes Cholecystectomy (06/12/2013); Other surgical history (06/17/2020); Foot surgery (06/29/2017); CT angio aorta and bilateral iliofemoral runoff w and or wo IV contrast (4/25/2020); CT guided percutaneous biopsy bone deep (12/13/2018); CT angio aorta and bilateral iliofemoral runoff w and or wo IV contrast (9/8/2022); and CT angio aorta and bilateral iliofemoral runoff w and or wo IV contrast (5/17/2023).     Social History  She reports that she quit smoking about 23 years ago. Her smoking use included cigarettes. She has a 22.50 pack-year smoking history. She has never used smokeless tobacco. She reports current alcohol use. She reports that she does not currently use drugs.    Family History  Family History   Problem Relation Name Age of Onset    Breast cancer Mother      Coronary artery disease Father          Allergies  Cephalosporins, Ciprofloxacin, Codeine, and Epoetin shankar    Review of Systems    REVIEW OF SYSTEMS  GENERAL:  Negative for malaise, significant weight loss,  fever  HEENT:  No changes in hearing or vision, no nose bleeds or other nasal problems and Negative for frequent or significant headaches  NECK:  Negative for lumps, goiter, pain and significant neck swelling  RESPIRATORY:  Negative for cough, wheezing and shortness of breath  CARDIOVASCULAR:  Negative for chest pain, leg swelling and palpitations  GI:  Negative for abdominal discomfort, blood in stools or black stools and change in bowel habits  :  Negative for dysuria, frequency and incontinence  MUSCULOSKELETAL:  Pain in left foot  SKIN:  Multiple ulcerations to the left lower extremity  PSYCH:  Negative for sleep disturbance, mood disorder and recent psychosocial stressors  HEMATOLOGY/LYMPHOLOGY:  Negative for prolonged bleeding, bruising easily, and swollen nodes.  ENDOCRINE:  Negative for cold or heat intolerance, polyuria, polydipsia and goiter  NEURO: Negative, denies any burning, tingling or numbness     Physical Exam    Objective: Patient seen bedside.  PICC line nurse actively attempting IV placement.  Patient is alert aware and oriented x3.  She does not appear to be in acute distress.  Vasc: DP and PT pulses are faintly palpable. Skin is atrophic to the dorsum left foot. Hair growth absent.     Neuro:  sensation intact to left foot to light touch. Muscle strength +5/5.     Derm: Left foot status post transmetatarsal amputation.  The incision with Prolene suture intact.  This is well coapted.  There is no signs of ischemia or incisional dehiscence.  She does have several superficial venous stasis ulcerations to the dorsum foot, lateral foot, and anterior leg.  These all appear granular without significant depth.  There is findings consistent with chronic venous stasis dermatitis.  Lipodermatosclerosis as well as hemosiderin deposition noted.  There is a posterior heel decubitus ulcer measuring roughly 1.1 cm x 1.5 cm.  There is a mixed fibrogranular base.  No active purulent drainage.  Overall there  "are no significant signs of bacterial infection involving the left lower extremity.  Erythema is present secondary to dependent rubor and venous stasis dermatitis.  Low concern for ascending cellulitis or infectious process.    Ortho: Significant tenderness on palpation to the multiple ulcerations to the left lower extremity.  Status post transmetatarsal amputation.     Last Recorded Vitals  Blood pressure 117/55, pulse 72, temperature 36.4 °C (97.5 °F), temperature source Temporal, resp. rate 20, height 1.791 m (5' 10.5\"), weight 81.3 kg (179 lb 3.7 oz), SpO2 100 %.    Relevant Results  Previous wound cultures from last admission in September growing Pseudomonas  Assessment/Plan     Status post transmetatarsal amputation, left foot, Routine healing  Venous stasis ulceration, left leg and foot  Peripheral venous insufficiency  Peripheral arterial disease    Patient presenting with patient presents with acute respiratory failure in the setting of active COVID-19 infection, stage IV breast cancer with metastasis, Peripheral arterial disease,and recent transmetatarsal amputation of the left foot secondary to underlying osteomyelitis.  I been managing the left foot postoperatively as an outpatient since her last discharge.  She seems to be doing well overall from the standpoint.  I did remove the Unna boot that I had placed last week in my office.  Overall the incision site as well as the multiple superficial venous stasis ulcers appear to be progressing well.        I would like to continue Unna boot therapy.  The transmetatarsal amputation incision site was dressed with Betadine today, and the multiple venous stasis ulcers were dressed with calcium alginate and Unna boot placed from the distal foot to below the knee.  Coban was then placed for compression.  We will plan to perform dressing changes to the foot roughly 3 times weekly throughout this admission.  I have low concern for active infection to the left lower " extremity as this looks remarkably improved compared to her last admission and even improved since last office visit with me last week.  I defer antibiotic at discretion of primary team or infectious disease doctors. left lower extremity overall is very stable clinically.     I did obtain a swab culture of left leg ulcer at the request of primary and infectious disease.      I will also order heel protector boots as patient is at high risk for decubitus heel ulcer and has developed a small 1 on the back of the left heel.Patient is in agreement above plan.    I will continue to follow closely throughout admission.      I spent 30 minutes in the professional and overall care of this patient.    Anders Loera DPM  10/16/2023  3:20 PM

## 2023-10-16 NOTE — CONSULTS
Inpatient consult to Infectious Diseases  Consult performed by: Carlos Malone MD  Consult ordered by: Jeanmarie Hernandez MD        Primary MD: Shannan Ceballos MD    Reason For Consult  Coronavirus infection    History Of Present Illness  Samira López is a 82 y.o. female presenting with generalized weakness and recurrent falls.  She was recently admitted to the hospital for infected left foot ulcer and was discharged on IV antibiotics for treatment for Pseudomonas with a tentative stop date of 10/2/2023.  She is readmitted on account of generalized weakness.  Onset was a couple of days prior to presentation, gradual, constant, with interval worsening.  She came to the hospital for further evaluation and management.  Her baseline oxygen requirement is about 3 L, but she required up to 5 L since admission.  She denies any fever or chills.  Past Medical History  She has a past medical history of Abnormal findings on diagnostic imaging of heart and coronary circulation (07/30/2019), Abrasion, unspecified foot, initial encounter (06/15/2020), Body mass index (BMI) 31.0-31.9, adult (02/22/2021), Body mass index (BMI) 37.0-37.9, adult, Body mass index (BMI) 38.0-38.9, adult, Body mass index (BMI)30.0-30.9, adult (07/25/2022), Body mass index (BMI)30.0-30.9, adult (06/16/2021), Body mass index (BMI)30.0-30.9, adult (02/28/2022), Body mass index (BMI)30.0-30.9, adult (06/02/2022), Body mass index (BMI)30.0-30.9, adult (04/04/2022), Cellulitis of left lower limb (09/20/2022), Combined forms of age-related cataract, bilateral (07/27/2022), Dyskinesia of esophagus (12/05/2019), Elevated blood-pressure reading, without diagnosis of hypertension (11/03/2015), Epistaxis (11/25/2019), Glaucoma, Hypoxemia (08/17/2020), Idiopathic sleep related nonobstructive alveolar hypoventilation (08/17/2020), Idiopathic sleep related nonobstructive alveolar hypoventilation (08/17/2020), Local infection of the skin and subcutaneous tissue,  unspecified (04/13/2020), Nipple discharge (06/02/2017), Non-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 foot with unspecified severity (CMS/[facility]) (07/12/2022), Non-pressure chronic ulcer of unspecified part of left lower leg with unspecified severity (CMS/[facility]) (06/16/2021), Other conditions influencing health status (05/19/2015), Other disorders of electrolyte and fluid balance, not elsewhere classified (06/26/2019), Other pulmonary embolism with acute cor pulmonale (CMS/[facility]) (06/16/2021), Other specified personal risk factors, not elsewhere classified (07/31/2019), Other specified postprocedural states (06/29/2017), Other symptoms and signs involving the nervous system (08/17/2020), Pain in unspecified foot (04/29/2020), Pain in unspecified hand (07/30/2013), Pain in unspecified hand (06/22/2020), Pain in unspecified hip (06/12/2013), Pain in unspecified hip (06/22/2020), Pain, unspecified (02/28/2022), Personal history of diseases of the skin and subcutaneous tissue (02/12/2020), Personal history of other diseases of the circulatory system (06/16/2021), Personal history of other diseases of the musculoskeletal system and connective tissue (12/17/2020), Personal history of other diseases of the musculoskeletal system and connective tissue (12/04/2020), Personal history of other diseases of the nervous system and sense organs (08/17/2020), Personal history of other diseases of the respiratory system (05/19/2015), Personal history of other diseases of the respiratory system (12/23/2017), Personal history of other specified conditions (06/29/2017), Personal history of other specified conditions (07/05/2018), Personal history of other specified conditions (06/29/2017), Personal history of other specified conditions (07/31/2019), Personal history of other specified conditions (06/22/2020), Personal history of pulmonary embolism (04/06/2022), Preglaucoma, unspecified, unspecified eye, Radiculopathy, lumbar region  (2022), Shortness of breath (2023), Sleep apnea, unspecified (2020), Spondylosis without myelopathy or radiculopathy, lumbar region (2022), Unspecified cataract (2020), Unspecified cataract, and Unspecified cataract.    Surgical History  She has a past surgical history that includes Cholecystectomy (2013); Other surgical history (2020); Foot surgery (2017); CT angio aorta and bilateral iliofemoral runoff w and or wo IV contrast (2020); CT guided percutaneous biopsy bone deep (2018); CT angio aorta and bilateral iliofemoral runoff w and or wo IV contrast (2022); and CT angio aorta and bilateral iliofemoral runoff w and or wo IV contrast (2023).     Social History     Occupational History    Not on file   Tobacco Use    Smoking status: Former     Packs/day: 1.50     Years: 15.00     Additional pack years: 0.00     Total pack years: 22.50     Types: Cigarettes     Quit date: 2000     Years since quittin.8    Smokeless tobacco: Never   Vaping Use    Vaping Use: Never used   Substance and Sexual Activity    Alcohol use: Yes     Comment: rarley    Drug use: Not Currently    Sexual activity: Not on file     Travel History   Travel since 23    No documented travel since 23            Family History  Family History   Problem Relation Name Age of Onset    Breast cancer Mother      Coronary artery disease Father       Allergies  Cephalosporins, Ciprofloxacin, Codeine, and Epoetin shankar     Immunization History   Administered Date(s) Administered    Flu vaccine (IIV4), preservative free *Check age/dose* 2019    Flu vaccine, quadrivalent, high-dose, preservative free, age 65y+ (FLUZONE) 2020, 2022    Influenza, High Dose Seasonal, Preservative Free 10/11/2017, 2018    Influenza, Unspecified 10/24/2022    Influenza, seasonal, injectable, preservative free 2011    Pfizer Purple Cap SARS-CoV-2 2021, 2021,  09/06/2021    Pneumococcal conjugate vaccine, 13-valent (PREVNAR 13) 11/25/2019    Pneumococcal polysaccharide vaccine, 23-valent, age 2 years and older (PNEUMOVAX 23) 02/01/2009    Pneumococcal, Unspecified 05/25/2020    SARS-CoV-2, Unspecified 06/05/2023     Medications  Home medications:  Medications Prior to Admission   Medication Sig Dispense Refill Last Dose    acetaminophen (Tylenol) 325 mg tablet Take 1 tablet (325 mg) by mouth every 6 hours if needed for mild pain (1 - 3).       aflibercept (Eylea) 2 mg/0.05 mL intra-ocular injection Inject into the eye. Take as direted       amLODIPine (Norvasc) 5 mg tablet Take 1 tablet (5 mg) by mouth once daily.       amoxicillin (Amoxil) 500 mg capsule Take 1 capsule (500 mg) by mouth 2 times a day.       ascorbic acid (Vitamin C) 500 mg tablet Take 1 tablet (500 mg) by mouth once daily.       aspirin (Aspirin Childrens) 81 mg chewable tablet Chew 1 tablet (81 mg) once daily.       biotin 1 mg tablet Take by mouth once daily.       calcium citrate 250 mg calcium tablet Take 2 tablets (500 mg) by mouth once daily.       cholecalciferol (Vitamin D-3) 10 mcg (400 unit) tablet,chewable Chew 2 tablets (20 mcg) once daily. 180 tablet 3     cholecalciferol (Vitamin D3) 50 mcg (2,000 unit) capsule Take 1 capsule (50 mcg) by mouth early in the morning..       ciprofloxacin (Cipro) 500 mg tablet Take 1 tablet (500 mg) by mouth every 12 hours.       clobetasol (Temovate) 0.05 % ointment Apply 1 Application topically once daily.       clopidogrel (Plavix) 75 mg tablet Take 1 tablet (75 mg) by mouth once daily.       diphenhydrAMINE (Sominex) 25 mg tablet Take 1 tablet (25 mg) by mouth every 6 hours if needed for itching.       dorzolamide (Trusopt) 2 % ophthalmic solution Administer 1 drop into both eyes 2 times a day. 9 mL 3     Eliquis 5 mg tablet Take 0.5 tablets (2.5 mg) by mouth 2 times a day.       esomeprazole (NexIUM 24HR) 20 mg DR capsule Take 1 capsule (20 mg) by mouth  once daily.       exemestane (Aromasin) 25 mg tablet Take 1 tablet (25 mg total) by mouth once daily.  Take after a meal.  Try to take at the same time each day. 30 tablet 2     ferrous gluconate (Ferate) 240 (27 Fe) MG tablet Take 1 tablet (27 mg) by mouth 2 times a day.       furosemide (Lasix) 20 mg tablet Take 2 tablets (40 mg) by mouth once daily.       HySept 0.25 % external solution APPLY AND CLEAN WOUND BED       Ibrance 100 mg tablet 1 tablet (100 mg).       ketorolac (Acular) 0.5 % ophthalmic solution instill 1 drop into the right eye four times every day as needed       latanoprost (Xalatan) 0.005 % ophthalmic solution INSTILL  1 DROP INTO BOTH EYES EVERY DAY AT BEDTIME 7.5 mL 3     lisinopril 20 mg tablet Take 1 tablet (20 mg) by mouth once daily.       loperamide HCl (IRINA ORAL) Administer into the right eye once daily.       metoprolol succinate XL (Toprol-XL) 50 mg 24 hr tablet Take 1 tablet (50 mg) by mouth once daily.       omega 3-dha-epa-fish oil (Fish OiL) 1,200 (144-216) mg capsule Take by mouth. as directed Orally       oxybutynin XL (Ditropan-XL) 10 mg 24 hr tablet Take 1 tablet (10 mg) by mouth once daily.       oxybutynin XL (Ditropan-XL) 5 mg 24 hr tablet Take 1 tablet (5 mg) by mouth once daily.       oxygen (O2) gas therapy Administer 4 L into affected nostril(s) once daily at bedtime.       palbociclib (IBRANCE ORAL) Take 1 capsule by mouth once daily. 125mg oral capsule, for 21 days off 7       palbociclib (Ibrance) 100 mg tablet TAKE ONE (1) TABLET BY MOUTH ONCE A DAY ON DAYS 1-21 OF A 28-DAY CYCLE 21 tablet 2     pantoprazole (ProtoNix) 40 mg EC tablet Take by mouth once daily.       rosuvastatin (Crestor) 20 mg tablet Take 1 tablet (20 mg) by mouth once daily. 90 tablet 0     tiZANidine (Zanaflex) 2 mg capsule Take 1 capsule (2 mg) by mouth every 6 hours if needed (mild pain).       trospium (Sanctura XR) 60 mg 24 hour capsule Take 1 capsule (60 mg) by mouth once daily. on an empty  "stomach every morning Orally Once a day for 30 day(s)        Current medications:  Scheduled medications  amLODIPine, 5 mg, oral, Daily  ascorbic acid, 500 mg, oral, Daily with breakfast  aspirin, 81 mg, oral, Daily  calcium carbonate, 1 tablet, oral, Daily  cholecalciferol, 50 mcg, oral, Daily  cholecalciferol, 125 mcg, oral, Daily  dexAMETHasone, 6 mg, oral, BID after meals  dorzolamide, 1 drop, Both Eyes, BID  ferrous gluconate, 324 mg, oral, BID after meals  furosemide, 40 mg, oral, Daily  insulin lispro, 0-15 Units, subcutaneous, TID with meals  ipratropium-albuteroL, 3 mL, nebulization, TID  latanoprost, 1 drop, Both Eyes, Nightly  lisinopril, 20 mg, oral, Daily  metoprolol succinate XL, 50 mg, oral, Daily  oxybutynin, 5 mg, oral, BID  oxygen, , inhalation, Nightly  pantoprazole, 40 mg, oral, Daily before breakfast  piperacillin-tazobactam, 4.5 g, intravenous, q8h  remdesivir, 100 mg, intravenous, q24h  rosuvastatin, 20 mg, oral, Daily  sodium hypochlorite, , irrigation, Daily  tiZANidine, 2 mg, oral, TID  vancomycin-diluent combo no.1, 750 mg, intravenous, q12h      Continuous medications     PRN medications  PRN medications: acetaminophen **OR** acetaminophen **OR** acetaminophen, acetaminophen, benzocaine-menthol, dextromethorphan-guaifenesin, dextrose 10 % in water (D10W), dextrose, diphenhydrAMINE, glucagon, guaiFENesin, polyethylene glycol    Review of Systems   All other systems reviewed and are negative.       Objective  Range of Vitals (last 24 hours)  Heart Rate:  [62-91]   Temp:  [36.4 °C (97.5 °F)-36.7 °C (98.1 °F)]   Resp:  [16-23]   BP: (117-143)/(48-59)   Height:  [177.8 cm (5' 10\")-179.1 cm (5' 10.5\")]   Weight:  [79.9 kg (176 lb 2.4 oz)-87.3 kg (192 lb 7.4 oz)]   SpO2:  [91 %-100 %]   Daily Weight  10/16/23 : 81.3 kg (179 lb 3.7 oz)    Body mass index is 25.35 kg/m².     Physical Exam  Constitutional:       Appearance: Normal appearance.   HENT:      Head: Normocephalic and atraumatic.      " Nose: Nose normal.   Eyes:      Extraocular Movements: Extraocular movements intact.      Conjunctiva/sclera: Conjunctivae normal.   Cardiovascular:      Rate and Rhythm: Normal rate and regular rhythm.      Pulses: Normal pulses.      Heart sounds: Normal heart sounds.   Pulmonary:      Breath sounds: Examination of the right-upper field reveals decreased breath sounds. Examination of the left-upper field reveals decreased breath sounds. Examination of the right-middle field reveals decreased breath sounds. Examination of the left-middle field reveals decreased breath sounds. Examination of the right-lower field reveals decreased breath sounds. Examination of the left-lower field reveals decreased breath sounds. Decreased breath sounds present.   Abdominal:      General: Bowel sounds are normal.      Palpations: Abdomen is soft.   Musculoskeletal:      Cervical back: Normal range of motion and neck supple.      Left lower leg: Edema present.   Skin:     Comments: Left leg and foot dressing   Neurological:      Mental Status: She is alert and oriented to person, place, and time. Mental status is at baseline.   Psychiatric:         Mood and Affect: Mood normal.         Behavior: Behavior normal.          Relevant Results  Outside Hospital Results    Labs  Results from last 72 hours   Lab Units 10/16/23  0901 10/15/23  1650   WBC AUTO x10*3/uL 12.3* 7.2   HEMOGLOBIN g/dL 9.4* 9.1*   HEMATOCRIT % 26.6* 28.0*   PLATELETS AUTO x10*3/uL 200 224     Results from last 72 hours   Lab Units 10/16/23  0901 10/15/23  1650   SODIUM mmol/L 143 140   POTASSIUM mmol/L 4.2 4.5   CHLORIDE mmol/L 106 106   CO2 mmol/L 24 24   BUN mg/dL 17 14   CREATININE mg/dL 0.90 0.80   GLUCOSE mg/dL 173* 128*   CALCIUM mg/dL 9.7 10.1   ANION GAP mmol/L 13 10   EGFR mL/min/1.73m*2 64 74     Results from last 72 hours   Lab Units 10/15/23  1650   ALK PHOS U/L 86   BILIRUBIN TOTAL mg/dL 1.8*   PROTEIN TOTAL g/dL 6.7   ALT U/L 12   AST U/L 22   ALBUMIN  "g/dL 3.1*     Estimated Creatinine Clearance: 53.9 mL/min (by C-G formula based on SCr of 0.9 mg/dL).  CRP   Date Value Ref Range Status   09/09/2023 6.3 (H) 0 - 2.0 MG/DL Final     Comment:     Performed at [address] [address]   04/24/2020 0.18 mg/dL Final     Comment:     REF VALUE  < 1.00     04/13/2020 0.34 mg/dL Final     Comment:     REF VALUE  < 1.00       Sedimentation Rate   Date Value Ref Range Status   09/09/2023 90 (H) 0 - 30 MM/HR Final     Comment:     Performed at [address] [address]   04/24/2020 23 0 - 30 mm/h Final   04/13/2020 11 0 - 30 mm/h Final     No results found for: \"HIV1X2\", \"HIVCONF\", \"EKODLQ5OO\"  No results found for: \"HEPCABINIT\", \"HEPCAB\", \"HCVPCRQUANT\"  Microbiology  No results found for the last 14 days.  10/15-wound culture pending  10/15-blood cultures pending  Imaging  XR foot left 3+ views    Result Date: 10/15/2023  Interpreted By:  Joshua Galeana, STUDY: XR FOOT LEFT 3+ VIEWS; ;  10/15/2023 5:28 pm   INDICATION: Signs/Symptoms:infection post op.   COMPARISON: Intraoperative exam from 09/14/2023   ACCESSION NUMBER(S): [id number]   ORDERING CLINICIAN: JF MULLINS   FINDINGS: There is again recent amputation through the distal 1st through 5th metatarsal bones. There are no focal lytic lesions or periosteal reaction to suggest acute osteomyelitis. Degenerative changes of tarsal joints are present, with joint space narrowing and small dorsal osteophytes. The alignment is anatomic. There is diffuse soft tissue swelling.       Diffuse soft tissue swelling status post recent 1st through 5th distal metatarsal amputation. No acute osteomyelitis.   Signed by: Joshua Galeana 10/15/2023 5:36 PM Dictation workstation:   [id number]    XR chest 1 view    Result Date: 10/15/2023  Interpreted By:  Joshua Galeana, STUDY: XR CHEST 1 VIEW; 10/15/2023 5:30 pm   INDICATION: Signs/Symptoms:dyspnea   COMPARISON: May 2023   ACCESSION " NUMBER(S): [id number]   ORDERING CLINICIAN: JF MULLINS   FINDINGS: The study is limited due to rotation and poor inspiratory effort, with resultant crowding of the pulmonary vasculature. The cardiac silhouette is borderline prominent, exaggerated by the technique. Mild bilateral perihilar interstitial infiltrates are noted. There is no pneumothorax or significant effusion. The osseous structures are unremarkable.       Allowing for the aforementioned limitation, bilateral perihilar interstitial infiltrates, most likely due to mild/early pulmonary edema; correlate clinically and follow-up as needed.   Signed by: Joshua Galeana 10/15/2023 5:34 PM Dictation workstation:   [id number]    OCT, Optic Nerve - OU - Both Eyes    Result Date: 10/11/2023  Right Eye Images reviewed and comparison made to baseline. Reliability: borderline. Left Eye Images reviewed and comparison made to baseline. Reliability: poor. Notes Thinning OD from baseline, but stable from previous year,, OS is unreliable to determine    Vascular US Lower Extremity Venous Duplex Left    Result Date: 9/19/2023  PROCEDURE:         DPL VENOUS LEG LT - WUS  [id number] REASON FOR EXAM: Swellilng of the left calf RESULT: MRN: [id number] Patient Name: [place], [place] STUDY: DPL VENOUS LEG LT; 9/19/2023 3:33 pm INDICATION: Swellilng of the left calf. COMPARISON: 09/14/2023 ACCESSION NUMBER(S): [id number] ORDERING CLINICIAN: ELIZABETH CHACON TECHNIQUE: 2D grayscale and color-flow duplex images were obtained along with Doppler spectral waveform analysis of the deep venous system of the left lower extremity from the groin to the knee. Attempts were made to image the calf veins. Venous compression and augmentation were performed. Contralateral common femoral vein also interrogated. FINDINGS: Left Lower Extremity: There is normal compressibility and flow within the visualized vessels of the deep venous system of this lower extremity. Contralateral common femoral vein was  patent. There is a 1.0 x 2.3 x 5.0 cm thick-walled cyst within the left popliteal fossa. IMPRESSION: No sign of deep venous thrombus in the left lower extremity. The left popliteal cyst is slightly smaller in size when compared with prior study. MACRO: None. Dictation workstation:   [id number] Original Interpreting Physician:   NADYA CHILDRESS M.D. Original Transcribed by/Date: MMODAL Sep 19 2023 10:38A Original Electronically Signed by/Date: NADYA CHILDRESS M.D. Sep 19 2023  3:37P Addendum Interpreting Physician: Addendum Transcribed by/Date: NO ADDENDUM Addendum Electronically Signed by/Date:      Assessment/Plan     Acute on chronic respiratory failure  Coronavirus infection  Marginal leukocytosis  Left diabetic foot ulcer-Albrecht grade 3  Possible left foot cellulitis      Dexamethasone  Remdesivir  Supplemental oxygen as needed  Local care  Glycemic control  Continue vancomycin  Continue Zosyn  Follow-up wound culture  Supportive care  Monitor temperature and WBC  Droplet plus precautions    Carlos Malone MD

## 2023-10-16 NOTE — CARE PLAN
Problem: Transfers  Goal: STG - Patient to transfer to and from sit to supine at flat bed at mod I level.  Outcome: Progressing     Problem: Transfers  Goal: STG - Patient will transfer sit to and from stand with FWW at mod I level.  Outcome: Progressing     Problem: Mobility  Goal: STG - Patient will ambulate >/= 50 ft with FWW at mod I level.  Outcome: Not Progressing  Goal: STG - Patient will ambulate up and down a curb/step with FWW and CGA x1.  Outcome: Not Progressing     Problem: Transfers  Goal: STG - Patient will perform bed mobility at mod I level.  Outcome: Not Progressing

## 2023-10-16 NOTE — H&P
History Of Present Illness  Samira López is a 82 y.o. female presenting with generalized weakness and recurrent falls.  Patient was recently in the hospital with left foot abscess, cellulitis and osteomyelitis.  Patient underwent transmetatarsal amputation of the left foot.  Patient was discharged to rehab on IV antibiotics.  Patient did well there for few days.  Patient was discharged from ID infected.  Patient in stable condition.  But since the discharge patient has been going downhill.  Patient fell twice on the day of admission.  Patient was found to have foul-smelling discharge from left foot patient.  Patient was further found to have COVID-19 infection.  It was decided to admit the patient.  Patient did not complain until the diarrhea.  Patient also complains of cough with scanty whitish-yellowish expectoration.  No hemoptysis.  No nausea or vomiting.  No diarrhea, dysuria, nocturia).  No hematemesis, hematochezia or melena..     Past Medical History  Past Medical History:   Diagnosis Date    Abnormal findings on diagnostic imaging of heart and coronary circulation 07/30/2019    Abnormal echocardiogram    Abrasion, unspecified foot, initial encounter 06/15/2020    Abrasion, foot    Body mass index (BMI) 31.0-31.9, adult 02/22/2021    BMI 31.0-31.9,adult    Body mass index (BMI) 37.0-37.9, adult     BMI 37.0-37.9, adult    Body mass index (BMI) 38.0-38.9, adult     BMI 38.0-38.9,adult    Body mass index (BMI)30.0-30.9, adult 07/25/2022    BMI 30.0-30.9,adult    Body mass index (BMI)30.0-30.9, adult 06/16/2021    BMI 30.0-30.9,adult    Body mass index (BMI)30.0-30.9, adult 02/28/2022    BMI 30.0-30.9,adult    Body mass index (BMI)30.0-30.9, adult 06/02/2022    BMI 30.0-30.9,adult    Body mass index (BMI)30.0-30.9, adult 04/04/2022    BMI 30.0-30.9,adult    Cellulitis of left lower limb 09/20/2022    Cellulitis of foot, left    Combined forms of age-related cataract, bilateral 07/27/2022    Mixed type  age-related cataract, both eyes    Dyskinesia of esophagus 12/05/2019    Esophageal spasm    Elevated blood-pressure reading, without diagnosis of hypertension 11/03/2015    Elevated blood pressure reading without diagnosis of hypertension    Epistaxis 11/25/2019    Mild epistaxis    Glaucoma     Hypoxemia 08/17/2020    Hypoxia    Idiopathic sleep related nonobstructive alveolar hypoventilation 08/17/2020    Oxygen desaturation during sleep    Idiopathic sleep related nonobstructive alveolar hypoventilation 08/17/2020    Nocturnal hypoxia    Local infection of the skin and subcutaneous tissue, unspecified 04/13/2020    Foot infection    Nipple discharge 06/02/2017    Nipple discharge in female    Non-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 foot with unspecified severity (CMS/Formerly [facility]) 07/12/2022    Foot ulcer, left    Non-pressure chronic ulcer of unspecified part of left lower leg with unspecified severity (CMS/[facility]ly [facility]) 06/16/2021    Leg ulcer, left    Other conditions influencing health status 05/19/2015    History of cough    Other disorders of electrolyte and fluid balance, not elsewhere classified 06/26/2019    Electrolyte and fluid disorder    Other pulmonary embolism with acute cor pulmonale (CMS/[facility]ly [facility]) 06/16/2021    Pulmonary embolism with acute cor pulmonale, unspecified chronicity, unspecified pulmonary embolism type    Other specified personal risk factors, not elsewhere classified 07/31/2019    At risk for complication    Other specified postprocedural states 06/29/2017    History of right breast biopsy    Other symptoms and signs involving the nervous system 08/17/2020    Suspected sleep apnea    Pain in unspecified foot 04/29/2020    Ischemic foot pain at rest    Pain in unspecified hand 07/30/2013    Pain in hand    Pain in unspecified hand 06/22/2020    Pain in hand    Pain in unspecified hip 06/12/2013    Joint pain, hip    Pain in unspecified hip 06/22/2020    Joint pain, hip    Pain, unspecified  02/28/2022    Pain    Personal history of diseases of the skin and subcutaneous tissue 02/12/2020    History of dermatitis    Personal history of other diseases of the circulatory system 06/16/2021    History of pulmonary hypertension    Personal history of other diseases of the musculoskeletal system and connective tissue 12/17/2020    History of low back pain    Personal history of other diseases of the musculoskeletal system and connective tissue 12/04/2020    History of fibromyalgia    Personal history of other diseases of the nervous system and sense organs 08/17/2020    History of sciatica    Personal history of other diseases of the respiratory system 05/19/2015    History of laryngitis    Personal history of other diseases of the respiratory system 12/23/2017    History of sinusitis    Personal history of other specified conditions 06/29/2017    History of abnormal mammogram    Personal history of other specified conditions 07/05/2018    History of urinary incontinence    Personal history of other specified conditions 06/29/2017    History of breast lump    Personal history of other specified conditions 07/31/2019    History of shortness of breath    Personal history of other specified conditions 06/22/2020    History of edema    Personal history of pulmonary embolism 04/06/2022    History of pulmonary embolism    Preglaucoma, unspecified, unspecified eye     Glaucoma suspect    Radiculopathy, lumbar region 02/23/2022    Lumbar radicular pain    Shortness of breath 08/08/2023    Sleep apnea, unspecified 02/12/2020    Sleep disorder breathing    Spondylosis without myelopathy or radiculopathy, lumbar region 02/23/2022    Lumbar spondylosis    Unspecified cataract 06/17/2020    Cataract of left eye    Unspecified cataract     Cataract of right eye    Unspecified cataract     Cataract of left eye       Surgical History  Past Surgical History:   Procedure Laterality Date    CHOLECYSTECTOMY  06/12/2013     Cholecystectomy Laparoscopic    CT AORTA AND BILATERAL ILIOFEMORAL RUNOFF ANGIOGRAM W AND/OR WO IV CONTRAST  4/25/2020    CT AORTA AND BILATERAL ILIOFEMORAL RUNOFF ANGIOGRAM W AND/OR WO IV CONTRAST 4/25/2020 [facility] CLINICAL LEGACY    CT AORTA AND BILATERAL ILIOFEMORAL RUNOFF ANGIOGRAM W AND/OR WO IV CONTRAST  9/8/2022    CT AORTA AND BILATERAL ILIOFEMORAL RUNOFF ANGIOGRAM W AND/OR WO IV CONTRAST [facility] INPATIENT LEGACY    CT AORTA AND BILATERAL ILIOFEMORAL RUNOFF ANGIOGRAM W AND/OR WO IV CONTRAST  5/17/2023    CT AORTA AND BILATERAL ILIOFEMORAL RUNOFF ANGIOGRAM W AND/OR WO IV CONTRAST [facility] INPATIENT LEGACY    CT GUIDED PERCUTANEOUS BIOPSY BONE DEEP  12/13/2018    CT GUIDED PERCUTANEOUS BIOPSY BONE DEEP 12/13/2018 [facility] AIB LEGACY    FOOT SURGERY  06/29/2017    Foot Surgery    OTHER SURGICAL HISTORY  06/17/2020    Cataract surgery        Social History  She reports that she quit smoking about 23 years ago. Her smoking use included cigarettes. She has a 22.50 pack-year smoking history. She has never used smokeless tobacco. She reports current alcohol use. She reports that she does not currently use drugs.    Family History  Family History   Problem Relation Name Age of Onset    Breast cancer Mother      Coronary artery disease Father          Allergies  Cephalosporins, Ciprofloxacin, Codeine, and Epoetin shankar    Review of Systems  I reviewed all systems reviewed as above otherwise is negative.  Physical Exam  HEENT:  Head externally atraumatic, no pallor, no icterus, extraocular movements intact, pupils reactive to light, oral mucosa moist and throat clear.  Neck:  Supple, no JVP, no palpable adenopathy or thyromegaly.  No carotid bruit.  Chest:  Clear to auscultation and resonant.  Heart:  Regular rate and rhythm, no murmur or gallop could be appreciated.  Abdomen:  Soft, nontender, bowel sounds present, normoactive, no palpable hepatosplenomegaly.  Extremities: Patient with mild edema of the left lower extremity.  Patient  "also also has a transmetatarsal amputation of the foot with some foul-smelling discharge from the foot.  Dissection is.  CNS:  Patient alert, oriented to time, place and person.  Power 5/5 all over and deep tendon reflexes symmetrical, cranial nerves 2-12 grossly intact.  Skin:  No active rash.  Musculoskeletal:  No joint swelling or erythema, range of movement normal.  Last Recorded Vitals  Heart Rate:  [74-91]   Temp:  [36.4 °C (97.5 °F)-36.7 °C (98.1 °F)]   Resp:  [16-23]   BP: (129-143)/(50-59)   Height:  [177.8 cm (5' 10\")-179.1 cm (5' 10.5\")]   Weight:  [79.9 kg (176 lb 2.4 oz)-87.3 kg (192 lb 7.4 oz)]   SpO2:  [91 %-96 %]       Relevant Results        Results for orders placed or performed during the hospital encounter of 10/15/23 (from the past 24 hour(s))   CBC   Result Value Ref Range    WBC 7.2 4.4 - 11.3 x10*3/uL    nRBC 0.0 0.0 - 0.0 /100 WBCs    RBC 2.77 (L) 4.00 - 5.20 x10*6/uL    Hemoglobin 9.1 (L) 12.0 - 16.0 g/dL    Hematocrit 28.0 (L) 36.0 - 46.0 %     (H) 80 - 100 fL    MCH 32.9 26.0 - 34.0 pg    MCHC 32.5 32.0 - 36.0 g/dL    RDW 18.6 (H) 11.5 - 14.5 %    Platelets 224 150 - 450 x10*3/uL    MPV 10.8 7.5 - 11.5 fL   Comprehensive Metabolic Panel   Result Value Ref Range    Glucose 128 (H) 65 - 99 mg/dL    Sodium 140 133 - 145 mmol/L    Potassium 4.5 3.4 - 5.1 mmol/L    Chloride 106 97 - 107 mmol/L    Bicarbonate 24 24 - 31 mmol/L    Urea Nitrogen 14 8 - 25 mg/dL    Creatinine 0.80 0.40 - 1.60 mg/dL    eGFR 74 >60 mL/min/1.73m*2    Calcium 10.1 8.5 - 10.4 mg/dL    Albumin 3.1 (L) 3.5 - 5.0 g/dL    Alkaline Phosphatase 86 35 - 125 U/L    Total Protein 6.7 5.9 - 7.9 g/dL    AST 22 5 - 40 U/L    Bilirubin, Total 1.8 (H) 0.1 - 1.2 mg/dL    ALT 12 5 - 40 U/L    Anion Gap 10 <=19 mmol/L   Blood Gas Lactic Acid, Venous   Result Value Ref Range    POCT Lactate, Venous 1.9 0.4 - 2.0 mmol/L   NT-PROBNP   Result Value Ref Range    PROBNP 1,094 (H) 0 - 624 pg/mL   Sars-CoV-2 PCR, Symptomatic   Result " Value Ref Range    Coronavirus 2019, PCR Detected (A) Not Detected   Urinalysis with Reflex Microscopic   Result Value Ref Range    Color, Urine Light-Yellow Light-Yellow, Yellow, Dark-Yellow    Appearance, Urine Clear Clear    Specific Gravity, Urine 1.011 1.005 - 1.035    pH, Urine 7.0 5.0, 5.5, 6.0, 6.5, 7.0, 7.5, 8.0    Protein, Urine NEGATIVE NEGATIVE, 10 (TRACE), 20 (TRACE) mg/dL    Glucose, Urine Normal Normal mg/dL    Blood, Urine NEGATIVE NEGATIVE    Ketones, Urine NEGATIVE NEGATIVE mg/dL    Bilirubin, Urine NEGATIVE NEGATIVE    Urobilinogen, Urine Normal Normal mg/dL    Nitrite, Urine NEGATIVE NEGATIVE    Leukocyte Esterase, Urine 25 Jacques/µL (A) NEGATIVE   Urinalysis Microscopic Only   Result Value Ref Range    WBC, Urine 1-5 1-5, NONE /HPF    RBC, Urine 1-2 NONE, 1-2, 3-5 /HPF    Squamous Epithelial Cells, Urine 1-9 (SPARSE) Reference range not established. /HPF    Mucus, Urine 1+ Reference range not established. /LPF    Hyaline Casts, Urine OCCASIONAL (A) NONE /LPF     Prior to Admission medications    Medication Sig Start Date End Date Taking? Authorizing Provider   acetaminophen (Tylenol) 325 mg tablet Take 1 tablet (325 mg) by mouth every 6 hours if needed for mild pain (1 - 3).    Historical Provider, MD   aflibercept (Eylea) 2 mg/0.05 mL intra-ocular injection Inject into the eye. Take as direted    Historical Provider, MD   amLODIPine (Norvasc) 5 mg tablet Take 1 tablet (5 mg) by mouth once daily. 9/13/22   Historical Provider, MD   amoxicillin (Amoxil) 500 mg capsule Take 1 capsule (500 mg) by mouth 2 times a day. 1/9/23   Historical Provider, MD   ascorbic acid (Vitamin C) 500 mg tablet Take 1 tablet (500 mg) by mouth once daily.    Historical Provider, MD   aspirin (Aspirin Childrens) 81 mg chewable tablet Chew 1 tablet (81 mg) once daily. 4/10/22   Historical Provider, MD   biotin 1 mg tablet Take by mouth once daily.    Historical Provider, MD   calcium citrate 250 mg calcium tablet Take 2  tablets (500 mg) by mouth once daily. 6/1/20   Historical Provider, MD   cholecalciferol (Vitamin D-3) 10 mcg (400 unit) tablet,chewable Chew 2 tablets (20 mcg) once daily. 8/9/23 8/8/24  Shannan Ceballos MD   cholecalciferol (Vitamin D3) 50 mcg (2,000 unit) capsule Take 1 capsule (50 mcg) by mouth early in the morning.. 4/10/22   Historical Provider, MD   ciprofloxacin (Cipro) 500 mg tablet Take 1 tablet (500 mg) by mouth every 12 hours. 3/31/23   Historical Provider, MD   clobetasol (Temovate) 0.05 % ointment Apply 1 Application topically once daily.    Historical Provider, MD   clopidogrel (Plavix) 75 mg tablet Take 1 tablet (75 mg) by mouth once daily. 9/13/22   Historical Provider, MD   diphenhydrAMINE (Sominex) 25 mg tablet Take 1 tablet (25 mg) by mouth every 6 hours if needed for itching.    Historical Provider, MD   dorzolamide (Trusopt) 2 % ophthalmic solution Administer 1 drop into both eyes 2 times a day. 10/11/23 10/10/24  Clare Liu MD   Eliquis 5 mg tablet Take 0.5 tablets (2.5 mg) by mouth 2 times a day.    Historical Provider, MD   esomeprazole (NexIUM 24HR) 20 mg DR capsule Take 1 capsule (20 mg) by mouth once daily. 4/10/22   Historical Provider, MD   exemestane (Aromasin) 25 mg tablet Take 1 tablet (25 mg total) by mouth once daily.  Take after a meal.  Try to take at the same time each day. 10/4/23 1/2/24  Emmanuel Prajapati MD   ferrous gluconate (Ferate) 240 (27 Fe) MG tablet Take 1 tablet (27 mg) by mouth 2 times a day. 4/1/22   Historical Provider, MD   furosemide (Lasix) 20 mg tablet Take 2 tablets (40 mg) by mouth once daily. 4/4/22   Historical Provider, MD   HySept 0.25 % external solution APPLY AND CLEAN WOUND BED 5/5/23   Historical Provider, MD   Ibrance 100 mg tablet 1 tablet (100 mg). 12/26/22   Historical Provider, MD   ketorolac (Acular) 0.5 % ophthalmic solution instill 1 drop into the right eye four times every day as needed 10/18/22   Historical Provider, MD   latanoprost  (Xalatan) 0.005 % ophthalmic solution INSTILL  1 DROP INTO BOTH EYES EVERY DAY AT BEDTIME 10/11/23   Clare Liu MD   lisinopril 20 mg tablet Take 1 tablet (20 mg) by mouth once daily. 5/30/23   Historical Provider, MD   loperamide HCl (IRINA ORAL) Administer into the right eye once daily.    Historical Provider, MD   metoprolol succinate XL (Toprol-XL) 50 mg 24 hr tablet Take 1 tablet (50 mg) by mouth once daily.    Historical Provider, MD   omega 3-dha-epa-fish oil (Fish OiL) 1,200 (144-216) mg capsule Take by mouth. as directed Orally    Historical Provider, MD   oxybutynin XL (Ditropan-XL) 10 mg 24 hr tablet Take 1 tablet (10 mg) by mouth once daily. 4/23/23   Historical Provider, MD   oxybutynin XL (Ditropan-XL) 5 mg 24 hr tablet Take 1 tablet (5 mg) by mouth once daily.    Historical Provider, MD   oxygen (O2) gas therapy Administer 4 L into affected nostril(s) once daily at bedtime.    Historical Provider, MD   palbociclib (IBRANCE ORAL) Take 1 capsule by mouth once daily. 125mg oral capsule, for 21 days off 7    Historical Provider, MD   palbociclib (Ibrance) 100 mg tablet TAKE ONE (1) TABLET BY MOUTH ONCE A DAY ON DAYS 1-21 OF A 28-DAY CYCLE 2/8/23 2/7/24  MALIK Forrester-CNP   pantoprazole (ProtoNix) 40 mg EC tablet Take by mouth once daily. 9/11/22   Historical Provider, MD   rosuvastatin (Crestor) 20 mg tablet Take 1 tablet (20 mg) by mouth once daily. 8/9/23   Shannan Ceballos MD   tiZANidine (Zanaflex) 2 mg capsule Take 1 capsule (2 mg) by mouth every 6 hours if needed (mild pain).    Historical Provider, MD   trospium (Sanctura XR) 60 mg 24 hour capsule Take 1 capsule (60 mg) by mouth once daily. on an empty stomach every morning Orally Once a day for 30 day(s)    Historical Provider, MD   brimonidine (AlphaGAN P) 0.2 % ophthalmic solution every 12 hours. 2/24/22 10/11/23  Historical Provider, MD   dorzolamide (Trusopt) 2 % ophthalmic solution Administer 1 drop into both eyes 2 times a day.  7/21/23 10/11/23  Historical Provider, MD   latanoprost (Xalatan) 0.005 % ophthalmic solution INSTILL  1 DROP INTO BOTH EYES EVERY DAY AT BEDTIME  10/11/23  Historical Provider, MD       Current Facility-Administered Medications:     acetaminophen (Tylenol) tablet 650 mg, 650 mg, oral, q4h PRN **OR** acetaminophen (Tylenol) oral liquid 650 mg, 650 mg, oral, q4h PRN, 650 mg at 10/15/23 2333 **OR** acetaminophen (Tylenol) suppository 650 mg, 650 mg, rectal, q4h PRN, Jaquan Ríos MD    acetaminophen (Tylenol) tablet 325 mg, 325 mg, oral, q6h PRN, Jaquan Ríos MD    amLODIPine (Norvasc) tablet 5 mg, 5 mg, oral, Daily, Jaquan Ríos MD    ascorbic acid (Vitamin C) tablet 500 mg, 500 mg, oral, Daily, Jaquan Ríos MD    aspirin chewable tablet 81 mg, 81 mg, oral, Daily, Jaquan Ríos MD    benzocaine-menthol (Cepastat Sore Throat) 15-3.6 mg lozenge 1 lozenge, 1 lozenge, Mouth/Throat, q2h PRN, Jaquan Ríos MD    biotin tablet 1,000 mcg, 1,000 mcg, oral, Daily, Jaquan Ríos MD    calcium citrate tablet 500 mg, 2 tablet, oral, Daily, Jaquan Ríos MD    cholecalciferol (Vitamin D-3) capsule 50 mcg, 50 mcg, oral, Daily, Jaquan Ríos MD    cholecalciferol (Vitamin D-3) tablet 125 mcg, 125 mcg, oral, Daily, Jaquan Ríos MD    clobetasol (Temovate) 0.05 % ointment 1 Application, 1 Application, Topical, Daily, Jaquan Ríos MD    dexAMETHasone (Decadron) tablet 6 mg, 6 mg, oral, BID after meals, Jaquan Ríos MD    dextromethorphan-guaifenesin (Robitussin DM)  mg/5 mL oral liquid 5 mL, 5 mL, oral, q4h PRN, Jaquan Ríos MD    diphenhydrAMINE (Sominex) tablet 25 mg, 25 mg, oral, q6h PRN, Jaquan Ríos MD    dorzolamide (Trusopt) 2 % ophthalmic solution 1 drop, 1 drop, Both Eyes, BID, Jaquan Ríos MD    ferrous gluconate (Fergon) tablet 27 mg, 27 mg, oral, BID, Jaquan Ríos MD    furosemide (Lasix) tablet 40 mg, 40 mg,  oral, Daily, Jaquan Ríos MD    guaiFENesin (Mucinex) 12 hr tablet 600 mg, 600 mg, oral, q12h PRN, Jaquan Ríos MD    ipratropium-albuteroL (Duo-Neb) 0.5-2.5 mg/3 mL nebulizer solution 3 mL, 3 mL, nebulization, q6h, Jaquan Ríos MD    latanoprost (Xalatan) 0.005 % ophthalmic solution 1 drop, 1 drop, Both Eyes, Daily, Jaquan Ríos MD    lisinopril tablet 20 mg, 20 mg, oral, Daily, Jaquan Ríos MD    metoprolol succinate XL (Toprol-XL) 24 hr tablet 50 mg, 50 mg, oral, Daily, Jaquan Ríos MD    omega 2-gqy-pyq-fish oil 1,200 (144-216) mg capsule 3,600 mg, 3 capsule, oral, Daily, Jaquan Ríos MD    oxybutynin (Ditropan) tablet 5 mg, 5 mg, oral, BID, Jaquan Ríos MD    oxygen (O2) therapy, , inhalation, Nightly, Jaquan Ríos MD    pantoprazole (ProtoNix) EC tablet 40 mg, 40 mg, oral, Daily, Jaquan Ríos MD    pantoprazole (ProtoNix) EC tablet 40 mg, 40 mg, oral, Daily before breakfast, Jaquan Ríos MD    piperacillin-tazobactam-dextrose (Zosyn) IV 4.5 g, 4.5 g, intravenous, q8h, Jaquan Ríos MD    polyethylene glycol (Glycolax, Miralax) packet 17 g, 17 g, oral, Daily PRN, Jaquan Ríos MD    [COMPLETED] remdesivir (Veklury) 200 mg in sodium chloride 0.9% 250 mL IV, 200 mg, intravenous, Once, Last Rate: 250 mL/hr at 10/15/23 2339, 200 mg at 10/15/23 2339 **FOLLOWED BY** remdesivir (Veklury) 100 mg in sodium chloride 0.9% 250 mL IV, 100 mg, intravenous, q24h, Jaquan Ríos MD    rosuvastatin (Crestor) tablet 20 mg, 20 mg, oral, Daily, Jaquan Ríos MD    sodium hypochlorite (Dakin's HALF-Strength) 0.25 % external solution, , irrigation, Daily, Jaquan Ríos MD    tiZANidine (Zanaflex) tablet 2 mg, 2 mg, oral, TID, Jaquan Ríos MD    vancomycin-diluent combo no.1 (Xellia) IVPB 750 mg, 750 mg, intravenous, q12h, Jaquan Ríos MD  XR foot left 3+ views    Result Date: 10/15/2023  Interpreted By:  Kervin  Joshua, STUDY: XR FOOT LEFT 3+ VIEWS; ;  10/15/2023 5:28 pm   INDICATION: Signs/Symptoms:infection post op.   COMPARISON: Intraoperative exam from 09/14/2023   ACCESSION NUMBER(S): [id number]   ORDERING CLINICIAN: JF MULLINS   FINDINGS: There is again recent amputation through the distal 1st through 5th metatarsal bones. There are no focal lytic lesions or periosteal reaction to suggest acute osteomyelitis. Degenerative changes of tarsal joints are present, with joint space narrowing and small dorsal osteophytes. The alignment is anatomic. There is diffuse soft tissue swelling.       Diffuse soft tissue swelling status post recent 1st through 5th distal metatarsal amputation. No acute osteomyelitis.   Signed by: Joshua Galeana 10/15/2023 5:36 PM Dictation workstation:   [id number]    XR chest 1 view    Result Date: 10/15/2023  Interpreted By:  Joshua Galeana, STUDY: XR CHEST 1 VIEW; 10/15/2023 5:30 pm   INDICATION: Signs/Symptoms:dyspnea   COMPARISON: May 2023   ACCESSION NUMBER(S): [id number]   ORDERING CLINICIAN: JF MULLINS   FINDINGS: The study is limited due to rotation and poor inspiratory effort, with resultant crowding of the pulmonary vasculature. The cardiac silhouette is borderline prominent, exaggerated by the technique. Mild bilateral perihilar interstitial infiltrates are noted. There is no pneumothorax or significant effusion. The osseous structures are unremarkable.       Allowing for the aforementioned limitation, bilateral perihilar interstitial infiltrates, most likely due to mild/early pulmonary edema; correlate clinically and follow-up as needed.   Signed by: Joshua Galeana 10/15/2023 5:34 PM Dictation workstation:   [id number]    OCT, Optic Nerve - OU - Both Eyes    Result Date: 10/11/2023  Right Eye Images reviewed and comparison made to baseline. Reliability: borderline. Left Eye Images reviewed and comparison made to baseline. Reliability: poor. Notes Thinning OD from baseline,  but stable from previous year,, OS is unreliable to determine    Vascular US Lower Extremity Venous Duplex Left    Result Date: 9/19/2023  PROCEDURE:         DPL VENOUS LEG LT - WUS  2594 REASON FOR EXAM: Swellilng of the left calf RESULT: MRN: [id number] Patient Name: FELICITA CANTU STUDY: DPL VENOUS LEG LT; 9/19/2023 3:33 pm INDICATION: Swellilng of the left calf. COMPARISON: 09/14/2023 ACCESSION NUMBER(S): [id number] ORDERING CLINICIAN: ELIZABETH CHACON TECHNIQUE: 2D grayscale and color-flow duplex images were obtained along with Doppler spectral waveform analysis of the deep venous system of the left lower extremity from the groin to the knee. Attempts were made to image the calf veins. Venous compression and augmentation were performed. Contralateral common femoral vein also interrogated. FINDINGS: Left Lower Extremity: There is normal compressibility and flow within the visualized vessels of the deep venous system of this lower extremity. Contralateral common femoral vein was patent. There is a 1.0 x 2.3 x 5.0 cm thick-walled cyst within the left popliteal fossa. IMPRESSION: No sign of deep venous thrombus in the left lower extremity. The left popliteal cyst is slightly smaller in size when compared with prior study. MACRO: None. Dictation workstation:   [id number] Original Interpreting Physician:   NADYA CHILDRESS M.D. Original Transcribed by/Date: MMODAL Sep 19 2023 10:38A Original Electronically Signed by/Date: NADYA CHILDRESS M.D. Sep 19 2023  3:37P Addendum Interpreting Physician: Addendum Transcribed by/Date: NO ADDENDUM Addendum Electronically Signed by/Date:     No results found for the last 90 days.       Assessment/Plan   Principal Problem:    Acute respiratory failure (CMS/HCC)  Active Problems:    Arthritis, multiple joint involvement    Balance disorder    Lumbar canal stenosis    Chronic diastolic congestive heart failure (CMS/HCC)    Dermatitis    Obstructive sleep apnea, adult    Peripheral polyneuropathy     Anemia due to chronic kidney disease    Cellulitis and abscess of lower extremity    Cellulitis of left lower limb    Cutaneous abscess of left lower limb    Malignant neoplasm of unspecified site of unspecified female breast (CMS/HCC)    NATALIYA (obstructive sleep apnea)    Personal history of nicotine dependence    Primary generalized (osteo)arthritis    Abnormal computed tomography scan    Accidental fall    Anxiety    Asthenia    Chronic respiratory failure with hypoxia (CMS/HCC)    Diabetes mellitus type 2 in obese (CMS/HCC)    Gangrene of foot (CMS/[facility])    Dyspnea  COVID-19    Continue current medication.  Patient has been started on broad-spectrum antibiotics.  Check ESR and CRP.  Consult ID.  Consult podiatry.  Patient has been started on the remdesivir.  Continue aerosol treatments acute respiratory failure.  Give patient dexamethasone.  Monitor blood sugar and cover with sliding scale insulin.  Monitor blood pressure..  We will take DVT, fall, aspiration decubitus precautions.  This has been discussed with the patient and he is agreeable to it.      Jaquan Ríos MD

## 2023-10-16 NOTE — PROGRESS NOTES
Physical Therapy    Physical Therapy Treatment    Patient Name: Samira López  MRN: [id number]  Today's Date: 10/16/2023  Time Calculation  Start Time: 1012  Stop Time: 1040  Time Calculation (min): 28 min       Assessment/Plan   PT Assessment: Pt presented with decreased muscular strength/endurance and increased assist required for functional mobility. Pt would benefit from continued skilled PT services prior to and after discharge to increase independence with mobility.  PT Assessment Results: Decreased strength, Decreased endurance, Impaired balance, Decreased mobility, Impaired hearing  Rehab Prognosis: Good  Evaluation/Treatment Tolerance: Patient tolerated treatment well  Strengths: Rehab experience, Housing layout  End of Session Communication: PCT/NA/CTA  End of Session Patient Position: Up in chair (PCT in room)     PT Plan  Treatment/Interventions: Bed mobility, Transfer training, Gait training, Stair training, Balance training, Strengthening, Endurance training, Therapeutic exercise, Therapeutic activity  PT Plan: Skilled PT  PT Frequency: 4 times per week  PT Discharge Recommendations: Moderate intensity level of continued care  PT Recommended Transfer Status: Assist x1 (CGA to Nikita x1 with FWW)      General Visit Information:   PT  Visit  PT Received On: 10/16/23  General  Reason for Referral: Impaired functional mobility. Pt admitted for COVID-19 and acute respiratory failure. Of note, pt recently underwent L TMA on 9/14/23 and remained heel WBing in forefoot offloading shoe.  Past Medical History Relevant to Rehab: anxiety, DM-II, chronic respiratory failure with hypoxia on home O2 (3 L/min), COPD, DVT, PE, R breast cancer with mets, urinary incontinence, pulmonary HTN, sciatica, back pain/lumbar radicular pain,  Prior to Session Communication: Bedside nurse  Patient Position Received: Bed, 2 rail up  General Comment: RN cleared pt for participation. Pt agreed to eval/tx and participated  fully.    Subjective   Precautions:  Precautions  Hearing/Visual Limitations: reading glasses; mildly [place]  LE Weight Bearing Status: Heel Weight Bearing in Post-Op Shoe (LLE in forefoot off loading shoe)  Medical Precautions: Fall precautions & infection precautions (COVID-19).    Objective   Pain:  Pain Assessment  Pain Assessment: 0-10  Pain Score: 3  Pain Location: Foot  Pain Orientation: Left  Postural Control:  Postural Control  Postural Control: Within Functional Limits  Posture Comment: fwd head posture with protracted shoulders  Extremity/Trunk Assessments:  RLE   RLE : Within Functional Limits  LLE   LLE : Within Functional Limits  Activity Tolerance:  Activity Tolerance  Endurance: Tolerates 10 - 20 min exercise with multiple rests  Early Mobility/Exercise Safety Screen: Proceed with mobilization - No exclusion criteria met  Treatments:  Therapeutic Exercise  Therapeutic Exercise Performed: Yes (supine, x10 reps)  Therapeutic Exercise Activity 1: R AP  Therapeutic Exercise Activity 2: Bilat QS    Bed Mobility  Bed Mobility: Yes  Bed Mobility 1  Bed Mobility 1: Supine to sitting  Level of Assistance 1: Distant supervision  Bed Mobility Comments 1: HOB elevated and used bed rail    Transfers  Transfer: Yes  Transfer 1  Technique 1: Sit to stand  Transfer Device 1: Walker  Transfer Level of Assistance 1: Contact guard (from elevated EOB. Pt attempted from unelevated EOB but was unable to complete (1 attempt only) but requested no assist.)  Transfers 2  Technique 2: Stand to sit  Transfer Device 2: Walker  Transfer Level of Assistance 2: Minimum assistance  Trials/Comments 2: at bedside chair with assist for increased control of descent  Transfers 3  Transfer From 3:  (EOB>bedside chair via stand-pivot toward L side)  Transfer Level of Assistance 3: Contact guard    Outcome Measures:  [facility] Basic Mobility  Turning from your back to your side while in a flat bed without using bedrails: A little  Moving from  lying on your back to sitting on the side of a flat bed without using bedrails: A lot  Moving to and from bed to chair (including a wheelchair): A lot  Standing up from a chair using your arms (e.g. wheelchair or bedside chair): None  To walk in hospital room: A little  Climbing 3-5 steps with railing: A lot  Basic Mobility - Total Score: 16    Education Documentation  Precautions, taught by Valeria Lees PT at 10/16/2023 12:18 PM.  Learner: Patient  Readiness: Eager  Method: Explanation  Response: Needs Reinforcement, Verbalizes Understanding    Body Mechanics, taught by Valeria Lees PT at 10/16/2023 12:18 PM.  Learner: Patient  Readiness: Eager  Method: Explanation  Response: Needs Reinforcement, Verbalizes Understanding    Mobility Training, taught by Valeria Lees PT at 10/16/2023 12:18 PM.  Learner: Patient  Readiness: Eager  Method: Explanation  Response: Needs Reinforcement, Verbalizes Understanding    Education Comments  No comments found.        OP EDUCATION:       Encounter Problems       Encounter Problems (Active)       Mobility       STG - Patient will ambulate >/= 50 ft with FWW at mod I level. (Not Progressing)       Start:  10/16/23    Expected End:  10/31/23            STG - Patient will ambulate up and down a curb/step with FWW and CGA x1. (Not Progressing)       Start:  10/16/23    Expected End:  10/31/23               Transfers       STG - Patient to transfer to and from sit to supine at flat bed at mod I level. (Progressing)       Start:  10/16/23    Expected End:  10/31/23            STG - Patient will perform bed mobility at mod I level. (Not Progressing)       Start:  10/16/23    Expected End:  10/31/23            STG - Patient will transfer sit to and from stand with FWW at mod I level. (Progressing)       Start:  10/16/23    Expected End:  10/31/23

## 2023-10-17 LAB
ANION GAP SERPL CALC-SCNC: 10 MMOL/L
BUN SERPL-MCNC: 21 MG/DL (ref 8–25)
CALCIUM SERPL-MCNC: 9.4 MG/DL (ref 8.5–10.4)
CHLORIDE SERPL-SCNC: 104 MMOL/L (ref 97–107)
CO2 SERPL-SCNC: 25 MMOL/L (ref 24–31)
CREAT SERPL-MCNC: 0.8 MG/DL (ref 0.4–1.6)
ERYTHROCYTE [DISTWIDTH] IN BLOOD BY AUTOMATED COUNT: 17.2 % (ref 11.5–14.5)
GFR SERPL CREATININE-BSD FRML MDRD: 74 ML/MIN/1.73M*2
GLUCOSE BLD MANUAL STRIP-MCNC: 172 MG/DL (ref 74–99)
GLUCOSE BLD MANUAL STRIP-MCNC: 180 MG/DL (ref 74–99)
GLUCOSE BLD MANUAL STRIP-MCNC: 193 MG/DL (ref 74–99)
GLUCOSE BLD MANUAL STRIP-MCNC: 225 MG/DL (ref 74–99)
GLUCOSE SERPL-MCNC: 185 MG/DL (ref 65–99)
HCT VFR BLD AUTO: 29.6 % (ref 36–46)
HGB BLD-MCNC: 9 G/DL (ref 12–16)
MCH RBC QN AUTO: 29.4 PG (ref 26–34)
MCHC RBC AUTO-ENTMCNC: 30.4 G/DL (ref 32–36)
MCV RBC AUTO: 97 FL (ref 80–100)
NRBC BLD-RTO: 0 /100 WBCS (ref 0–0)
PLATELET # BLD AUTO: 203 X10*3/UL (ref 150–450)
PMV BLD AUTO: 11.2 FL (ref 7.5–11.5)
POTASSIUM SERPL-SCNC: 4.1 MMOL/L (ref 3.4–5.1)
RBC # BLD AUTO: 3.06 X10*6/UL (ref 4–5.2)
SODIUM SERPL-SCNC: 139 MMOL/L (ref 133–145)
VANCOMYCIN SERPL-MCNC: 18.4 UG/ML (ref 10–20)
WBC # BLD AUTO: 10.1 X10*3/UL (ref 4.4–11.3)

## 2023-10-17 PROCEDURE — 93010 ELECTROCARDIOGRAM REPORT: CPT | Performed by: INTERNAL MEDICINE

## 2023-10-17 PROCEDURE — 80202 ASSAY OF VANCOMYCIN: CPT | Performed by: INTERNAL MEDICINE

## 2023-10-17 PROCEDURE — 1200000002 HC GENERAL ROOM WITH TELEMETRY DAILY

## 2023-10-17 PROCEDURE — 2500000004 HC RX 250 GENERAL PHARMACY W/ HCPCS (ALT 636 FOR OP/ED): Performed by: INTERNAL MEDICINE

## 2023-10-17 PROCEDURE — 80048 BASIC METABOLIC PNL TOTAL CA: CPT | Performed by: NURSE PRACTITIONER

## 2023-10-17 PROCEDURE — 2500000001 HC RX 250 WO HCPCS SELF ADMINISTERED DRUGS (ALT 637 FOR MEDICARE OP): Performed by: INTERNAL MEDICINE

## 2023-10-17 PROCEDURE — 85027 COMPLETE CBC AUTOMATED: CPT | Performed by: NURSE PRACTITIONER

## 2023-10-17 PROCEDURE — 9420000001 HC RT PATIENT EDUCATION 5 MIN

## 2023-10-17 PROCEDURE — 2500000004 HC RX 250 GENERAL PHARMACY W/ HCPCS (ALT 636 FOR OP/ED): Performed by: NURSE PRACTITIONER

## 2023-10-17 PROCEDURE — 96372 THER/PROPH/DIAG INJ SC/IM: CPT | Performed by: INTERNAL MEDICINE

## 2023-10-17 PROCEDURE — 97535 SELF CARE MNGMENT TRAINING: CPT | Mod: GO

## 2023-10-17 PROCEDURE — 94640 AIRWAY INHALATION TREATMENT: CPT

## 2023-10-17 PROCEDURE — 2500000002 HC RX 250 W HCPCS SELF ADMINISTERED DRUGS (ALT 637 FOR MEDICARE OP, ALT 636 FOR OP/ED): Performed by: INTERNAL MEDICINE

## 2023-10-17 PROCEDURE — 82947 ASSAY GLUCOSE BLOOD QUANT: CPT

## 2023-10-17 RX ORDER — POLYETHYLENE GLYCOL 3350 17 G/17G
17 POWDER, FOR SOLUTION ORAL DAILY
Status: DISCONTINUED | OUTPATIENT
Start: 2023-10-17 | End: 2023-10-25 | Stop reason: HOSPADM

## 2023-10-17 RX ORDER — TIZANIDINE 2 MG/1
2 TABLET ORAL EVERY 6 HOURS PRN
Status: DISCONTINUED | OUTPATIENT
Start: 2023-10-17 | End: 2023-10-25 | Stop reason: HOSPADM

## 2023-10-17 RX ORDER — VANCOMYCIN 1 G/200ML
1 INJECTION, SOLUTION INTRAVENOUS EVERY 24 HOURS
Status: DISCONTINUED | OUTPATIENT
Start: 2023-10-18 | End: 2023-10-18

## 2023-10-17 RX ADMIN — PANTOPRAZOLE SODIUM 40 MG: 40 TABLET, DELAYED RELEASE ORAL at 05:57

## 2023-10-17 RX ADMIN — ASPIRIN 81 MG CHEWABLE TABLET 81 MG: 81 TABLET CHEWABLE at 10:15

## 2023-10-17 RX ADMIN — IPRATROPIUM BROMIDE AND ALBUTEROL SULFATE 3 ML: 2.5; .5 SOLUTION RESPIRATORY (INHALATION) at 22:24

## 2023-10-17 RX ADMIN — OXYBUTYNIN CHLORIDE 5 MG: 5 TABLET ORAL at 12:46

## 2023-10-17 RX ADMIN — OXYCODONE HYDROCHLORIDE 5 MG: 5 TABLET ORAL at 20:48

## 2023-10-17 RX ADMIN — APIXABAN 2.5 MG: 2.5 TABLET, FILM COATED ORAL at 20:48

## 2023-10-17 RX ADMIN — IPRATROPIUM BROMIDE AND ALBUTEROL SULFATE 3 ML: 2.5; .5 SOLUTION RESPIRATORY (INHALATION) at 07:20

## 2023-10-17 RX ADMIN — IRON SUCROSE 200 MG: 20 INJECTION, SOLUTION INTRAVENOUS at 20:48

## 2023-10-17 RX ADMIN — TIZANIDINE 2 MG: 2 TABLET ORAL at 10:16

## 2023-10-17 RX ADMIN — INSULIN LISPRO 3 UNITS: 100 INJECTION, SOLUTION INTRAVENOUS; SUBCUTANEOUS at 18:44

## 2023-10-17 RX ADMIN — OXYBUTYNIN CHLORIDE 5 MG: 5 TABLET ORAL at 20:48

## 2023-10-17 RX ADMIN — APIXABAN 2.5 MG: 2.5 TABLET, FILM COATED ORAL at 10:13

## 2023-10-17 RX ADMIN — DEXAMETHASONE 6 MG: 6 TABLET ORAL at 18:42

## 2023-10-17 RX ADMIN — CLOPIDOGREL BISULFATE 75 MG: 75 TABLET ORAL at 10:15

## 2023-10-17 RX ADMIN — OXYCODONE HYDROCHLORIDE AND ACETAMINOPHEN 500 MG: 500 TABLET ORAL at 12:44

## 2023-10-17 RX ADMIN — Medication 3 L/MIN: at 11:15

## 2023-10-17 RX ADMIN — PIPERACILLIN SODIUM AND TAZOBACTAM SODIUM 4.5 G: 4; .5 INJECTION, SOLUTION INTRAVENOUS at 18:43

## 2023-10-17 RX ADMIN — Medication 324 MG: at 12:44

## 2023-10-17 RX ADMIN — IPRATROPIUM BROMIDE AND ALBUTEROL SULFATE 3 ML: 2.5; .5 SOLUTION RESPIRATORY (INHALATION) at 12:04

## 2023-10-17 RX ADMIN — INSULIN LISPRO 3 UNITS: 100 INJECTION, SOLUTION INTRAVENOUS; SUBCUTANEOUS at 10:20

## 2023-10-17 RX ADMIN — CALCIUM CARBONATE (ANTACID) CHEW TAB 500 MG 500 MG: 500 CHEW TAB at 10:16

## 2023-10-17 RX ADMIN — PIPERACILLIN SODIUM AND TAZOBACTAM SODIUM 4.5 G: 4; .5 INJECTION, SOLUTION INTRAVENOUS at 02:57

## 2023-10-17 RX ADMIN — ROSUVASTATIN CALCIUM 20 MG: 20 TABLET, COATED ORAL at 10:16

## 2023-10-17 RX ADMIN — OXYCODONE HYDROCHLORIDE 5 MG: 5 TABLET ORAL at 10:16

## 2023-10-17 RX ADMIN — PIPERACILLIN SODIUM AND TAZOBACTAM SODIUM 4.5 G: 4; .5 INJECTION, SOLUTION INTRAVENOUS at 10:16

## 2023-10-17 RX ADMIN — DEXAMETHASONE 6 MG: 6 TABLET ORAL at 10:16

## 2023-10-17 RX ADMIN — LATANOPROST 1 DROP: 50 SOLUTION OPHTHALMIC at 20:48

## 2023-10-17 RX ADMIN — DORZOLAMIDE HYDROCHLORIDE 1 DROP: 20 SOLUTION/ DROPS OPHTHALMIC at 20:48

## 2023-10-17 RX ADMIN — OXYCODONE HYDROCHLORIDE 5 MG: 5 TABLET ORAL at 06:01

## 2023-10-17 RX ADMIN — VANCOMYCIN 750 MG: 750 INJECTION, SOLUTION INTRAVENOUS at 05:57

## 2023-10-17 SDOH — ECONOMIC STABILITY: INCOME INSECURITY: IN THE LAST 12 MONTHS, WAS THERE A TIME WHEN YOU WERE NOT ABLE TO PAY THE MORTGAGE OR RENT ON TIME?: NO

## 2023-10-17 SDOH — ECONOMIC STABILITY: INCOME INSECURITY: HOW HARD IS IT FOR YOU TO PAY FOR THE VERY BASICS LIKE FOOD, HOUSING, MEDICAL CARE, AND HEATING?: NOT HARD AT ALL

## 2023-10-17 SDOH — ECONOMIC STABILITY: TRANSPORTATION INSECURITY
IN THE PAST 12 MONTHS, HAS LACK OF TRANSPORTATION KEPT YOU FROM MEETINGS, WORK, OR FROM GETTING THINGS NEEDED FOR DAILY LIVING?: NO

## 2023-10-17 SDOH — SOCIAL STABILITY: SOCIAL INSECURITY
WITHIN THE LAST YEAR, HAVE TO BEEN RAPED OR FORCED TO HAVE ANY KIND OF SEXUAL ACTIVITY BY YOUR PARTNER OR EX-PARTNER?: NO

## 2023-10-17 SDOH — ECONOMIC STABILITY: HOUSING INSECURITY
IN THE LAST 12 MONTHS, WAS THERE A TIME WHEN YOU DID NOT HAVE A STEADY PLACE TO SLEEP OR SLEPT IN A SHELTER (INCLUDING NOW)?: NO

## 2023-10-17 SDOH — HEALTH STABILITY: MENTAL HEALTH
STRESS IS WHEN SOMEONE FEELS TENSE, NERVOUS, ANXIOUS, OR CAN'T SLEEP AT NIGHT BECAUSE THEIR MIND IS TROUBLED. HOW STRESSED ARE YOU?: RATHER MUCH

## 2023-10-17 SDOH — SOCIAL STABILITY: SOCIAL INSECURITY
WITHIN THE LAST YEAR, HAVE YOU BEEN KICKED, HIT, SLAPPED, OR OTHERWISE PHYSICALLY HURT BY YOUR PARTNER OR EX-PARTNER?: NO

## 2023-10-17 SDOH — SOCIAL STABILITY: SOCIAL NETWORK: HOW OFTEN DO YOU GET TOGETHER WITH FRIENDS OR RELATIVES?: MORE THAN THREE TIMES A WEEK

## 2023-10-17 SDOH — ECONOMIC STABILITY: TRANSPORTATION INSECURITY
IN THE PAST 12 MONTHS, HAS THE LACK OF TRANSPORTATION KEPT YOU FROM MEDICAL APPOINTMENTS OR FROM GETTING MEDICATIONS?: NO

## 2023-10-17 SDOH — SOCIAL STABILITY: SOCIAL NETWORK: ARE YOU MARRIED, WIDOWED, DIVORCED, SEPARATED, NEVER MARRIED, OR LIVING WITH A PARTNER?: MARRIED

## 2023-10-17 SDOH — ECONOMIC STABILITY: FOOD INSECURITY: WITHIN THE PAST 12 MONTHS, THE FOOD YOU BOUGHT JUST DIDN'T LAST AND YOU DIDN'T HAVE MONEY TO GET MORE.: NEVER TRUE

## 2023-10-17 SDOH — ECONOMIC STABILITY: FOOD INSECURITY: WITHIN THE PAST 12 MONTHS, YOU WORRIED THAT YOUR FOOD WOULD RUN OUT BEFORE YOU GOT MONEY TO BUY MORE.: NEVER TRUE

## 2023-10-17 SDOH — SOCIAL STABILITY: SOCIAL INSECURITY: WITHIN THE LAST YEAR, HAVE YOU BEEN HUMILIATED OR EMOTIONALLY ABUSED IN OTHER WAYS BY YOUR PARTNER OR EX-PARTNER?: NO

## 2023-10-17 SDOH — ECONOMIC STABILITY: INCOME INSECURITY: IN THE PAST 12 MONTHS, HAS THE ELECTRIC, GAS, OIL, OR WATER COMPANY THREATENED TO SHUT OFF SERVICE IN YOUR HOME?: NO

## 2023-10-17 SDOH — SOCIAL STABILITY: SOCIAL NETWORK
IN A TYPICAL WEEK, HOW MANY TIMES DO YOU TALK ON THE PHONE WITH FAMILY, FRIENDS, OR NEIGHBORS?: MORE THAN THREE TIMES A WEEK

## 2023-10-17 SDOH — SOCIAL STABILITY: SOCIAL INSECURITY: WITHIN THE LAST YEAR, HAVE YOU BEEN AFRAID OF YOUR PARTNER OR EX-PARTNER?: NO

## 2023-10-17 ASSESSMENT — ENCOUNTER SYMPTOMS
FEVER: 0
POLYDIPSIA: 0
JOINT SWELLING: 1
WEAKNESS: 0
FATIGUE: 0
CONSTIPATION: 1
SINUS PRESSURE: 0
DIAPHORESIS: 0
DIZZINESS: 0
VOMITING: 1
ABDOMINAL PAIN: 1
SORE THROAT: 1
PHOTOPHOBIA: 0
VOICE CHANGE: 0
ANAL BLEEDING: 1
LIGHT-HEADEDNESS: 0
RHINORRHEA: 0
STRIDOR: 0
CHOKING: 0
FACIAL ASYMMETRY: 0
NUMBNESS: 0
FACIAL SWELLING: 0
COUGH: 0
CHILLS: 0
APPETITE CHANGE: 0
WHEEZING: 0
SHORTNESS OF BREATH: 0
HEADACHES: 0
UNEXPECTED WEIGHT CHANGE: 0
PALPITATIONS: 0
BLOOD IN STOOL: 1
HEMATOLOGIC/LYMPHATIC NEGATIVE: 1
CHEST TIGHTNESS: 0
POLYPHAGIA: 0
DIARRHEA: 1
PSYCHIATRIC NEGATIVE: 1
NAUSEA: 1
APNEA: 0
SINUS PAIN: 0
SPEECH DIFFICULTY: 0

## 2023-10-17 ASSESSMENT — COGNITIVE AND FUNCTIONAL STATUS - GENERAL
TOILETING: A LOT
HELP NEEDED FOR BATHING: A LITTLE
DRESSING REGULAR LOWER BODY CLOTHING: A LITTLE
DRESSING REGULAR UPPER BODY CLOTHING: A LITTLE
DAILY ACTIVITIY SCORE: 19

## 2023-10-17 ASSESSMENT — PAIN SCALES - GENERAL
PAINLEVEL_OUTOF10: 0 - NO PAIN
PAINLEVEL_OUTOF10: 8
PAINLEVEL_OUTOF10: 4
PAINLEVEL_OUTOF10: 3

## 2023-10-17 ASSESSMENT — ACTIVITIES OF DAILY LIVING (ADL)
BATHING_LEVEL_OF_ASSISTANCE: MINIMUM ASSISTANCE
HOME_MANAGEMENT_TIME_ENTRY: 30

## 2023-10-17 ASSESSMENT — PAIN - FUNCTIONAL ASSESSMENT
PAIN_FUNCTIONAL_ASSESSMENT: FLACC (FACE, LEGS, ACTIVITY, CRY, CONSOLABILITY)
PAIN_FUNCTIONAL_ASSESSMENT: 0-10

## 2023-10-17 NOTE — PROGRESS NOTES
Occupational Therapy    Occupational Therapy Treatment    Name: Samira López  MRN: [id number]  : 1940  Date: 10/17/23    Subjective   General:  OT Last Visit  OT Received On: 10/17/23  General  Reason for Referral: decreased functional status.  Referred By: Dr Jaquan Ríos  Past Medical History Relevant to Rehab: Acute respirtatory failure, TMA, DM  Family/Caregiver Present: No  Prior to Session Communication: Bedside nurse, PCT/NA/CTA  Patient Position Received: Up in chair  Preferred Learning Style: auditory  General Comment: Patient is s/p left TMA approximately 2wks ago.  She went to SNf for rehab for 2weeks.  She was discharged home and the next day she presented to the ED with weakness and fall.     Pain Assessment:  Pain Assessment  Pain Assessment: 0-10  Pain Score: 3  Pain Type: Acute pain  Pain Location: Rib cage  Pain Orientation: Right     Objective   Activities of Daily Living: UE Bathing  UE Bathing Level of Assistance: Independent  UE Bathing Where Assessed: Other (Comment) (up in chair)  UE Bathing Comments: required set up of wash cloth, towel , and soap to complete    LE Bathing  LE Bathing Level of Assistance: Minimum assistance  LE Bathing Where Assessed:  (up in chair)  LE Bathing Comments: needed assist with right foot bathing and lower leg.  LLE is not accessible due to wound dressing in place.    UE Dressing  UE Dressing Level of Assistance: Minimum assistance  UE Dressing Where Assessed: Chair  UE Dressing Comments: level of assist needed due to IV line    LE Dressing  LE Dressing: Yes  Sock Level of Assistance: Independent  Shoe Level of Assistance: Independent  LE Dressing Where Assessed: Chair  LE Dressing Comments: doffed right shoe     Outcome Measures:  [facility] Daily Activity  Putting on and taking off regular lower body clothing: A little  Bathing (including washing, rinsing, drying): A little  Putting on and taking off regular upper body clothing: A little  Toileting,  which includes using toilet, bedpan or urinal: A lot  Taking care of personal grooming such as brushing teeth: None  Eating Meals: None  Daily Activity - Total Score: 19    Goals:         Problem: Bathing  Goal: STG - Patient will bathe body UB/LB independent with AE as needed  Outcome: Progressing     Problem: Dressings Lower Extremities  Goal: LTG - Patient will utilize adaptive techniques/equipment to dress lower body independently  Outcome: Progressing     Problem: Mobility  Goal: Goal 1Patient will perform functional mobility to and from the bathroom with close supervision and 2ww  Outcome: Progressing     Problem: Toileting  Goal: LTG - Patient will demonstrate use of appropriate intervention for safe toileting independent with 2ww  Outcome: Progressing     Problem: Transfers  Goal: LTG - Patient will demonstrate safe transfer techniques independent with 2ww  Outcome: Progressing

## 2023-10-17 NOTE — CONSULTS
Wound Care Consult     Visit Date: 10/17/2023      Patient Name: Samira López         MRN: [id number]           YOB: 1940     Reason for Consult: Sacrum and Left ischial wounds     Wound History: Present on admission. Stage 1 Pressure injury to Sacrum and Left Ischium      A 82 y.o. year old female admitted for Principal Problem:    Acute respiratory failure (CMS/[facility])  Active Problems:    Arthritis, multiple joint involvement    Balance disorder    Lumbar canal stenosis    Chronic diastolic congestive heart failure (CMS/[facility])    Dermatitis    Obstructive sleep apnea, adult    Peripheral polyneuropathy    Anemia due to chronic kidney disease    Cellulitis and abscess of lower extremity    Cellulitis of left lower limb    Cutaneous abscess of left lower limb    Malignant neoplasm of unspecified site of unspecified female breast (CMS/[facility])    NATALIYA (obstructive sleep apnea)    Personal history of nicotine dependence    Primary generalized (osteo)arthritis    Abnormal computed tomography scan    Accidental fall    Anxiety    Asthenia    Chronic respiratory failure with hypoxia (CMS/[facility])    Diabetes mellitus type 2 in obese (CMS/[facility])    Gangrene of foot (CMS/[facility])    Dyspnea      Past Medical History:   Diagnosis Date    Abnormal findings on diagnostic imaging of heart and coronary circulation 07/30/2019    Abnormal echocardiogram    Abrasion, unspecified foot, initial encounter 06/15/2020    Abrasion, foot    Body mass index (BMI) 31.0-31.9, adult 02/22/2021    BMI 31.0-31.9,adult    Body mass index (BMI) 37.0-37.9, adult     BMI 37.0-37.9, adult    Body mass index (BMI) 38.0-38.9, adult     BMI 38.0-38.9,adult    Body mass index (BMI)30.0-30.9, adult 07/25/2022    BMI 30.0-30.9,adult    Body mass index (BMI)30.0-30.9, adult 06/16/2021    BMI 30.0-30.9,adult    Body mass index (BMI)30.0-30.9, adult 02/28/2022    BMI 30.0-30.9,adult    Body mass index (BMI)30.0-30.9, adult 06/02/2022    BMI 30.0-30.9,adult     Body mass index (BMI)30.0-30.9, adult 04/04/2022    BMI 30.0-30.9,adult    Cellulitis of left lower limb 09/20/2022    Cellulitis of foot, left    Combined forms of age-related cataract, bilateral 07/27/2022    Mixed type age-related cataract, both eyes    Dyskinesia of esophagus 12/05/2019    Esophageal spasm    Elevated blood-pressure reading, without diagnosis of hypertension 11/03/2015    Elevated blood pressure reading without diagnosis of hypertension    Epistaxis 11/25/2019    Mild epistaxis    Glaucoma     Hypoxemia 08/17/2020    Hypoxia    Idiopathic sleep related nonobstructive alveolar hypoventilation 08/17/2020    Oxygen desaturation during sleep    Idiopathic sleep related nonobstructive alveolar hypoventilation 08/17/2020    Nocturnal hypoxia    Local infection of the skin and subcutaneous tissue, unspecified 04/13/2020    Foot infection    Nipple discharge 06/02/2017    Nipple discharge in female    Non-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 foot with unspecified severity (CMS/[facility]) 07/12/2022    Foot ulcer, left    Non-pressure chronic ulcer of unspecified part of left lower leg with unspecified severity (CMS/HCC) 06/16/2021    Leg ulcer, left    Other conditions influencing health status 05/19/2015    History of cough    Other disorders of electrolyte and fluid balance, not elsewhere classified 06/26/2019    Electrolyte and fluid disorder    Other pulmonary embolism with acute cor pulmonale (CMS/[facility]) 06/16/2021    Pulmonary embolism with acute cor pulmonale, unspecified chronicity, unspecified pulmonary embolism type    Other specified personal risk factors, not elsewhere classified 07/31/2019    At risk for complication    Other specified postprocedural states 06/29/2017    History of right breast biopsy    Other symptoms and signs involving the nervous system 08/17/2020    Suspected sleep apnea    Pain in unspecified foot 04/29/2020    Ischemic foot pain at rest    Pain in unspecified hand  07/30/2013    Pain in hand    Pain in unspecified hand 06/22/2020    Pain in hand    Pain in unspecified hip 06/12/2013    Joint pain, hip    Pain in unspecified hip 06/22/2020    Joint pain, hip    Pain, unspecified 02/28/2022    Pain    Personal history of diseases of the skin and subcutaneous tissue 02/12/2020    History of dermatitis    Personal history of other diseases of the circulatory system 06/16/2021    History of pulmonary hypertension    Personal history of other diseases of the musculoskeletal system and connective tissue 12/17/2020    History of low back pain    Personal history of other diseases of the musculoskeletal system and connective tissue 12/04/2020    History of fibromyalgia    Personal history of other diseases of the nervous system and sense organs 08/17/2020    History of sciatica    Personal history of other diseases of the respiratory system 05/19/2015    History of laryngitis    Personal history of other diseases of the respiratory system 12/23/2017    History of sinusitis    Personal history of other specified conditions 06/29/2017    History of abnormal mammogram    Personal history of other specified conditions 07/05/2018    History of urinary incontinence    Personal history of other specified conditions 06/29/2017    History of breast lump    Personal history of other specified conditions 07/31/2019    History of shortness of breath    Personal history of other specified conditions 06/22/2020    History of edema    Personal history of pulmonary embolism 04/06/2022    History of pulmonary embolism    Preglaucoma, unspecified, unspecified eye     Glaucoma suspect    Radiculopathy, lumbar region 02/23/2022    Lumbar radicular pain    Shortness of breath 08/08/2023    Sleep apnea, unspecified 02/12/2020    Sleep disorder breathing    Spondylosis without myelopathy or radiculopathy, lumbar region 02/23/2022    Lumbar spondylosis    Unspecified cataract 06/17/2020    Cataract of left  eye    Unspecified cataract     Cataract of right eye    Unspecified cataract     Cataract of left eye      Past Surgical History:   Procedure Laterality Date    CHOLECYSTECTOMY  06/12/2013    Cholecystectomy Laparoscopic    CT AORTA AND BILATERAL ILIOFEMORAL RUNOFF ANGIOGRAM W AND/OR WO IV CONTRAST  4/25/2020    CT AORTA AND BILATERAL ILIOFEMORAL RUNOFF ANGIOGRAM W AND/OR WO IV CONTRAST 4/25/2020 [facility] CLINICAL LEGACY    CT AORTA AND BILATERAL ILIOFEMORAL RUNOFF ANGIOGRAM W AND/OR WO IV CONTRAST  9/8/2022    CT AORTA AND BILATERAL ILIOFEMORAL RUNOFF ANGIOGRAM W AND/OR WO IV CONTRAST [facility] INPATIENT LEGACY    CT AORTA AND BILATERAL ILIOFEMORAL RUNOFF ANGIOGRAM W AND/OR WO IV CONTRAST  5/17/2023    CT AORTA AND BILATERAL ILIOFEMORAL RUNOFF ANGIOGRAM W AND/OR WO IV CONTRAST [facility] INPATIENT LEGACY    CT GUIDED PERCUTANEOUS BIOPSY BONE DEEP  12/13/2018    CT GUIDED PERCUTANEOUS [facility]OPSY BONE DEEP [facility]    FOOT SURGERY  06/29/2017    Foot Surgery    OTHER SURGICAL HISTORY  06/17/2020    Cataract surgery       Scheduled medications  apixaban, 2.5 mg, oral, BID  ascorbic acid, 500 mg, oral, Daily with breakfast  aspirin, 81 mg, oral, Daily  cholecalciferol, 50 mcg, oral, Daily  dexAMETHasone, 6 mg, oral, BID after meals  dorzolamide, 1 drop, Both Eyes, BID  exemestane, 25 mg, oral, Daily  ferrous gluconate, 324 mg, oral, BID after meals  insulin lispro, 0-15 Units, subcutaneous, TID with meals  ipratropium-albuteroL, 3 mL, nebulization, TID  iron sucrose, 200 mg, intravenous, Nightly  ketorolac, 1 drop, Both Eyes, 4x daily  latanoprost, 1 drop, Both Eyes, Nightly  lidocaine, 1 mL, infiltration, Once  metoprolol succinate XL, 50 mg, oral, Daily  oxybutynin, 5 mg, oral, BID  pantoprazole, 40 mg, oral, Daily before breakfast  piperacillin-tazobactam, 4.5 g, intravenous, q8h  polyethylene glycol, 17 g, oral, Daily  remdesivir, 100 mg, intravenous, q24h  rosuvastatin, 20 mg, oral, Daily  sodium hypochlorite, ,  irrigation, Daily  [START ON 10/18/2023] vancomycin-diluent combo no.1, 1 g, intravenous, q24h      Continuous medications  oxygen, , Last Rate: 3 L/min (10/17/23 1115)      PRN medications  PRN medications: acetaminophen **OR** acetaminophen **OR** acetaminophen, acetaminophen, alteplase, benzocaine-menthol, dextromethorphan-guaifenesin, dextrose 10 % in water (D10W), dextrose, diphenhydrAMINE, glucagon, guaiFENesin, oxyCODONE, polyethylene glycol, tiZANidine    Allergies   Allergen Reactions    Cephalosporins Hives    Ciprofloxacin Hives    Codeine Unknown    Epoetin Josue Unknown     Pertinent Labs:   Albumin   Date Value Ref Range Status   10/15/2023 3.1 (L) 3.5 - 5.0 g/dL Final   10/26/2022 4.1 3.4 - 5.0 g/dL Final       Wound Assessment:  Wound 10/16/23 Buttocks (Active)   Wound Image   10/16/23 0055       Wound 10/16/23 Buttocks (Active)       Wound 10/16/23 Venous Ulcer Dorsal;Left (Active)   Dressing Unna boot 10/16/23 0100   Dressing Status Clean;Dry 10/16/23 0100       Wound Team Summary Assessment: Exam conducted on day 2 of stay with knowledge of Floor Nurse. Patient on isolation precautions for COVID. Introductions made to patient, alert and oriented. On exam Patient sitting up in chair, wearing O2 nasal cannula, Left lower leg wrapped in Unaboot and acewrap. Being managed by Podiatry. Patient had a Transmetatarsal amputation on 9/18/23 and went to rehab with IV antibiotics and discharged home.  Patient was admitted to hospital for weakness, falls, and possible infection of left foot. Patient also recently tested COVID+ after coming home from rehab. Patient has Stage 1 pressure injuries to Sacrum and Left ischium. Recommend, Calazime cream, Sacral border dressing and Waffle mattress and chair cushion. Patient is on an Advanta 2 bedframe with Accumax mattress and to be offloading heels. Itzel Fuller RN updated, to continue pressure injury prevention interventions, Woundcare, and nursing to continue to  follow providers orders. Reconsult Wound RN PRN. Samanta NEGRON RN     Wound Team Plan: Continue to follow Providers orders, LLE- change unaboot Q72hrs. Sacrum and Left ischium- cleanse with soap and water, pat dry, apply Calazime cream and Sacral border. Continue to offload.      Samanta Santana RN  10/17/2023  4:02 PM

## 2023-10-17 NOTE — CARE PLAN
Problem: Respiratory  Goal: Patent airway maintained this shift  Outcome: Progressing      Hpi Title: Evaluation of Skin Lesions

## 2023-10-17 NOTE — PROGRESS NOTES
Samira López is a 82 y.o. female on day 1 of admission presenting with Acute respiratory failure (CMS/HCC).    Subjective   Patient seen and examined.  Resting sitting up in the chair in no acute distress.  Awake alert oriented x 3.  No complaints.    Objective   Droplet plus precautions in place    Physical Exam  Vitals reviewed.   Constitutional:       General: She is not in acute distress.     Appearance: She is obese. She is not ill-appearing or toxic-appearing.   HENT:      Head: Normocephalic and atraumatic.      Right Ear: Tympanic membrane normal.      Left Ear: Tympanic membrane normal.      Nose: Nose normal.      Mouth/Throat:      Mouth: Mucous membranes are moist.      Pharynx: Oropharynx is clear.   Eyes:      Extraocular Movements: Extraocular movements intact.      Conjunctiva/sclera: Conjunctivae normal.      Pupils: Pupils are equal, round, and reactive to light.   Cardiovascular:      Rate and Rhythm: Normal rate and regular rhythm.      Pulses: Normal pulses.      Heart sounds: Normal heart sounds.   Pulmonary:      Effort: Pulmonary effort is normal. No respiratory distress.      Breath sounds: Normal breath sounds. No wheezing, rhonchi or rales.      Comments: On 3 liters nasal cannula (chronic oxygen)  Abdominal:      General: Bowel sounds are normal. There is no distension.      Palpations: Abdomen is soft.      Tenderness: There is no abdominal tenderness.   Genitourinary:     Comments: /rectal examination deferred  Musculoskeletal:         General: Swelling present. Normal range of motion.      Cervical back: Normal range of motion and neck supple.      Right lower leg: Edema present.      Left lower leg: Edema present.   Skin:     General: Skin is warm and dry.      Capillary Refill: Capillary refill takes less than 2 seconds.      Comments: Left lower extremity unna boot in place dressing intact not removed   Neurological:      General: No focal deficit present.      Mental Status:  "She is alert and oriented to person, place, and time.      Comments: Generalized weakness  No focal weakness   Psychiatric:         Mood and Affect: Mood normal.         Behavior: Behavior normal.       Last Recorded Vitals  Blood pressure 95/77, pulse 67, temperature 36.5 °C (97.7 °F), temperature source Temporal, resp. rate 14, height 1.791 m (5' 10.5\"), weight 81.3 kg (179 lb 3.7 oz), SpO2 98 %.    Intake/Output last 3 Shifts:  I/O last 3 completed shifts:  In: 840 (10.3 mL/kg) [P.O.:340; IV Piggyback:500]  Out: 401 (4.9 mL/kg) [Urine:400 (0.1 mL/kg/hr); Stool:1]  Weight: 81.3 kg     Telemetry    Relevant Results  Results for orders placed or performed during the hospital encounter of 10/15/23 (from the past 24 hour(s))   Urinalysis with Reflex Microscopic   Result Value Ref Range    Color, Urine Light-Yellow Light-Yellow, Yellow, Dark-Yellow    Appearance, Urine Clear Clear    Specific Gravity, Urine 1.011 1.005 - 1.035    pH, Urine 7.0 5.0, 5.5, 6.0, 6.5, 7.0, 7.5, 8.0    Protein, Urine NEGATIVE NEGATIVE, 10 (TRACE), 20 (TRACE) mg/dL    Glucose, Urine Normal Normal mg/dL    Blood, Urine NEGATIVE NEGATIVE    Ketones, Urine NEGATIVE NEGATIVE mg/dL    Bilirubin, Urine NEGATIVE NEGATIVE    Urobilinogen, Urine Normal Normal mg/dL    Nitrite, Urine NEGATIVE NEGATIVE    Leukocyte Esterase, Urine 25 Jacques/µL (A) NEGATIVE   Urinalysis Microscopic Only   Result Value Ref Range    WBC, Urine 1-5 1-5, NONE /HPF    RBC, Urine 1-2 NONE, 1-2, 3-5 /HPF    Squamous Epithelial Cells, Urine 1-9 (SPARSE) Reference range not established. /HPF    Mucus, Urine 1+ Reference range not established. /LPF    Hyaline Casts, Urine OCCASIONAL (A) NONE /LPF   Basic Metabolic Panel   Result Value Ref Range    Glucose 173 (H) 65 - 99 mg/dL    Sodium 143 133 - 145 mmol/L    Potassium 4.2 3.4 - 5.1 mmol/L    Chloride 106 97 - 107 mmol/L    Bicarbonate 24 24 - 31 mmol/L    Urea Nitrogen 17 8 - 25 mg/dL    Creatinine 0.90 0.40 - 1.60 mg/dL    eGFR 64 " >60 mL/min/1.73m*2    Calcium 9.7 8.5 - 10.4 mg/dL    Anion Gap 13 <=19 mmol/L   CBC   Result Value Ref Range    WBC 12.3 (H) 4.4 - 11.3 x10*3/uL    nRBC 0.0 0.0 - 0.0 /100 WBCs    RBC 2.59 (L) 4.00 - 5.20 x10*6/uL    Hemoglobin 9.4 (L) 12.0 - 16.0 g/dL    Hematocrit 26.6 (L) 36.0 - 46.0 %     (H) 80 - 100 fL    MCH 36.3 (H) 26.0 - 34.0 pg    MCHC 35.3 32.0 - 36.0 g/dL    RDW 18.4 (H) 11.5 - 14.5 %    Platelets 200 150 - 450 x10*3/uL    MPV 10.5 7.5 - 11.5 fL   Iron and TIBC   Result Value Ref Range    Iron <20 (L) 30 - 160 ug/dL    UIBC 184 110 - 370 ug/dL    TIBC      % Saturation     Ferritin   Result Value Ref Range    Ferritin 425 (H) 13 - 150 ng/mL   Vitamin B12   Result Value Ref Range    Vitamin B12 677 211 - 946 pg/mL   Folate   Result Value Ref Range    Folate, Serum 13.6 4.2 - 19.9 ng/mL   C-reactive protein   Result Value Ref Range    C-Reactive Protein 5.90 (H) 0.00 - 2.00 mg/dL   Sedimentation rate, automated   Result Value Ref Range    Sedimentation Rate 36 (H) 0 - 30 mm/h   POCT GLUCOSE   Result Value Ref Range    POCT Glucose 153 (H) 74 - 99 mg/dL   POCT GLUCOSE   Result Value Ref Range    POCT Glucose 180 (H) 74 - 99 mg/dL   POCT GLUCOSE   Result Value Ref Range    POCT Glucose 184 (H) 74 - 99 mg/dL     XR foot left 3+ views    Result Date: 10/15/2023  Interpreted By:  Joshua Galeana, STUDY: XR FOOT LEFT 3+ VIEWS; ;  10/15/2023 5:28 pm   INDICATION: Signs/Symptoms:infection post op.   COMPARISON: Intraoperative exam from 09/14/2023   ACCESSION NUMBER(S): [id number]   ORDERING CLINICIAN: JF MULLINS   FINDINGS: There is again recent amputation through the distal 1st through 5th metatarsal bones. There are no focal lytic lesions or periosteal reaction to suggest acute osteomyelitis. Degenerative changes of tarsal joints are present, with joint space narrowing and small dorsal osteophytes. The alignment is anatomic. There is diffuse soft tissue swelling.       Diffuse soft tissue swelling  status post recent 1st through 5th distal metatarsal amputation. No acute osteomyelitis.   Signed by: Joshua Galeana 10/15/2023 5:36 PM Dictation workstation:   [id number]    XR chest 1 view    Result Date: 10/15/2023  Interpreted By:  Joshua Galeana, STUDY: XR CHEST 1 VIEW; 10/15/2023 5:30 pm   INDICATION: Signs/Symptoms:dyspnea   COMPARISON: May 2023   ACCESSION NUMBER(S): [id number]   ORDERING CLINICIAN: JF MULLINS   FINDINGS: The study is limited due to rotation and poor inspiratory effort, with resultant crowding of the pulmonary vasculature. The cardiac silhouette is borderline prominent, exaggerated by the technique. Mild bilateral perihilar interstitial infiltrates are noted. There is no pneumothorax or significant effusion. The osseous structures are unremarkable.       Allowing for the aforementioned limitation, bilateral perihilar interstitial infiltrates, most likely due to mild/early pulmonary edema; correlate clinically and follow-up as needed.   Signed by: Joshua Galeana 10/15/2023 5:34 PM Dictation workstation:   [id number]     Scheduled medications  amLODIPine, 5 mg, oral, Daily  apixaban, 2.5 mg, oral, BID  ascorbic acid, 500 mg, oral, Daily with breakfast  aspirin, 81 mg, oral, Daily  calcium carbonate, 1 tablet, oral, Daily  cholecalciferol, 50 mcg, oral, Daily  cholecalciferol, 125 mcg, oral, Daily  clopidogrel, 75 mg, oral, Daily  dexAMETHasone, 6 mg, oral, BID after meals  dorzolamide, 1 drop, Both Eyes, BID  exemestane, 25 mg, oral, Daily  ferrous gluconate, 324 mg, oral, BID after meals  furosemide, 40 mg, oral, Daily  insulin lispro, 0-15 Units, subcutaneous, TID with meals  ipratropium-albuteroL, 3 mL, nebulization, TID  iron sucrose, 200 mg, intravenous, Nightly  ketorolac, 1 drop, Both Eyes, 4x daily  latanoprost, 1 drop, Both Eyes, Nightly  lidocaine, 1 mL, infiltration, Once  lisinopril, 20 mg, oral, Daily  metoprolol succinate XL, 50 mg, oral, Daily  oxybutynin, 5 mg, oral,  BID  oxygen, , inhalation, Nightly  palbociclib, 125 mg, oral, Daily  palbociclib, 100 mg, oral, Daily  palbociclib, 100 mg, oral, Daily  pantoprazole, 40 mg, oral, Daily before breakfast  piperacillin-tazobactam, 4.5 g, intravenous, q8h  remdesivir, 100 mg, intravenous, q24h  rosuvastatin, 20 mg, oral, Daily  sodium hypochlorite, , irrigation, Daily  tiZANidine, 2 mg, oral, TID  vancomycin-diluent combo no.1, 750 mg, intravenous, q12h      Continuous medications     PRN medications  PRN medications: acetaminophen **OR** acetaminophen **OR** acetaminophen, acetaminophen, alteplase, benzocaine-menthol, dextromethorphan-guaifenesin, dextrose 10 % in water (D10W), dextrose, diphenhydrAMINE, glucagon, guaiFENesin, oxyCODONE, polyethylene glycol    ASSESSMENT:  Generalized weakness  Asthenia  Recurrent falls  COVID-19  Acute on chronic hypoxic respiratory failure  History of tobacco use  Left foot  ?cellulitis / diabetic wound  History left foot osteomyelitis, status post transmetatarsal amputation  Peripheral vascular disease  Peripheral arterial disease  Type 2 diabetes mellitus  Iron deficiency anemia  Breast carcinoma  Anxiety    PLAN:  IV Remdesivir.  Dexamethasone.  Oxygen, baseline.  IV Zosyn, Vancomycin pharmacy dosing.  Monitor Vancomycin level and renal function.  Left foot dressing intact.  Infectious disease consultation.  Podiatry consultation.  Await evaluations and recommendations.  ESR, CRP.  Monitor bmp, cbc.  Chronic anemia + iron deficiency.  IV Venofer.  Guaiac stools.  PT/OT.  Fall precautions.  Up with assistance.  Bed and chair alarm at all times.  Pressure ulcer prevention measures.  Deep vein thrombosis prophylaxis.  Eliquis.  Supportive care.  Patient reassured.  Case management following for discharge planning.  Home medication list is unobtainable.  Obtain and review [address] discharge medication list as she was recently discharged home.  Discussed with nursing and   Khushbu.    ADDENDUM:    Discussed with Podiatry Dr. Loera, recommendations reviewed with Infectious disease.  Limited IV access.  Vascular access nursing on floor.  PICC line per Infectious disease    MALIK Dobbs-CNP

## 2023-10-17 NOTE — PROGRESS NOTES
"[facility] spoke with the pt via extension [phone]. Pt states she was recently discharged from [facility] where she and her  had been staying for rehab. Pt states the facility was to order her a wheelchair and states that if its been delivered its sitting outside her house as her son who lives in the area is currently in [place]. Discussion around dc planning needs with pt stating she might go back to a SNF but definitely does not want to go to [facility] because \"their physical therapy was not extensive enough, I need to go somewhere where the therapy is more extensive.\" [facility] advised that another SNF list can be brought to her but that this worker cannot advise on which facilities if any can provide more extensive physical therapy. [facility] gave the list to the pts bedside Rn with request to pass along to the pt. Pt will require a precert to go to a SNF.       "

## 2023-10-17 NOTE — CARE PLAN
Problem: Respiratory  Goal: Clear secretions with interventions this shift  Flowsheets (Taken 10/16/2023 2022)  Clear secretions with interventions this shift:   Encourage/provide pulmonary hygiene/secretion clearance   Med administration/monitoring of effect  Goal: Wean oxygen to maintain O2 saturation per order/standard this shift  Flowsheets (Taken 10/16/2023 2022)  Wean oxygen to maintain O2 saturation per order/standard this shift: Encourage activity/mobility

## 2023-10-17 NOTE — PROGRESS NOTES
Samira López is a 82 y.o. female on day 2 of admission presenting with Acute respiratory failure (CMS/HCC).      Subjective  Patient sitting up in bed in no acute distress on exam  Denies chest pain or shortness of breath  On 3 L of oxygen  No nausea or vomiting  No fever or chills    Objective    Last Recorded Vitals      10/16/2023    12:14 PM 10/16/2023     4:03 PM 10/16/2023     7:48 PM 10/16/2023     8:16 PM 10/16/2023    11:25 PM 10/17/2023     3:53 AM 10/17/2023     7:58 AM   Vitals   Systolic 117 113 95  82 90 89   Diastolic 55 48 77  58 63 66   Heart Rate 72 80 70 67 69 67 93   Temp 36.4 °C (97.5 °F) 36.4 °C (97.5 °F) 36.5 °C (97.7 °F)  36.6 °C (97.9 °F) 36.2 °C (97.2 °F) 36.7 °C (98.1 °F)   Resp 20 20 19 14 24 22 19             Relevant Results  Results for orders placed or performed during the hospital encounter of 10/15/23 (from the past 24 hour(s))   POCT GLUCOSE   Result Value Ref Range    POCT Glucose 180 (H) 74 - 99 mg/dL   POCT GLUCOSE   Result Value Ref Range    POCT Glucose 184 (H) 74 - 99 mg/dL   Vancomycin   Result Value Ref Range    Vancomycin 18.4 10.0 - 20.0 ug/mL   Basic Metabolic Panel   Result Value Ref Range    Glucose 185 (H) 65 - 99 mg/dL    Sodium 139 133 - 145 mmol/L    Potassium 4.1 3.4 - 5.1 mmol/L    Chloride 104 97 - 107 mmol/L    Bicarbonate 25 24 - 31 mmol/L    Urea Nitrogen 21 8 - 25 mg/dL    Creatinine 0.80 0.40 - 1.60 mg/dL    eGFR 74 >60 mL/min/1.73m*2    Calcium 9.4 8.5 - 10.4 mg/dL    Anion Gap 10 <=19 mmol/L   CBC   Result Value Ref Range    WBC 10.1 4.4 - 11.3 x10*3/uL    nRBC 0.0 0.0 - 0.0 /100 WBCs    RBC 3.06 (L) 4.00 - 5.20 x10*6/uL    Hemoglobin 9.0 (L) 12.0 - 16.0 g/dL    Hematocrit 29.6 (L) 36.0 - 46.0 %    MCV 97 80 - 100 fL    MCH 29.4 26.0 - 34.0 pg    MCHC 30.4 (L) 32.0 - 36.0 g/dL    RDW 17.2 (H) 11.5 - 14.5 %    Platelets 203 150 - 450 x10*3/uL    MPV 11.2 7.5 - 11.5 fL   POCT GLUCOSE   Result Value Ref Range    POCT Glucose 172 (H) 74 - 99 mg/dL        Physical Exam  Constitutional:  Alert and oriented to person, place, date/time in no acute distress.  HEENT:  Atraumatic, normocephalic. PERRL. EOMI.  Nares patent.  Mucous membranes moist.  .   Neck:  Trachea midline.  Respiratory:  Clear to auscultation.  Cardiac:  Regular rate and rhythm.  No murmurs.  Cardiovascular:  No edema of the extremities.  Pulse exam: Radial and femoral pulses palpable bilateral.  Abdominal:  Soft, nontender, nondistended, bowel sounds present.  Musculoskeletal:  Moves extremities freely.  Dermatological: Right foot status post TMA with primary closure, dressing clean dry and intact  Neurological: Alert and oriented to person, place, date/time  Psych:  Calm, cooperative    Assessment/Plan   Acute on chronic respiratory failure  Coronavirus infection  Marginal leukocytosis  Left diabetic foot ulcer-Albrecht grade 3  Possible left foot cellulitis  Wound culture pending gram-positive and gram-negative organisms        Dexamethasone  Remdesivir  Supplemental oxygen as needed  Local care  Glycemic control  Continue vancomycin  Continue Zosyn  Follow-up wound culture, in progress  Blood cultures pending no growth 1 day  Supportive care  Podiatry following  Monitor temperature and WBC  Droplet plus precautions

## 2023-10-17 NOTE — PROGRESS NOTES
"Vancomycin Dosing by Pharmacy- FOLLOW UP    Samira López is a 82 y.o. year old female who Pharmacy has been consulted for vancomycin dosing for cellulitis, skin and soft tissue. Based on the patient's indication and renal status this patient is being dosed based on a goal AUC of 400-600.  Clarified indication of therapy with Dr. Ríos- intended for treatment of cellulitis, not CNS meningoencephalitis    Renal function is currently stable.    Current vancomycin dose: 750 mg given every 12 hours    Estimated vancomycin AUC on current dose: 600 mg/L.hr       Visit Vitals  BP 89/66 (BP Location: Left leg, Patient Position: Lying)   Pulse 93   Temp 36.7 °C (98.1 °F) (Temporal)   Resp 19        Lab Results   Component Value Date    CREATININE 0.80 10/17/2023    CREATININE 0.90 10/16/2023    CREATININE 0.80 10/15/2023    CREATININE 0.74 10/04/2023        Patient weight is   Wt Readings from Last 1 Encounters:   10/16/23 81.3 kg (179 lb 3.7 oz)       No results found for: \"CULTURE\"     I/O last 3 completed shifts:  In: 840 (10.3 mL/kg) [P.O.:340; IV Piggyback:500]  Out: 401 (4.9 mL/kg) [Urine:400 (0.1 mL/kg/hr); Stool:1]  Weight: 81.3 kg     Lab Results   Component Value Date    PATIENTTEMP 37.0 05/13/2023        Assessment/Plan    Increased risk of toxicity with current dosing regimen (Ctrough, ss ~ 20.4). Orders placed for new vancomcyin regimen of 1000 every 24 hours to begin 10/18 @ 0600.    This dosing regimen is predicted by [employer] to result in the following pharmacokinetic parameters:  Loading dose: N/A  Regimen: 1000 mg IV every 24 hours.  Start time: 17:57 on 10/17/2023  Exposure target: AUC24 (range)400-600 mg/L.hr   AUC24,ss: 420 mg/L.hr  Probability of AUC24 > 400: 59 %  Ctrough,ss: 12 mg/L  Probability of Ctrough,ss > 20: 4 %  Probability of nephrotoxicity (Lodise REUBEN 2009): 7 %    The next level will be obtained on 10/19 with AM labs. May be obtained sooner if clinically indicated.  Will continue to " monitor renal function daily while on vancomycin and order serum creatinine at least every 48 hours if not already ordered.  Follow for continued vancomycin needs, clinical response, and signs/symptoms of toxicity.     Jeanmarie Espinosa, [facility]

## 2023-10-17 NOTE — NURSING NOTE
Assumed care of this pt. Pt sitting in bed, breathing even and unlabored. NC in place. NAD, pt able to make needs known. Bed low, call light and belongings in reach. Continuing to monitor

## 2023-10-17 NOTE — CONSULTS
Inpatient consult to Pulmonology  Consult performed by: Maynor Zimmerman MD  Consult ordered by: Jaquan Ríos MD          Pulmonary Consult    Reason For Consult  COVID-19  History Of Present Illness  Samira López is a 82 y.o. female presenting with Acute respiratory failure (CMS/[facility]).     Past Medical History  She has a past medical history of Abnormal findings on diagnostic imaging of heart and coronary circulation (07/30/2019), Abrasion, unspecified foot, initial encounter (06/15/2020), Body mass index (BMI) 31.0-31.9, adult (02/22/2021), Body mass index (BMI) 37.0-37.9, adult, Body mass index (BMI) 38.0-38.9, adult, Body mass index (BMI)30.0-30.9, adult (07/25/2022), Body mass index (BMI)30.0-30.9, adult (06/16/2021), Body mass index (BMI)30.0-30.9, adult (02/28/2022), Body mass index (BMI)30.0-30.9, adult (06/02/2022), Body mass index (BMI)30.0-30.9, adult (04/04/2022), Cellulitis of left lower limb (09/20/2022), Combined forms of age-related cataract, bilateral (07/27/2022), Dyskinesia of esophagus (12/05/2019), Elevated blood-pressure reading, without diagnosis of hypertension (11/03/2015), Epistaxis (11/25/2019), Glaucoma, Hypoxemia (08/17/2020), Idiopathic sleep related nonobstructive alveolar hypoventilation (08/17/2020), Idiopathic sleep related nonobstructive alveolar hypoventilation (08/17/2020), Local infection of the skin and subcutaneous tissue, unspecified (04/13/2020), Nipple discharge (06/02/2017), Non-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 foot with unspecified severity (CMS/[facility]) (07/12/2022), Non-pressure chronic ulcer of unspecified part of left lower leg with unspecified severity (CMS/[facility]) (06/16/2021), Other conditions influencing health status (05/19/2015), Other disorders of electrolyte and fluid balance, not elsewhere classified (06/26/2019), Other pulmonary embolism with acute cor pulmonale (CMS/HCC) (06/16/2021), Other specified personal risk factors, not elsewhere  classified (07/31/2019), Other specified postprocedural states (06/29/2017), Other symptoms and signs involving the nervous system (08/17/2020), Pain in unspecified foot (04/29/2020), Pain in unspecified hand (07/30/2013), Pain in unspecified hand (06/22/2020), Pain in unspecified hip (06/12/2013), Pain in unspecified hip (06/22/2020), Pain, unspecified (02/28/2022), Personal history of diseases of the skin and subcutaneous tissue (02/12/2020), Personal history of other diseases of the circulatory system (06/16/2021), Personal history of other diseases of the musculoskeletal system and connective tissue (12/17/2020), Personal history of other diseases of the musculoskeletal system and connective tissue (12/04/2020), Personal history of other diseases of the nervous system and sense organs (08/17/2020), Personal history of other diseases of the respiratory system (05/19/2015), Personal history of other diseases of the respiratory system (12/23/2017), Personal history of other specified conditions (06/29/2017), Personal history of other specified conditions (07/05/2018), Personal history of other specified conditions (06/29/2017), Personal history of other specified conditions (07/31/2019), Personal history of other specified conditions (06/22/2020), Personal history of pulmonary embolism (04/06/2022), Preglaucoma, unspecified, unspecified eye, Radiculopathy, lumbar region (02/23/2022), Shortness of breath (08/08/2023), Sleep apnea, unspecified (02/12/2020), Spondylosis without myelopathy or radiculopathy, lumbar region (02/23/2022), Unspecified cataract (06/17/2020), Unspecified cataract, and Unspecified cataract.    Surgical History  She has a past surgical history that includes Cholecystectomy (06/12/2013); Other surgical history (06/17/2020); Foot surgery (06/29/2017); CT angio aorta and bilateral iliofemoral runoff w and or wo IV contrast (4/25/2020); CT guided percutaneous biopsy bone deep (12/13/2018); CT angio  aorta and bilateral iliofemoral runoff w and or wo IV contrast (9/8/2022); and CT angio aorta and bilateral iliofemoral runoff w and or wo IV contrast (5/17/2023).     Social History  She reports that she quit smoking about 23 years ago. Her smoking use included cigarettes. She has a 22.50 pack-year smoking history. She has never used smokeless tobacco. She reports current alcohol use. She reports that she does not currently use drugs.    Family History  Family History   Problem Relation Name Age of Onset    Breast cancer Mother      Coronary artery disease Father          Allergies  Cephalosporins, Ciprofloxacin, Codeine, and Epoetin shankar    Review of Systems   Constitutional:  Negative for appetite change, chills, diaphoresis, fatigue, fever and unexpected weight change.   HENT:  Positive for drooling, sneezing, sore throat and tinnitus. Negative for congestion, ear discharge, ear pain, facial swelling, hearing loss, mouth sores, nosebleeds, postnasal drip, rhinorrhea, sinus pressure, sinus pain and voice change.    Eyes:  Negative for photophobia and visual disturbance.   Respiratory:  Negative for apnea, cough, choking, chest tightness, shortness of breath, wheezing and stridor.    Cardiovascular:  Negative for chest pain, palpitations and leg swelling.   Gastrointestinal:  Positive for abdominal pain, anal bleeding, blood in stool, constipation, diarrhea, nausea and vomiting.   Endocrine: Negative for polydipsia, polyphagia and polyuria.   Musculoskeletal:  Positive for joint swelling.   Skin: Negative.    Allergic/Immunologic: Negative for environmental allergies, food allergies and immunocompromised state.   Neurological:  Negative for dizziness, syncope, facial asymmetry, speech difficulty, weakness, light-headedness, numbness and headaches.   Hematological: Negative.    Psychiatric/Behavioral: Negative.         Last Recorded Vitals  Blood pressure 89/66, pulse 93, temperature 36.7 °C (98.1 °F), temperature  "source Temporal, resp. rate 19, height 1.791 m (5' 10.5\"), weight 81.3 kg (179 lb 3.7 oz), SpO2 96 %.    Intake/Output    Intake/Output Summary (Last 24 hours) at 10/17/2023 1146  Last data filed at 10/16/2023 1300  Gross per 24 hour   Intake 240 ml   Output 401 ml   Net -161 ml       Physical Exam    Oxygen Therapy  SpO2: 96 %  Oxygen Therapy: Supplemental oxygen  O2 Delivery Method: Nasal cannula       Lines and Tubes:  PICC - Adult 10/16/23 Double lumen Right Brachial vein (Active)   Placement Date/Time: 10/16/23 1630   Hand Hygiene Completed: Yes  Catheter Time Out Checklist Completed: Yes  Size (Fr): 5  Lumen Type: Double lumen  Catheter to Vein Ratio Less Than 50%: Yes  Total Length (cm): 40 cm  External Length (cm): 3 cm  Orie...   Number of days: 0         Scheduled medications  apixaban, 2.5 mg, oral, BID  ascorbic acid, 500 mg, oral, Daily with breakfast  aspirin, 81 mg, oral, Daily  calcium carbonate, 1 tablet, oral, Daily  cholecalciferol, 50 mcg, oral, Daily  cholecalciferol, 125 mcg, oral, Daily  clopidogrel, 75 mg, oral, Daily  dexAMETHasone, 6 mg, oral, BID after meals  dorzolamide, 1 drop, Both Eyes, BID  exemestane, 25 mg, oral, Daily  ferrous gluconate, 324 mg, oral, BID after meals  furosemide, 40 mg, oral, Daily  insulin lispro, 0-15 Units, subcutaneous, TID with meals  ipratropium-albuteroL, 3 mL, nebulization, TID  iron sucrose, 200 mg, intravenous, Nightly  ketorolac, 1 drop, Both Eyes, 4x daily  latanoprost, 1 drop, Both Eyes, Nightly  lidocaine, 1 mL, infiltration, Once  metoprolol succinate XL, 50 mg, oral, Daily  oxybutynin, 5 mg, oral, BID  palbociclib, 125 mg, oral, Daily  palbociclib, 100 mg, oral, Daily  palbociclib, 100 mg, oral, Daily  pantoprazole, 40 mg, oral, Daily before breakfast  piperacillin-tazobactam, 4.5 g, intravenous, q8h  remdesivir, 100 mg, intravenous, q24h  rosuvastatin, 20 mg, oral, Daily  sodium hypochlorite, , irrigation, Daily  tiZANidine, 2 mg, oral, TID  [START " "ON 10/18/2023] vancomycin-diluent combo no.1, 1 g, intravenous, q24h      Continuous medications  oxygen,       PRN medications  PRN medications: acetaminophen **OR** acetaminophen **OR** acetaminophen, acetaminophen, alteplase, benzocaine-menthol, dextromethorphan-guaifenesin, dextrose 10 % in water (D10W), dextrose, diphenhydrAMINE, glucagon, guaiFENesin, oxyCODONE, polyethylene glycol    Relevant Results  Results from last 7 days   Lab Units 10/17/23  0556 10/16/23  0901 10/15/23  1650   WBC AUTO x10*3/uL 10.1 12.3* 7.2   HEMOGLOBIN g/dL 9.0* 9.4* 9.1*   HEMATOCRIT % 29.6* 26.6* 28.0*   PLATELETS AUTO x10*3/uL 203 200 224      Results from last 7 days   Lab Units 10/17/23  0556 10/16/23  0901 10/15/23  1650   SODIUM mmol/L 139 143 140   POTASSIUM mmol/L 4.1 4.2 4.5   CHLORIDE mmol/L 104 106 106   CO2 mmol/L 25 24 24   BUN mg/dL 21 17 14   CREATININE mg/dL 0.80 0.90 0.80   GLUCOSE mg/dL 185* 173* 128*   CALCIUM mg/dL 9.4 9.7 10.1          XR chest 1 view 10/15/2023    Narrative  Interpreted By:  Joshua Galeana,  STUDY:  XR CHEST 1 VIEW; 10/15/2023 5:30 pm    INDICATION:  Signs/Symptoms:dyspnea    COMPARISON:  May 2023    ACCESSION NUMBER(S):  [id number]    ORDERING CLINICIAN:  JF MULLINS    FINDINGS:  The study is limited due to rotation and poor inspiratory effort,  with resultant crowding of the pulmonary vasculature. The cardiac  silhouette is borderline prominent, exaggerated by the technique.  Mild bilateral perihilar interstitial infiltrates are noted. There is  no pneumothorax or significant effusion. The osseous structures are  unremarkable.    Impression  Allowing for the aforementioned limitation, bilateral perihilar  interstitial infiltrates, most likely due to mild/early pulmonary  edema; correlate clinically and follow-up as needed.    Signed by: Joshua Galeana 10/15/2023 5:34 PM  Dictation workstation:   [id number]    Pulmonary Functions Testing Results:    No results found for: \"FEV1\", " "\"FVC\", \"IOX9ISC\", \"TLC\", \"DLCO\"    [unfilled]   Assessment/Plan   Principal Problem:    Acute respiratory failure (CMS/HCC)  Active Problems:    Arthritis, multiple joint involvement    Balance disorder    Lumbar canal stenosis    Chronic diastolic congestive heart failure (CMS/HCC)    Dermatitis    Obstructive sleep apnea, adult    Peripheral polyneuropathy    Anemia due to chronic kidney disease    Cellulitis and abscess of lower extremity    Cellulitis of left lower limb    Cutaneous abscess of left lower limb    Malignant neoplasm of unspecified site of unspecified female breast (CMS/HCC)    NATALIYA (obstructive sleep apnea)    Personal history of nicotine dependence    Primary generalized (osteo)arthritis    Abnormal computed tomography scan    Accidental fall    Anxiety    Asthenia    Chronic respiratory failure with hypoxia (CMS/HCC)    Diabetes mellitus type 2 in obese (CMS/HCC)    Gangrene of foot (CMS/HCC)    Dyspnea      Chronic respiratory failure  Coronavirus infection  Left diabetic foot ulcer-Albrecht grade 3  Possible left foot cellulitis     Stable from respiratory standpoint.  States she is on 3 L at home.  Continue dexamethasone  Continue remdesivir  Continue vancomycin  Continue Zosyn  Follow-up wound culture  Supportive care  ID following      Maynor Zimmerman MD  [facility]   "

## 2023-10-18 ENCOUNTER — APPOINTMENT (OUTPATIENT)
Dept: CARDIOLOGY | Facility: HOSPITAL | Age: 83
DRG: 177 | End: 2023-10-18
Payer: MEDICARE

## 2023-10-18 LAB
ANION GAP SERPL CALC-SCNC: 10 MMOL/L
ATRIAL RATE: 147 BPM
BUN SERPL-MCNC: 19 MG/DL (ref 8–25)
CALCIUM SERPL-MCNC: 9.3 MG/DL (ref 8.5–10.4)
CHLORIDE SERPL-SCNC: 105 MMOL/L (ref 97–107)
CO2 SERPL-SCNC: 27 MMOL/L (ref 24–31)
CREAT SERPL-MCNC: 0.7 MG/DL (ref 0.4–1.6)
ERYTHROCYTE [DISTWIDTH] IN BLOOD BY AUTOMATED COUNT: 16.9 % (ref 11.5–14.5)
GFR SERPL CREATININE-BSD FRML MDRD: 87 ML/MIN/1.73M*2
GLUCOSE BLD MANUAL STRIP-MCNC: 137 MG/DL (ref 74–99)
GLUCOSE BLD MANUAL STRIP-MCNC: 147 MG/DL (ref 74–99)
GLUCOSE BLD MANUAL STRIP-MCNC: 192 MG/DL (ref 74–99)
GLUCOSE SERPL-MCNC: 169 MG/DL (ref 65–99)
HCT VFR BLD AUTO: 28.7 % (ref 36–46)
HGB BLD-MCNC: 9 G/DL (ref 12–16)
MCH RBC QN AUTO: 30.1 PG (ref 26–34)
MCHC RBC AUTO-ENTMCNC: 31.4 G/DL (ref 32–36)
MCV RBC AUTO: 96 FL (ref 80–100)
NRBC BLD-RTO: 0 /100 WBCS (ref 0–0)
PLATELET # BLD AUTO: 211 X10*3/UL (ref 150–450)
PMV BLD AUTO: 10.7 FL (ref 7.5–11.5)
POTASSIUM SERPL-SCNC: 3.4 MMOL/L (ref 3.4–5.1)
Q ONSET: 216 MS
QRS COUNT: 21 BEATS
QRS DURATION: 110 MS
QT INTERVAL: 330 MS
QTC CALCULATION(BAZETT): 476 MS
QTC FREDERICIA: 421 MS
R AXIS: 5 DEGREES
RBC # BLD AUTO: 2.99 X10*6/UL (ref 4–5.2)
SODIUM SERPL-SCNC: 142 MMOL/L (ref 133–145)
T AXIS: 199 DEGREES
T OFFSET: 381 MS
VANCOMYCIN SERPL-MCNC: 15.1 UG/ML (ref 10–20)
VENTRICULAR RATE: 125 BPM
WBC # BLD AUTO: 10.2 X10*3/UL (ref 4.4–11.3)

## 2023-10-18 PROCEDURE — 93005 ELECTROCARDIOGRAM TRACING: CPT

## 2023-10-18 PROCEDURE — 80202 ASSAY OF VANCOMYCIN: CPT | Performed by: INTERNAL MEDICINE

## 2023-10-18 PROCEDURE — 2500000004 HC RX 250 GENERAL PHARMACY W/ HCPCS (ALT 636 FOR OP/ED): Performed by: NURSE PRACTITIONER

## 2023-10-18 PROCEDURE — 2500000001 HC RX 250 WO HCPCS SELF ADMINISTERED DRUGS (ALT 637 FOR MEDICARE OP): Performed by: INTERNAL MEDICINE

## 2023-10-18 PROCEDURE — 2500000002 HC RX 250 W HCPCS SELF ADMINISTERED DRUGS (ALT 637 FOR MEDICARE OP, ALT 636 FOR OP/ED): Performed by: INTERNAL MEDICINE

## 2023-10-18 PROCEDURE — 80048 BASIC METABOLIC PNL TOTAL CA: CPT | Performed by: NURSE PRACTITIONER

## 2023-10-18 PROCEDURE — 2500000004 HC RX 250 GENERAL PHARMACY W/ HCPCS (ALT 636 FOR OP/ED): Performed by: INTERNAL MEDICINE

## 2023-10-18 PROCEDURE — 85027 COMPLETE CBC AUTOMATED: CPT | Performed by: NURSE PRACTITIONER

## 2023-10-18 PROCEDURE — 82947 ASSAY GLUCOSE BLOOD QUANT: CPT

## 2023-10-18 PROCEDURE — 94640 AIRWAY INHALATION TREATMENT: CPT

## 2023-10-18 PROCEDURE — 96372 THER/PROPH/DIAG INJ SC/IM: CPT | Performed by: INTERNAL MEDICINE

## 2023-10-18 PROCEDURE — 9420000001 HC RT PATIENT EDUCATION 5 MIN

## 2023-10-18 PROCEDURE — 1200000002 HC GENERAL ROOM WITH TELEMETRY DAILY

## 2023-10-18 PROCEDURE — 2500000005 HC RX 250 GENERAL PHARMACY W/O HCPCS: Performed by: INTERNAL MEDICINE

## 2023-10-18 RX ORDER — VANCOMYCIN 1.75 G/350ML
1250 INJECTION, SOLUTION INTRAVENOUS EVERY 24 HOURS
Status: DISCONTINUED | OUTPATIENT
Start: 2023-10-19 | End: 2023-10-21 | Stop reason: DRUGHIGH

## 2023-10-18 RX ORDER — DILTIAZEM HYDROCHLORIDE 5 MG/ML
15 INJECTION INTRAVENOUS ONCE
Status: COMPLETED | OUTPATIENT
Start: 2023-10-18 | End: 2023-10-18

## 2023-10-18 RX ORDER — CHOLECALCIFEROL (VITAMIN D3) 50 MCG
2000 TABLET ORAL DAILY
Status: DISCONTINUED | OUTPATIENT
Start: 2023-10-19 | End: 2023-10-25 | Stop reason: HOSPADM

## 2023-10-18 RX ORDER — DILTIAZEM HCL/D5W 125 MG/125
10 PLASTIC BAG, INJECTION (ML) INTRAVENOUS CONTINUOUS
Status: DISCONTINUED | OUTPATIENT
Start: 2023-10-18 | End: 2023-10-20

## 2023-10-18 RX ADMIN — OXYCODONE HYDROCHLORIDE 5 MG: 5 TABLET ORAL at 10:43

## 2023-10-18 RX ADMIN — OXYBUTYNIN CHLORIDE 5 MG: 5 TABLET ORAL at 22:19

## 2023-10-18 RX ADMIN — VANCOMYCIN 1 G: 1 INJECTION, SOLUTION INTRAVENOUS at 06:11

## 2023-10-18 RX ADMIN — PIPERACILLIN SODIUM AND TAZOBACTAM SODIUM 4.5 G: 4; .5 INJECTION, SOLUTION INTRAVENOUS at 02:25

## 2023-10-18 RX ADMIN — PIPERACILLIN SODIUM AND TAZOBACTAM SODIUM 4.5 G: 4; .5 INJECTION, SOLUTION INTRAVENOUS at 10:26

## 2023-10-18 RX ADMIN — OXYBUTYNIN CHLORIDE 5 MG: 5 TABLET ORAL at 10:23

## 2023-10-18 RX ADMIN — IPRATROPIUM BROMIDE AND ALBUTEROL SULFATE 3 ML: 2.5; .5 SOLUTION RESPIRATORY (INHALATION) at 07:24

## 2023-10-18 RX ADMIN — KETOROLAC TROMETHAMINE 1 DROP: 0.5 SOLUTION OPHTHALMIC at 10:25

## 2023-10-18 RX ADMIN — APIXABAN 2.5 MG: 2.5 TABLET, FILM COATED ORAL at 22:19

## 2023-10-18 RX ADMIN — PANTOPRAZOLE SODIUM 40 MG: 40 TABLET, DELAYED RELEASE ORAL at 06:11

## 2023-10-18 RX ADMIN — APIXABAN 2.5 MG: 2.5 TABLET, FILM COATED ORAL at 10:23

## 2023-10-18 RX ADMIN — ASPIRIN 81 MG CHEWABLE TABLET 81 MG: 81 TABLET CHEWABLE at 10:24

## 2023-10-18 RX ADMIN — IRON SUCROSE 200 MG: 20 INJECTION, SOLUTION INTRAVENOUS at 21:00

## 2023-10-18 RX ADMIN — Medication 324 MG: at 18:55

## 2023-10-18 RX ADMIN — REMDESIVIR 100 MG: 100 INJECTION, POWDER, LYOPHILIZED, FOR SOLUTION INTRAVENOUS at 22:19

## 2023-10-18 RX ADMIN — REMDESIVIR 100 MG: 100 INJECTION, POWDER, LYOPHILIZED, FOR SOLUTION INTRAVENOUS at 00:51

## 2023-10-18 RX ADMIN — DILTIAZEM HYDROCHLORIDE 15 MG: 5 INJECTION INTRAVENOUS at 01:16

## 2023-10-18 RX ADMIN — ROSUVASTATIN CALCIUM 20 MG: 20 TABLET, COATED ORAL at 10:23

## 2023-10-18 RX ADMIN — Medication 324 MG: at 10:30

## 2023-10-18 RX ADMIN — OXYCODONE HYDROCHLORIDE AND ACETAMINOPHEN 500 MG: 500 TABLET ORAL at 10:30

## 2023-10-18 RX ADMIN — DORZOLAMIDE HYDROCHLORIDE 1 DROP: 20 SOLUTION/ DROPS OPHTHALMIC at 10:25

## 2023-10-18 RX ADMIN — OXYCODONE HYDROCHLORIDE 5 MG: 5 TABLET ORAL at 00:51

## 2023-10-18 RX ADMIN — LATANOPROST 1 DROP: 50 SOLUTION OPHTHALMIC at 21:00

## 2023-10-18 RX ADMIN — POLYETHYLENE GLYCOL 3350 17 G: 17 POWDER, FOR SOLUTION ORAL at 10:25

## 2023-10-18 RX ADMIN — DEXAMETHASONE 6 MG: 6 TABLET ORAL at 18:54

## 2023-10-18 RX ADMIN — DORZOLAMIDE HYDROCHLORIDE 1 DROP: 20 SOLUTION/ DROPS OPHTHALMIC at 21:00

## 2023-10-18 RX ADMIN — KETOROLAC TROMETHAMINE 1 DROP: 0.5 SOLUTION OPHTHALMIC at 21:00

## 2023-10-18 RX ADMIN — DEXAMETHASONE 6 MG: 6 TABLET ORAL at 10:23

## 2023-10-18 RX ADMIN — INSULIN LISPRO 3 UNITS: 100 INJECTION, SOLUTION INTRAVENOUS; SUBCUTANEOUS at 18:49

## 2023-10-18 RX ADMIN — Medication 10 MG/HR: at 01:28

## 2023-10-18 RX ADMIN — PIPERACILLIN SODIUM AND TAZOBACTAM SODIUM 4.5 G: 4; .5 INJECTION, SOLUTION INTRAVENOUS at 18:48

## 2023-10-18 RX ADMIN — KETOROLAC TROMETHAMINE 1 DROP: 0.5 SOLUTION OPHTHALMIC at 18:47

## 2023-10-18 ASSESSMENT — COGNITIVE AND FUNCTIONAL STATUS - GENERAL
HELP NEEDED FOR BATHING: A LOT
STANDING UP FROM CHAIR USING ARMS: A LOT
DAILY ACTIVITIY SCORE: 16
WALKING IN HOSPITAL ROOM: A LOT
DRESSING REGULAR LOWER BODY CLOTHING: A LOT
CLIMB 3 TO 5 STEPS WITH RAILING: A LOT
DRESSING REGULAR UPPER BODY CLOTHING: A LITTLE
CLIMB 3 TO 5 STEPS WITH RAILING: A LOT
STANDING UP FROM CHAIR USING ARMS: A LOT
TOILETING: A LOT
HELP NEEDED FOR BATHING: A LITTLE
TURNING FROM BACK TO SIDE WHILE IN FLAT BAD: A LITTLE
TOILETING: A LOT
WALKING IN HOSPITAL ROOM: A LOT
PERSONAL GROOMING: A LITTLE
MOVING TO AND FROM BED TO CHAIR: A LOT
TURNING FROM BACK TO SIDE WHILE IN FLAT BAD: A LITTLE
MOVING TO AND FROM BED TO CHAIR: A LOT
MOBILITY SCORE: 15
MOBILITY SCORE: 15
DAILY ACTIVITIY SCORE: 19
DRESSING REGULAR LOWER BODY CLOTHING: A LOT

## 2023-10-18 ASSESSMENT — PAIN - FUNCTIONAL ASSESSMENT: PAIN_FUNCTIONAL_ASSESSMENT: 0-10

## 2023-10-18 ASSESSMENT — PAIN SCALES - GENERAL
PAINLEVEL_OUTOF10: 5 - MODERATE PAIN
PAINLEVEL_OUTOF10: 8

## 2023-10-18 NOTE — CARE PLAN
Problem: Fall/Injury  Goal: Not fall by end of shift  Outcome: Progressing  Goal: Be free from injury by end of the shift  Outcome: Progressing  Goal: Verbalize understanding of personal risk factors for fall in the hospital  Outcome: Progressing  Goal: Verbalize understanding of risk factor reduction measures to prevent injury from fall in the home  Outcome: Progressing  Goal: Use assistive devices by end of the shift  Outcome: Progressing  Goal: Pace activities to prevent fatigue by end of the shift  Outcome: Progressing     Problem: Pain  Goal: My pain/discomfort is manageable  Outcome: Progressing     Problem: Safety  Goal: Patient will be injury free during hospitalization  Outcome: Progressing  Goal: I will remain free of falls  Outcome: Progressing     Problem: Daily Care  Goal: Daily care needs are met  Outcome: Progressing     Problem: Skin  Goal: Decreased wound size/increased tissue granulation at next dressing change  Outcome: Progressing  Goal: Participates in plan/prevention/treatment measures  Outcome: Progressing  Goal: Prevent/manage excess moisture  Outcome: Progressing  Goal: Prevent/minimize sheer/friction injuries  Outcome: Progressing  Goal: Promote/optimize nutrition  Outcome: Progressing  Goal: Promote skin healing  Outcome: Progressing   The patient's goals for the shift include pain management    The clinical goals for the shift include stable VS    Plan of care ongoing

## 2023-10-18 NOTE — NURSING NOTE
Pt noted to be in Afib on monitor. Dr Ríos was notified via phone and secure chat of change in rhythm. Pt is sitting in bed, denies chest pain, SOB or palpitations. HR 120s-150. /106. Pt does report pain to left foot and is repositioned per request. Pt has PRN oxycodone ordered which is not available at this time and pt states she will wait for next dose and declines further intervention at this time. Continuing to monitor

## 2023-10-18 NOTE — PROGRESS NOTES
Samira López is a 82 y.o. female on day 3 of admission presenting with Acute respiratory failure (CMS/HCC).    Spoke with nursing patient went into rapid atrial fibrillation last night, placed on Cardizem drip, since discontinued    Subjective   Patient seen and examined.  Resting in bed in no acute distress.  Awake alert oriented x 3.  Left foot pain overnight went into rapid atrial fibrillation asymptomatic, pain since controlled.  No shortness of breath, cough, chest pain.  No fevers, chills or sweats.  No bowel movements    Objective     Physical Exam  Vitals reviewed.   Constitutional:       General: She is not in acute distress.     Appearance: She is not ill-appearing, toxic-appearing or diaphoretic.   HENT:      Head: Normocephalic and atraumatic.      Right Ear: Tympanic membrane normal.      Left Ear: Tympanic membrane normal.      Nose: Nose normal.      Mouth/Throat:      Mouth: Mucous membranes are moist.      Pharynx: Oropharynx is clear.   Eyes:      Extraocular Movements: Extraocular movements intact.      Conjunctiva/sclera: Conjunctivae normal.      Pupils: Pupils are equal, round, and reactive to light.   Cardiovascular:      Rate and Rhythm: Normal rate and regular rhythm.      Pulses: Normal pulses.      Heart sounds: Normal heart sounds.   Pulmonary:      Effort: Pulmonary effort is normal. No respiratory distress.      Breath sounds: Normal breath sounds. No wheezing, rhonchi or rales.      Comments: On 3 liters nasal cannula  Abdominal:      General: Bowel sounds are normal. There is no distension.      Palpations: Abdomen is soft.      Tenderness: There is no abdominal tenderness.   Genitourinary:     Comments: /rectal examination deferred  Musculoskeletal:         General: Swelling present. Normal range of motion.      Cervical back: Normal range of motion and neck supple.      Comments: Left lower extremity unna boot, left foot dressing clean dry intact   Skin:     General: Skin is  "warm and dry.      Capillary Refill: Capillary refill takes less than 2 seconds.      Comments: Left lower extremity unna boot in place, left foot dressing clean dry intact   Neurological:      General: No focal deficit present.      Mental Status: She is alert and oriented to person, place, and time.   Psychiatric:         Mood and Affect: Mood normal.         Behavior: Behavior normal.       Last Recorded Vitals  Blood pressure 97/58, pulse 76, temperature 36.4 °C (97.5 °F), temperature source Temporal, resp. rate 18, height 1.791 m (5' 10.5\"), weight 81.3 kg (179 lb 3.7 oz), SpO2 95 %.    Intake/Output last 3 Shifts:  I/O last 3 completed shifts:  In: 500.2 (6.2 mL/kg) [P.O.:120; I.V.:30.2 (0.4 mL/kg); IV Piggyback:350]  Out: - (0 mL/kg)   Weight: 81.3 kg     Telemetry normal sinus rhythm     Relevant Results  Results for orders placed or performed during the hospital encounter of 10/15/23 (from the past 24 hour(s))   POCT GLUCOSE   Result Value Ref Range    POCT Glucose 180 (H) 74 - 99 mg/dL   Vancomycin   Result Value Ref Range    Vancomycin 15.1 10.0 - 20.0 ug/mL   Basic Metabolic Panel   Result Value Ref Range    Glucose 169 (H) 65 - 99 mg/dL    Sodium 142 133 - 145 mmol/L    Potassium 3.4 3.4 - 5.1 mmol/L    Chloride 105 97 - 107 mmol/L    Bicarbonate 27 24 - 31 mmol/L    Urea Nitrogen 19 8 - 25 mg/dL    Creatinine 0.70 0.40 - 1.60 mg/dL    eGFR 87 >60 mL/min/1.73m*2    Calcium 9.3 8.5 - 10.4 mg/dL    Anion Gap 10 <=19 mmol/L   CBC   Result Value Ref Range    WBC 10.2 4.4 - 11.3 x10*3/uL    nRBC 0.0 0.0 - 0.0 /100 WBCs    RBC 2.99 (L) 4.00 - 5.20 x10*6/uL    Hemoglobin 9.0 (L) 12.0 - 16.0 g/dL    Hematocrit 28.7 (L) 36.0 - 46.0 %    MCV 96 80 - 100 fL    MCH 30.1 26.0 - 34.0 pg    MCHC 31.4 (L) 32.0 - 36.0 g/dL    RDW 16.9 (H) 11.5 - 14.5 %    Platelets 211 150 - 450 x10*3/uL    MPV 10.7 7.5 - 11.5 fL   POCT GLUCOSE   Result Value Ref Range    POCT Glucose 147 (H) 74 - 99 mg/dL   POCT GLUCOSE   Result Value " Ref Range    POCT Glucose 137 (H) 74 - 99 mg/dL   Electrocardiogram, 12-lead PRN ACS symptoms   Result Value Ref Range    Ventricular Rate 125 BPM    Atrial Rate 147 BPM    QRS Duration 110 ms    QT Interval 330 ms    QTC Calculation(Bazett) 476 ms    R Axis 5 degrees    T Axis 199 degrees    QRS Count 21 beats    Q Onset 216 ms    T Offset 381 ms    QTC Fredericia 421 ms   POCT GLUCOSE   Result Value Ref Range    POCT Glucose 192 (H) 74 - 99 mg/dL     Electrocardiogram, 12-lead PRN ACS symptoms    Result Date: 10/18/2023  Atrial fibrillation with premature ventricular or aberrantly conducted complexes Anteroseptal infarct , age undetermined Marked ST abnormality, possible inferolateral subendocardial injury Abnormal ECG When compared with ECG of 24-APR-2020 20:07, Significant changes have occurred Confirmed by Jorge Santiago ([id number]) on 10/18/2023 12:15:26 PM     Scheduled medications  apixaban, 2.5 mg, oral, BID  ascorbic acid, 500 mg, oral, Daily with breakfast  aspirin, 81 mg, oral, Daily  [START ON 10/19/2023] cholecalciferol, 2,000 Units, oral, Daily  dexAMETHasone, 6 mg, oral, BID after meals  dorzolamide, 1 drop, Both Eyes, BID  exemestane, 25 mg, oral, Daily  ferrous gluconate, 324 mg, oral, BID after meals  insulin lispro, 0-15 Units, subcutaneous, TID with meals  ipratropium-albuteroL, 3 mL, nebulization, TID  iron sucrose, 200 mg, intravenous, Nightly  ketorolac, 1 drop, Both Eyes, 4x daily  latanoprost, 1 drop, Both Eyes, Nightly  lidocaine, 1 mL, infiltration, Once  metoprolol succinate XL, 50 mg, oral, Daily  oxybutynin, 5 mg, oral, BID  pantoprazole, 40 mg, oral, Daily before breakfast  piperacillin-tazobactam, 4.5 g, intravenous, q8h  polyethylene glycol, 17 g, oral, Daily  remdesivir, 100 mg, intravenous, q24h  rosuvastatin, 20 mg, oral, Daily  sodium hypochlorite, , irrigation, Daily  [START ON 10/19/2023] vancomycin-diluent combo no.1, 1,250 mg, intravenous, q24h      Continuous  medications  dilTIAZem, 10 mg/hr, Last Rate: 10 mg/hr (10/18/23 6858)  oxygen, , Last Rate: 3 L/min (10/17/23 1115)      PRN medications  PRN medications: acetaminophen **OR** acetaminophen **OR** acetaminophen, acetaminophen, alteplase, benzocaine-menthol, dextromethorphan-guaifenesin, dextrose 10 % in water (D10W), dextrose, diphenhydrAMINE, glucagon, guaiFENesin, oxyCODONE, polyethylene glycol, tiZANidine    ASSESSMENT:  Generalized weakness  Asthenia  Recurrent falls  COVID-19  Acute on chronic hypoxic respiratory failure  History of tobacco dependence  Left foot cellulitis, diabetic wound Pseudomonas  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  Normal sinus rhythm.  Continue beta-blocker Toprol XL 50 milligrams p.o daily.  Continue oral anticoagulation Eliquis for stroke prevention from atrial fibrillation.  Process compos extremity remained stable on 3 L of the cannula.  Continue IV Remdesivir.  P.o. Dexamethasone.  Oxygen.  Pulmonary hygiene.  Use incentive spirometer 10 times per hour while awake.  Isolation precautions.  Consultations Pulmonology, Infectious disease, Podiatry input appreciated.  Left lower extremity Unna boot in place.  Left foot wound dressing per Podiatry.  Dressing changes 3 times weekly.  Cultures growing Pseudomonas. A, pending.  Continue IV antibiotics per Infectious disease Vancomycin (pharmacy dosing), Zosyn via PICC line.  Follow-up cultures.  Monitor temperature and white blood cell count.  ESR, CRP elevated, noted.  Monitor BMP and CBC.  Chronic anemia, iron deficiency.  No evidence of acute blood loss.  Guaiac stools.  IV Venofer.  PPI.  Monitor point  of care glucose AC/HS.  ADA diet.  Sliding scale insulin.  Continue home medications as prescribed as appropriate.  PT/OT.  Fall precautions.  Up with assistance.  Deep vein thrombosis prophylaxis Eliquis.  Supportive care.  Patient reassured.  Case management following for discharge planning.  Discharge plan to skilled nursing rehabilitation.  Awaiting arrangements.  Discussed with Dr. Ríos.    lOivia Parrish, APRSANDRA-CNP

## 2023-10-18 NOTE — CARE PLAN
Problem: Respiratory  Goal: No signs of respiratory distress (eg. Use of accessory muscles. Peds grunting)  Outcome: Progressing     Problem: Respiratory  Goal: Patent airway maintained this shift  Outcome: Progressing

## 2023-10-18 NOTE — PROGRESS NOTES
"Physical Therapy                 Therapy Communication Note    Patient Name: Samira López  MRN: [id number]  Today's Date: 10/18/2023     Discipline: Physical Therapy    Missed Visit Reason: Missed Visit Reason: Parent refused    Missed Time: Attempt    Comment: RN requesting BP taken prior to tx. Upon arrival pt adamantly declined participation prior to BP taken stating, \"I'm going to  out today I need to rest.\" After encouragement pt agreeable with BP taken 157/137 (146). RN notified.   "

## 2023-10-18 NOTE — PROGRESS NOTES
Occupational Therapy                 Therapy Communication Note    Patient Name: Samira López  MRN: [id number]  Today's Date: 10/18/2023     Discipline: Occupational Therapy    Missed Visit Reason: Missed Visit Reason: Patient refused (Pt refusing any/all therapy this afternoon stating she wants to just rest. Important to note that pt had high BP also this afternoon.)    Missed Time: Attempt     (1) obesity (BMI greater than 25)

## 2023-10-18 NOTE — PROGRESS NOTES
Samira López is a 82 y.o. female on day 2 of admission presenting with Acute respiratory failure (CMS/HCC).    Subjective   Patient seen and examined.  Resting sitting up in the chair in no acute distress.  Awake alert oriented x 3.  Left foot pain controlled.   No other complaints.  Spoke with spouse over the phone discussing discharge plan states she does feel she and her spouse will discharge to skilled nursing rehabilitation prefers not to return to [facility]    Objective   Isolation precautions in place  Physical Exam  Vitals reviewed.   Constitutional:       Appearance: She is obese.   HENT:      Head: Normocephalic and atraumatic.      Right Ear: Tympanic membrane normal.      Left Ear: Tympanic membrane normal.      Nose: Nose normal.      Mouth/Throat:      Mouth: Mucous membranes are moist.      Pharynx: Oropharynx is clear.   Eyes:      Extraocular Movements: Extraocular movements intact.      Conjunctiva/sclera: Conjunctivae normal.      Pupils: Pupils are equal, round, and reactive to light.   Cardiovascular:      Rate and Rhythm: Normal rate and regular rhythm.      Pulses: Normal pulses.      Heart sounds: Normal heart sounds.   Pulmonary:      Effort: Pulmonary effort is normal. No respiratory distress.      Breath sounds: No wheezing, rhonchi or rales.      Comments: On 3 liters nasal cannula  Abdominal:      General: Bowel sounds are normal.      Palpations: Abdomen is soft.   Genitourinary:     Comments: /rectal examination deferred  Musculoskeletal:         General: Swelling present. Normal range of motion.      Cervical back: Normal range of motion and neck supple.   Skin:     General: Skin is warm and dry.      Capillary Refill: Capillary refill takes less than 2 seconds.      Comments: Left lower extremity boot in place, left foot dressing intact   Neurological:      General: No focal deficit present.      Mental Status: She is alert and oriented to person, place, and time.  "  Psychiatric:         Mood and Affect: Mood normal.         Behavior: Behavior normal.       Last Recorded Vitals  Blood pressure (!) 112/93, pulse 72, temperature 36.6 °C (97.9 °F), temperature source Temporal, resp. rate 19, height 1.791 m (5' 10.5\"), weight 81.3 kg (179 lb 3.7 oz), SpO2 95 %.    Intake/Output last 3 Shifts:  I/O last 3 completed shifts:  In: 240 (3 mL/kg) [P.O.:240]  Out: 401 (4.9 mL/kg) [Urine:400 (0.1 mL/kg/hr); Stool:1]  Weight: 81.3 kg     Left foot wound culture reviewed, pending    Relevant Results  Results for orders placed or performed during the hospital encounter of 10/15/23 (from the past 24 hour(s))   Vancomycin   Result Value Ref Range    Vancomycin 18.4 10.0 - 20.0 ug/mL   Basic Metabolic Panel   Result Value Ref Range    Glucose 185 (H) 65 - 99 mg/dL    Sodium 139 133 - 145 mmol/L    Potassium 4.1 3.4 - 5.1 mmol/L    Chloride 104 97 - 107 mmol/L    Bicarbonate 25 24 - 31 mmol/L    Urea Nitrogen 21 8 - 25 mg/dL    Creatinine 0.80 0.40 - 1.60 mg/dL    eGFR 74 >60 mL/min/1.73m*2    Calcium 9.4 8.5 - 10.4 mg/dL    Anion Gap 10 <=19 mmol/L   CBC   Result Value Ref Range    WBC 10.1 4.4 - 11.3 x10*3/uL    nRBC 0.0 0.0 - 0.0 /100 WBCs    RBC 3.06 (L) 4.00 - 5.20 x10*6/uL    Hemoglobin 9.0 (L) 12.0 - 16.0 g/dL    Hematocrit 29.6 (L) 36.0 - 46.0 %    MCV 97 80 - 100 fL    MCH 29.4 26.0 - 34.0 pg    MCHC 30.4 (L) 32.0 - 36.0 g/dL    RDW 17.2 (H) 11.5 - 14.5 %    Platelets 203 150 - 450 x10*3/uL    MPV 11.2 7.5 - 11.5 fL   POCT GLUCOSE   Result Value Ref Range    POCT Glucose 172 (H) 74 - 99 mg/dL   POCT GLUCOSE   Result Value Ref Range    POCT Glucose 225 (H) 74 - 99 mg/dL   POCT GLUCOSE   Result Value Ref Range    POCT Glucose 193 (H) 74 - 99 mg/dL   POCT GLUCOSE   Result Value Ref Range    POCT Glucose 180 (H) 74 - 99 mg/dL     No results found.    Scheduled medications  apixaban, 2.5 mg, oral, BID  ascorbic acid, 500 mg, oral, Daily with breakfast  aspirin, 81 mg, oral, " Daily  cholecalciferol, 50 mcg, oral, Daily  dexAMETHasone, 6 mg, oral, BID after meals  dorzolamide, 1 drop, Both Eyes, BID  exemestane, 25 mg, oral, Daily  ferrous gluconate, 324 mg, oral, BID after meals  insulin lispro, 0-15 Units, subcutaneous, TID with meals  ipratropium-albuteroL, 3 mL, nebulization, TID  iron sucrose, 200 mg, intravenous, Nightly  ketorolac, 1 drop, Both Eyes, 4x daily  latanoprost, 1 drop, Both Eyes, Nightly  lidocaine, 1 mL, infiltration, Once  metoprolol succinate XL, 50 mg, oral, Daily  oxybutynin, 5 mg, oral, BID  pantoprazole, 40 mg, oral, Daily before breakfast  piperacillin-tazobactam, 4.5 g, intravenous, q8h  polyethylene glycol, 17 g, oral, Daily  remdesivir, 100 mg, intravenous, q24h  rosuvastatin, 20 mg, oral, Daily  sodium hypochlorite, , irrigation, Daily  [START ON 10/18/2023] vancomycin-diluent combo no.1, 1 g, intravenous, q24h      Continuous medications  oxygen, , Last Rate: 3 L/min (10/17/23 1115)      PRN medications  PRN medications: acetaminophen **OR** acetaminophen **OR** acetaminophen, acetaminophen, alteplase, benzocaine-menthol, dextromethorphan-guaifenesin, dextrose 10 % in water (D10W), dextrose, diphenhydrAMINE, glucagon, guaiFENesin, oxyCODONE, polyethylene glycol, tiZANidine    ASSESSMENT:  Generalized weakness  Asthenia  Recurrent falls  COVID-19  Acute on chronic hypoxic respiratory failure  History of tobacco dependence  Left foot cellulitis, diabetic wound pending Pseudomonas  History of left foot osteomyelitis, status post transmetatarsal amputation  Peripheral vascular disease  Peripheral arterial disease  Type 2 diabetes mellitus  Iron deficiency anemia  Breast carcinoma  Anxiety  Hypotension    PLAN:  Patient is doing well this afternoon.  No new issues.  Respiratory status, pulse oximetry stable on baseline oxygen 3 liters nasal cannula.  Continue IV Remdesivir.  Dexamethasone.  Oxygen.  Pulmonary hygiene.  Use incentive spirometer 10 times per hour  while awake.  Isolation precautions.  Consultations Pulmonology, Infectious disease, Podiatry input appreciated.  Left lower extremity Unna boot.  Left foot wound dressing per Podiatry.  Dressing changes three times weekly.  Cultures pending Pseudomonas.  Continue broad-spectrum IV antibiotics Vancomycin (pharmacy dosing), Zosyn per Infectious disease via PICC line.  Follow-up culture results.  Monitor temperature and white blood cell count.  ESR, CRP noted.  Monitor.  Monitor BMP and CBC.  Chronic anemia, iron deficiency.  No evidence of acute blood loss.  No bowel movements since admission.  IV Venofer.  Guaiac stools.  PPI.  Labs reviewed.  Blood pressure noted.  Medications reviewed and updated.  PT/OT.  Fall precautions.  Up with assistance.  Bed to chair alarm at all times.  Pressure ulcer prevention measures.  DVT prophylaxis Eliquis.  Supportive care.  Patient reassured.  Case management following.  Anticipate discharge to skilled nursing rehabilitation.  Discussed with Dr. Ríos.      Olivia Parrish, APRSANDRA-CNP

## 2023-10-18 NOTE — PROGRESS NOTES
Samira López is a 82 y.o. female on day 3 of admission presenting with Acute respiratory failure (CMS/HCC).    Subjective   Interval History:   Room not entered-limit exposure  Patient was observed last week  Awake, alert  Resting comfortably   Wound culture growing Pseudomonas, pending       Review of Systems    Objective   Range of Vitals (last 24 hours)  Heart Rate:  []   Temp:  [36.1 °C (97 °F)-36.6 °C (97.9 °F)]   Resp:  [16-20]   BP: ()/()   SpO2:  [94 %-97 %]   Daily Weight  10/16/23 : 81.3 kg (179 lb 3.7 oz)    Body mass index is 25.35 kg/m².    Physical Exam  Awake, alert    Antibiotics  piperacillin-tazobactam-dextrose (Zosyn) IV 3.375 g  vancomycin-diluent combo no.1 (Xellia) IVPB 1,250 mg  acetaminophen (Tylenol) tablet 650 mg  furosemide (Lasix) injection 40 mg  dexAMETHasone (Decadron) injection 10 mg  amLODIPine (Norvasc) tablet 5 mg  apixaban (Eliquis) tablet 2.5 mg  clopidogrel (Plavix) tablet 75 mg  ketorolac (Acular) 0.5 % ophthalmic solution 1 drop  metoprolol succinate XL (Toprol-XL) 24 hr tablet 50 mg  pantoprazole (ProtoNix) EC tablet 40 mg  rosuvastatin (Crestor) tablet 20 mg  aspirin chewable tablet 81 mg  calcium carbonate (Tums) chewable tablet 500 mg  ferrous gluconate (Fergon) 324 (38 Fe) mg tablet 324 mg  omega 3-dha-epa-fish oil 1,200 (144-216) mg capsule 3,600 mg  furosemide (Lasix) tablet 40 mg  oxygen (O2) therapy  cholecalciferol (Vitamin D-3) capsule 50 mcg  biotin tablet 1,000 mcg  clobetasol (Temovate) 0.05 % ointment 1 Application  lisinopril tablet 20 mg  palbociclib (Ibrance) tablet 100 mg  sodium hypochlorite (Dakin's HALF-Strength) 0.25 % external solution  palbociclib (Ibrance) tablet 100 mg  acetaminophen (Tylenol) tablet 325 mg  ascorbic acid (Vitamin C) tablet 500 mg  diphenhydrAMINE (Sominex) tablet 25 mg  exemestane (Aromasin) tablet 25 mg  latanoprost (Xalatan) 0.005 % ophthalmic solution 1 drop  dorzolamide (Trusopt) 2 % ophthalmic solution 1  drop  cholecalciferol (Vitamin D-3) tablet 125 mcg  pantoprazole (ProtoNix) EC tablet 40 mg  oxybutynin (Ditropan) tablet 5 mg  palbociclib (Ibrance) capsule 125 mg  tiZANidine (Zanaflex) tablet 2 mg  piperacillin-tazobactam-dextrose (Zosyn) IV 4.5 g  ipratropium-albuteroL (Duo-Neb) 0.5-2.5 mg/3 mL nebulizer solution 3 mL  dexAMETHasone (Decadron) tablet 6 mg  remdesivir (Veklury) 200 mg in sodium chloride 0.9% 250 mL IV  remdesivir (Veklury) 100 mg in sodium chloride 0.9% 250 mL IV  vancomycin-diluent combo no.1 (Xellia) IVPB 750 mg  acetaminophen (Tylenol) tablet 650 mg  acetaminophen (Tylenol) oral liquid 650 mg  acetaminophen (Tylenol) suppository 650 mg  polyethylene glycol (Glycolax, Miralax) packet 17 g  benzocaine-menthol (Cepastat Sore Throat) 15-3.6 mg lozenge 1 lozenge  dextromethorphan-guaifenesin (Robitussin DM)  mg/5 mL oral liquid 5 mL  guaiFENesin (Mucinex) 12 hr tablet 600 mg  dextrose 50 % injection 25 g  glucagon (Glucagen) injection 1 mg  dextrose 10 % in water (D10W) infusion  insulin lispro (HumaLOG) injection 0-15 Units  piperacillin-tazobactam-dextrose (Zosyn) IV 4.5 g  ipratropium-albuteroL (Duo-Neb) 0.5-2.5 mg/3 mL nebulizer solution 3 mL  lidocaine (Xylocaine) 10 mg/mL (1 %) injection 10 mg  alteplase (Cathflo Activase) injection 2 mg      oxyCODONE (Roxicodone) immediate release tablet  polyethylene glycol (Glycolax, Miralax) packet  oxyCODONE (Roxicodone) immediate release tablet 5 mg  iron sucrose (Venofer) 200 mg in sodium chloride 0.9% 100 mL IVPB  iron sucrose (Venofer) injection 200 mg  oxygen (O2) therapy  vancomycin-diluent combo no.1 (Xellia) IVPB 1 g  polyethylene glycol (Glycolax, Miralax) packet 17 g  tiZANidine (Zanaflex) tablet 2 mg  dilTIAZem (Cardizem) 125 mg in dextrose 5% 125 mL (1 mg/mL) infusion (premix)  dilTIAZem (Cardizem) injection 15 mg  vancomycin-diluent combo no.1 (Xellia) IVPB 1,250 mg  cholecalciferol (Vitamin D-3) tablet 2,000 Units      Relevant  Results  Labs  Results from last 72 hours   Lab Units 10/18/23  0606 10/17/23  0556 10/16/23  0901   WBC AUTO x10*3/uL 10.2 10.1 12.3*   HEMOGLOBIN g/dL 9.0* 9.0* 9.4*   HEMATOCRIT % 28.7* 29.6* 26.6*   PLATELETS AUTO x10*3/uL 211 203 200     Results from last 72 hours   Lab Units 10/18/23  0606 10/17/23  0556 10/16/23  0901   SODIUM mmol/L 142 139 143   POTASSIUM mmol/L 3.4 4.1 4.2   CHLORIDE mmol/L 105 104 106   CO2 mmol/L 27 25 24   BUN mg/dL 19 21 17   CREATININE mg/dL 0.70 0.80 0.90   GLUCOSE mg/dL 169* 185* 173*   CALCIUM mg/dL 9.3 9.4 9.7   ANION GAP mmol/L 10 10 13   EGFR mL/min/1.73m*2 87 74 64         Estimated Creatinine Clearance: 69.4 mL/min (by C-G formula based on SCr of 0.7 mg/dL).  C-Reactive Protein   Date Value Ref Range Status   10/16/2023 5.90 (H) 0.00 - 2.00 mg/dL Final     CRP   Date Value Ref Range Status   09/09/2023 6.3 (H) 0 - 2.0 MG/DL Final     Comment:     Performed at [address] [address]   04/24/2020 0.18 mg/dL Final     Comment:     REF VALUE  < 1.00       Microbiology  Susceptibility data from last 14 days.  Collected Specimen Info Organism   10/16/23 Tissue/Biopsy from Diabetic Foot Ulcer Pseudomonas aeruginosa   10/15/23 Tissue/Biopsy from Wound/Tissue Pseudomonas aeruginosa     Mixed Gram-Positive and Gram-Negative Bacteria       Imaging  Reviewed  Assessment/Plan     Acute on chronic respiratory failure  Coronavirus infection  Marginal leukocytosis  Left diabetic foot ulcer-Albrecht grade 3  Possible left foot cellulitis-culture growing Pseudomonas, gram-positive cocci          Dexamethasone  Remdesivir  Supplemental oxygen as needed  Local care  Glycemic control  Continue vancomycin  Continue Zosyn  Follow-up wound culture, in progress  Blood cultures pending no growth 1 day  Supportive care  Podiatry following  Monitor temperature and WBC  Droplet plus precautions       Carlos Malone MD

## 2023-10-18 NOTE — CARE PLAN
Problem: Respiratory  Goal: No signs of respiratory distress (eg. Use of accessory muscles. Peds grunting)  Outcome: Progressing  Goal: Wean oxygen to maintain O2 saturation per order/standard this shift  Outcome: Progressing

## 2023-10-18 NOTE — PROGRESS NOTES
Samira López is a 82 y.o. female on day 3 of admission presenting with Acute respiratory failure (CMS/HCC).    Subjective   Patient seen bedside this morning.  Reports increased pain in the left foot and leg.  Noted to be in atrial fibrillation yesterday evening.  Currently noted to be in sinus rhythm on telemetry.  No acute events noted otherwise.       Physical Exam    Objective     REVIEW OF SYSTEMS  GENERAL:  Negative for malaise, significant weight loss, fever  HEENT:  No changes in hearing or vision, no nose bleeds or other nasal problems and Negative for frequent or significant headaches  NECK:  Negative for lumps, goiter, pain and significant neck swelling  RESPIRATORY:  Negative for cough, wheezing and shortness of breath  CARDIOVASCULAR:  Negative for chest pain, leg swelling and palpitations  GI:  Negative for abdominal discomfort, blood in stools or black stools and change in bowel habits  :  Negative for dysuria, frequency and incontinence  MUSCULOSKELETAL:  Pain in left foot  SKIN:  Multiple ulcerations to the left lower extremity  PSYCH:  Negative for sleep disturbance, mood disorder and recent psychosocial stressors  HEMATOLOGY/LYMPHOLOGY:  Negative for prolonged bleeding, bruising easily, and swollen nodes.  ENDOCRINE:  Negative for cold or heat intolerance, polyuria, polydipsia and goiter  NEURO: Negative, denies any burning, tingling or numbness      Physical Exam     Objective: Patient seen bedside.  Patient is alert aware and oriented x3.  She does not appear to be in acute distress.    Vasc: DP and PT pulses are faintly palpable. Skin is atrophic to the dorsum left foot. Hair growth absent.      Neuro:  sensation intact to left foot to light touch. Muscle strength +5/5.      Derm: Left foot status post transmetatarsal amputation.  The incision with Prolene suture intact.  This is well coapted.  There is no signs of ischemia or incisional dehiscence.  She does have several superficial  "venous stasis ulcerations to the dorsum foot, lateral foot, and anterior leg.  These all appear granular without significant depth.  There is findings consistent with chronic venous stasis dermatitis.   There is a posterior heel decubitus ulcer measuring roughly 1.1 cm x 1.5 cm.  The surrounding area appears to be developing unstageable pressure injury.  This appears worse today.  There is a mixed fibrogranular base.  No active purulent drainage.  Overall there are no significant signs of bacterial infection involving the left lower extremity.  Erythema is present secondary to dependent rubor and venous stasis dermatitis.  Low concern for ascending cellulitis or infectious process.     Ortho: Significant tenderness on palpation to the multiple ulcerations to the left lower extremity.  Status post transmetatarsal amputation.    Last Recorded Vitals  Blood pressure 97/67, pulse 67, temperature 36.6 °C (97.9 °F), temperature source Oral, resp. rate 16, height 1.791 m (5' 10.5\"), weight 81.3 kg (179 lb 3.7 oz), SpO2 95 %.    Intake/Output last 3 Shifts:  I/O last 3 completed shifts:  In: 500.2 (6.2 mL/kg) [P.O.:120; I.V.:30.2 (0.4 mL/kg); IV Piggyback:350]  Out: - (0 mL/kg)   Weight: 81.3 kg     Relevant Results           Assessment/Plan   Principal Problem:    Acute respiratory failure (CMS/HCC)  Active Problems:    Arthritis, multiple joint involvement    Balance disorder    Lumbar canal stenosis    Chronic diastolic congestive heart failure (CMS/HCC)    Dermatitis    Obstructive sleep apnea, adult    Peripheral polyneuropathy    Anemia due to chronic kidney disease    Cellulitis and abscess of lower extremity    Cellulitis of left lower limb    Cutaneous abscess of left lower limb    Malignant neoplasm of unspecified site of unspecified female breast (CMS/HCC)    NATALIYA (obstructive sleep apnea)    Personal history of nicotine dependence    Primary generalized (osteo)arthritis    Abnormal computed tomography scan    " Accidental fall    Anxiety    Asthenia    Chronic respiratory failure with hypoxia (CMS/HCC)    Diabetes mellitus type 2 in obese (CMS/HCC)    Gangrene of foot (CMS/HCC)    Dyspnea    I am managing patient's left lower extremity leg and foot ulcerations as well as transmetatarsal amputation site.  She is roughly 1 month out status post transmet amputation.  On examination today the incision appears well-healed.  Following Betadine prep and verbal consent all sutures were removed.  There is no evidence of dehiscence or delayed healing to the incision itself.  This appears healed today.      Previous swab culture obtained from the left heel ulceration Gram stain showing +3 gram-positive cocci.  Patient is tolerating IV antibiotics.      Thankfully on examination today there is no evidence of worsening bacterial infection, cellulitis, purulence, etc.    She has a large anterior ankle unstageable pressure injury as well as a venous ulcer to the dorsum of the left foot.  Worsening heel decubitus ulceration.  This was dressed today with calcium alginate to the base of the wound and heel protector dressing.  The anterior ankle and dorsum left foot were dressed with calcium alginate and a well-padded with ABD pads and lightly wrapped Kerlix.  Will hold off on compression today secondary to worsening evidence of pressure injuries. PRAFO boots at all times while in bed to bilateral lower extremities.  Okay for full weightbearing utilizing postop shoe to the left foot.    Anders Loera DPM

## 2023-10-18 NOTE — PROGRESS NOTES
"Vancomycin Dosing by Pharmacy- FOLLOW UP    Samira López is a 82 y.o. year old female who Pharmacy has been consulted for vancomycin dosing for cellulitis, skin and soft tissue. Based on the patient's indication and renal status this patient is being dosed based on a goal AUC of 400-600.     Renal function is currently stable.    Current vancomycin dose: 1000 mg given every 24 hours  Dose changed to 1250 mg given every 24 hours    Estimated vancomycin AUC on current dose: 398 mg/L.hr     Visit Vitals  BP 97/67 (BP Location: Left leg, Patient Position: Lying)   Pulse 67   Temp 36.6 °C (97.9 °F) (Oral)   Resp 16        Lab Results   Component Value Date    CREATININE 0.70 10/18/2023    CREATININE 0.80 10/17/2023    CREATININE 0.90 10/16/2023    CREATININE 0.80 10/15/2023        Patient weight is 81.3 kg    No results found for: \"CULTURE\"     I/O last 3 completed shifts:  In: 500.2 (6.2 mL/kg) [P.O.:120; I.V.:30.2 (0.4 mL/kg); IV Piggyback:350]  Out: - (0 mL/kg)   Weight: 81.3 kg   [unfilled]    Lab Results   Component Value Date    PATIENTTEMP 37.0 05/13/2023        Assessment/Plan    Below goal AUC. Orders placed for new vancomcyin regimen of 1250 mg every 24 hours to begin at 10/19 at 6:00 .     This dosing regimen is predicted by [employer] to result in the following pharmacokinetic parameters:  Loading dose: N/A  Regimen: 1250 mg IV every 24 hours.  Start time: 06:11 on 10/19/2023  Exposure target: AUC24 (range)400-600 mg/L.hr   AUC24,ss: 488 mg/L.hr  Probability of AUC24 > 400: 91 %  Ctrough,ss: 14 mg/L  Probability of Ctrough,ss > 20: 5 %  Probability of nephrotoxicity (Lodise REUBEN 2009): 9 %      The next level will be obtained on 10/20 at 5am. May be obtained sooner if clinically indicated.   Will continue to monitor renal function daily while on vancomycin and order serum creatinine at least every 48 hours if not already ordered.  Follow for continued vancomycin needs, clinical response, and " signs/symptoms of toxicity.       Ernesto Storm, PharmD

## 2023-10-18 NOTE — NURSING NOTE
Assumed care of this pt. Pt sitting in bed, breathing even and unlabored on 2L NC. NAD. Bed low, call light and belongings in reach. Continuing to monitor

## 2023-10-19 ENCOUNTER — HOSPITAL ENCOUNTER (OUTPATIENT)
Dept: RADIOLOGY | Facility: HOSPITAL | Age: 83
Discharge: HOME | End: 2023-10-19
Payer: MEDICARE

## 2023-10-19 DIAGNOSIS — C50.919 CARCINOMA OF BREAST METASTATIC TO BONE, UNSPECIFIED LATERALITY (MULTI): ICD-10-CM

## 2023-10-19 DIAGNOSIS — C79.51 CARCINOMA OF BREAST METASTATIC TO BONE, UNSPECIFIED LATERALITY (MULTI): ICD-10-CM

## 2023-10-19 LAB
ANION GAP SERPL CALC-SCNC: >19 MMOL/L
BACTERIA SPEC CULT: ABNORMAL
BACTERIA SPEC CULT: ABNORMAL
BUN SERPL-MCNC: 17 MG/DL (ref 8–25)
CALCIUM SERPL-MCNC: 7.9 MG/DL (ref 8.5–10.4)
CHLORIDE SERPL-SCNC: 94 MMOL/L (ref 97–107)
CO2 SERPL-SCNC: 21 MMOL/L (ref 24–31)
CREAT SERPL-MCNC: 0.7 MG/DL (ref 0.4–1.6)
ERYTHROCYTE [DISTWIDTH] IN BLOOD BY AUTOMATED COUNT: 17.3 % (ref 11.5–14.5)
GFR SERPL CREATININE-BSD FRML MDRD: 87 ML/MIN/1.73M*2
GLUCOSE BLD MANUAL STRIP-MCNC: 180 MG/DL (ref 74–99)
GLUCOSE BLD MANUAL STRIP-MCNC: 217 MG/DL (ref 74–99)
GLUCOSE BLD MANUAL STRIP-MCNC: 249 MG/DL (ref 74–99)
GLUCOSE SERPL-MCNC: 334 MG/DL (ref 65–99)
GRAM STN SPEC: ABNORMAL
GRAM STN SPEC: ABNORMAL
HCT VFR BLD AUTO: 25.9 % (ref 36–46)
HGB BLD-MCNC: 7.9 G/DL (ref 12–16)
MCH RBC QN AUTO: 29.8 PG (ref 26–34)
MCHC RBC AUTO-ENTMCNC: 30.5 G/DL (ref 32–36)
MCV RBC AUTO: 98 FL (ref 80–100)
NRBC BLD-RTO: 0 /100 WBCS (ref 0–0)
PLATELET # BLD AUTO: 168 X10*3/UL (ref 150–450)
PMV BLD AUTO: 10 FL (ref 7.5–11.5)
POTASSIUM SERPL-SCNC: 3.4 MMOL/L (ref 3.4–5.1)
RBC # BLD AUTO: 2.65 X10*6/UL (ref 4–5.2)
SODIUM SERPL-SCNC: 140 MMOL/L (ref 133–145)
STAPHYLOCOCCUS SPEC CULT: NORMAL
WBC # BLD AUTO: 6.1 X10*3/UL (ref 4.4–11.3)

## 2023-10-19 PROCEDURE — 2500000001 HC RX 250 WO HCPCS SELF ADMINISTERED DRUGS (ALT 637 FOR MEDICARE OP): Performed by: INTERNAL MEDICINE

## 2023-10-19 PROCEDURE — 85027 COMPLETE CBC AUTOMATED: CPT | Performed by: NURSE PRACTITIONER

## 2023-10-19 PROCEDURE — 2500000001 HC RX 250 WO HCPCS SELF ADMINISTERED DRUGS (ALT 637 FOR MEDICARE OP): Performed by: NURSE PRACTITIONER

## 2023-10-19 PROCEDURE — 94760 N-INVAS EAR/PLS OXIMETRY 1: CPT

## 2023-10-19 PROCEDURE — 3430000001 HC RX 343 DIAGNOSTIC RADIOPHARMACEUTICALS: Performed by: INTERNAL MEDICINE

## 2023-10-19 PROCEDURE — 2500000004 HC RX 250 GENERAL PHARMACY W/ HCPCS (ALT 636 FOR OP/ED): Performed by: INTERNAL MEDICINE

## 2023-10-19 PROCEDURE — 97530 THERAPEUTIC ACTIVITIES: CPT | Mod: GO,CO

## 2023-10-19 PROCEDURE — 82947 ASSAY GLUCOSE BLOOD QUANT: CPT

## 2023-10-19 PROCEDURE — 94640 AIRWAY INHALATION TREATMENT: CPT

## 2023-10-19 PROCEDURE — 80048 BASIC METABOLIC PNL TOTAL CA: CPT | Performed by: NURSE PRACTITIONER

## 2023-10-19 PROCEDURE — A9503 TC99M MEDRONATE: HCPCS | Performed by: INTERNAL MEDICINE

## 2023-10-19 PROCEDURE — 78306 BONE IMAGING WHOLE BODY: CPT | Mod: MG

## 2023-10-19 PROCEDURE — 9420000001 HC RT PATIENT EDUCATION 5 MIN

## 2023-10-19 PROCEDURE — 97535 SELF CARE MNGMENT TRAINING: CPT | Mod: GO,CO

## 2023-10-19 PROCEDURE — 2500000004 HC RX 250 GENERAL PHARMACY W/ HCPCS (ALT 636 FOR OP/ED): Performed by: NURSE PRACTITIONER

## 2023-10-19 PROCEDURE — 2500000005 HC RX 250 GENERAL PHARMACY W/O HCPCS: Performed by: INTERNAL MEDICINE

## 2023-10-19 PROCEDURE — 1200000002 HC GENERAL ROOM WITH TELEMETRY DAILY

## 2023-10-19 PROCEDURE — 2500000002 HC RX 250 W HCPCS SELF ADMINISTERED DRUGS (ALT 637 FOR MEDICARE OP, ALT 636 FOR OP/ED): Performed by: INTERNAL MEDICINE

## 2023-10-19 RX ADMIN — OXYBUTYNIN CHLORIDE 5 MG: 5 TABLET ORAL at 22:17

## 2023-10-19 RX ADMIN — IPRATROPIUM BROMIDE AND ALBUTEROL SULFATE 3 ML: 2.5; .5 SOLUTION RESPIRATORY (INHALATION) at 12:16

## 2023-10-19 RX ADMIN — OXYCODONE HYDROCHLORIDE AND ACETAMINOPHEN 500 MG: 500 TABLET ORAL at 10:04

## 2023-10-19 RX ADMIN — PIPERACILLIN SODIUM AND TAZOBACTAM SODIUM 4.5 G: 4; .5 INJECTION, SOLUTION INTRAVENOUS at 01:33

## 2023-10-19 RX ADMIN — DEXAMETHASONE 6 MG: 6 TABLET ORAL at 10:04

## 2023-10-19 RX ADMIN — DEXAMETHASONE 6 MG: 6 TABLET ORAL at 18:33

## 2023-10-19 RX ADMIN — PIPERACILLIN SODIUM AND TAZOBACTAM SODIUM 4.5 G: 4; .5 INJECTION, SOLUTION INTRAVENOUS at 18:33

## 2023-10-19 RX ADMIN — VANCOMYCIN 1250 MG: 1.75 INJECTION, SOLUTION INTRAVENOUS at 05:30

## 2023-10-19 RX ADMIN — EXEMESTANE 25 MG: 25 TABLET ORAL at 10:05

## 2023-10-19 RX ADMIN — ACETAMINOPHEN 650 MG: 325 TABLET ORAL at 10:29

## 2023-10-19 RX ADMIN — PIPERACILLIN SODIUM AND TAZOBACTAM SODIUM 4.5 G: 4; .5 INJECTION, SOLUTION INTRAVENOUS at 10:09

## 2023-10-19 RX ADMIN — POLYETHYLENE GLYCOL 3350 17 G: 17 POWDER, FOR SOLUTION ORAL at 10:08

## 2023-10-19 RX ADMIN — APIXABAN 2.5 MG: 2.5 TABLET, FILM COATED ORAL at 10:04

## 2023-10-19 RX ADMIN — ASPIRIN 81 MG CHEWABLE TABLET 81 MG: 81 TABLET CHEWABLE at 10:04

## 2023-10-19 RX ADMIN — KETOROLAC TROMETHAMINE 1 DROP: 0.5 SOLUTION OPHTHALMIC at 10:09

## 2023-10-19 RX ADMIN — OXYBUTYNIN CHLORIDE 5 MG: 5 TABLET ORAL at 10:04

## 2023-10-19 RX ADMIN — OXYCODONE HYDROCHLORIDE 5 MG: 5 TABLET ORAL at 10:29

## 2023-10-19 RX ADMIN — Medication 2000 UNITS: at 10:04

## 2023-10-19 RX ADMIN — TECHNETIUM TC 99M MEDRONATE 23 MILLICURIE: 25 INJECTION, POWDER, FOR SOLUTION INTRAVENOUS at 11:20

## 2023-10-19 RX ADMIN — IRON SUCROSE 200 MG: 20 INJECTION, SOLUTION INTRAVENOUS at 22:17

## 2023-10-19 RX ADMIN — LATANOPROST 1 DROP: 50 SOLUTION OPHTHALMIC at 21:00

## 2023-10-19 RX ADMIN — DORZOLAMIDE HYDROCHLORIDE 1 DROP: 20 SOLUTION/ DROPS OPHTHALMIC at 21:00

## 2023-10-19 RX ADMIN — KETOROLAC TROMETHAMINE 1 DROP: 0.5 SOLUTION OPHTHALMIC at 21:00

## 2023-10-19 RX ADMIN — APIXABAN 2.5 MG: 2.5 TABLET, FILM COATED ORAL at 22:17

## 2023-10-19 RX ADMIN — KETOROLAC TROMETHAMINE 1 DROP: 0.5 SOLUTION OPHTHALMIC at 18:34

## 2023-10-19 RX ADMIN — METOPROLOL SUCCINATE 50 MG: 50 TABLET, EXTENDED RELEASE ORAL at 10:04

## 2023-10-19 RX ADMIN — OXYCODONE HYDROCHLORIDE 5 MG: 5 TABLET ORAL at 22:40

## 2023-10-19 RX ADMIN — DORZOLAMIDE HYDROCHLORIDE 1 DROP: 20 SOLUTION/ DROPS OPHTHALMIC at 10:09

## 2023-10-19 RX ADMIN — IPRATROPIUM BROMIDE AND ALBUTEROL SULFATE 3 ML: 2.5; .5 SOLUTION RESPIRATORY (INHALATION) at 19:56

## 2023-10-19 RX ADMIN — REMDESIVIR 100 MG: 100 INJECTION, POWDER, LYOPHILIZED, FOR SOLUTION INTRAVENOUS at 22:17

## 2023-10-19 RX ADMIN — PANTOPRAZOLE SODIUM 40 MG: 40 TABLET, DELAYED RELEASE ORAL at 05:30

## 2023-10-19 RX ADMIN — KETOROLAC TROMETHAMINE 1 DROP: 0.5 SOLUTION OPHTHALMIC at 13:31

## 2023-10-19 RX ADMIN — IPRATROPIUM BROMIDE AND ALBUTEROL SULFATE 3 ML: 2.5; .5 SOLUTION RESPIRATORY (INHALATION) at 07:06

## 2023-10-19 RX ADMIN — ROSUVASTATIN CALCIUM 20 MG: 20 TABLET, COATED ORAL at 10:04

## 2023-10-19 RX ADMIN — Medication 324 MG: at 10:04

## 2023-10-19 RX ADMIN — Medication 324 MG: at 18:32

## 2023-10-19 ASSESSMENT — PAIN - FUNCTIONAL ASSESSMENT
PAIN_FUNCTIONAL_ASSESSMENT: 0-10

## 2023-10-19 ASSESSMENT — COGNITIVE AND FUNCTIONAL STATUS - GENERAL
MOBILITY SCORE: 18
STANDING UP FROM CHAIR USING ARMS: A LITTLE
HELP NEEDED FOR BATHING: A LOT
DAILY ACTIVITIY SCORE: 19
DRESSING REGULAR LOWER BODY CLOTHING: A LOT
TURNING FROM BACK TO SIDE WHILE IN FLAT BAD: A LITTLE
STANDING UP FROM CHAIR USING ARMS: A LITTLE
TOILETING: A LOT
WALKING IN HOSPITAL ROOM: A LITTLE
MOVING TO AND FROM BED TO CHAIR: A LITTLE
HELP NEEDED FOR BATHING: A LITTLE
CLIMB 3 TO 5 STEPS WITH RAILING: A LITTLE
CLIMB 3 TO 5 STEPS WITH RAILING: A LOT
DAILY ACTIVITIY SCORE: 17
TURNING FROM BACK TO SIDE WHILE IN FLAT BAD: A LITTLE
MOVING TO AND FROM BED TO CHAIR: A LITTLE
DRESSING REGULAR LOWER BODY CLOTHING: A LOT
MOBILITY SCORE: 17
DRESSING REGULAR UPPER BODY CLOTHING: A LITTLE
MOVING FROM LYING ON BACK TO SITTING ON SIDE OF FLAT BED WITH BEDRAILS: A LITTLE
DRESSING REGULAR UPPER BODY CLOTHING: A LITTLE
TOILETING: A LITTLE
WALKING IN HOSPITAL ROOM: A LOT

## 2023-10-19 ASSESSMENT — PAIN SCALES - GENERAL
PAINLEVEL_OUTOF10: 0 - NO PAIN
PAINLEVEL_OUTOF10: 2
PAINLEVEL_OUTOF10: 0 - NO PAIN
PAINLEVEL_OUTOF10: 0 - NO PAIN
PAINLEVEL_OUTOF10: 3
PAINLEVEL_OUTOF10: 1
PAINLEVEL_OUTOF10: 0 - NO PAIN

## 2023-10-19 ASSESSMENT — PAIN SCALES - WONG BAKER
WONGBAKER_NUMERICALRESPONSE: HURTS LITTLE BIT
WONGBAKER_NUMERICALRESPONSE: HURTS LITTLE BIT

## 2023-10-19 ASSESSMENT — PAIN DESCRIPTION - DESCRIPTORS: DESCRIPTORS: ACHING;SORE

## 2023-10-19 NOTE — NURSING NOTE
Patient assisted with set up of breakfast tray.  Awake and alert.  Pleasant mood.  Respirations even and unlabored on 2L oxygen.  Covid precautions in place.  Patient denies flu-like symptoms.  Prafo boots in place to bilat heels.  No complaints of pain/discomfort.  Call light and commonly used items in reach.  Bed locked and in low position.

## 2023-10-19 NOTE — PROGRESS NOTES
"Samira López is a 82 y.o. female on day 4 seen in follow-up for acute on chronic hypoxic respiratory failure.    Subjective   On 2 L nasal cannula oxygen; oxygen sats 94%. No respiratory complaints. Denies pain. Afebrile.       Objective     Physical Exam  Vitals and nursing note reviewed.   Constitutional:       Appearance: Normal appearance.   HENT:      Head: Normocephalic and atraumatic.      Nose: Nose normal.      Mouth/Throat:      Mouth: Mucous membranes are moist.   Eyes:      Extraocular Movements: Extraocular movements intact.      Conjunctiva/sclera: Conjunctivae normal.      Pupils: Pupils are equal, round, and reactive to light.   Cardiovascular:      Rate and Rhythm: Normal rate and regular rhythm.      Pulses: Normal pulses.      Heart sounds: Normal heart sounds.   Pulmonary:      Effort: Pulmonary effort is normal.      Comments: Lungs diminished but clear.  Abdominal:      General: Bowel sounds are normal.      Palpations: Abdomen is soft.   Musculoskeletal:         General: Normal range of motion.      Right lower leg: Edema present.      Left lower leg: Edema present.      Comments: Left lower extremity with PRAFO boot in place, right lower extremity with dry and intact dressing in place.   Skin:     General: Skin is warm and dry.      Capillary Refill: Capillary refill takes less than 2 seconds.   Neurological:      General: No focal deficit present.      Mental Status: She is alert and oriented to person, place, and time.   Psychiatric:         Mood and Affect: Mood normal.         Behavior: Behavior normal.         Last Recorded Vitals  Blood pressure 172/82, pulse 67, temperature 36.1 °C (97 °F), temperature source Temporal, resp. rate 18, height 1.791 m (5' 10.5\"), weight 84.6 kg (186 lb 8.2 oz), SpO2 94 %.  Intake/Output last 3 Shifts:  I/O last 3 completed shifts:  In: 500.2 (5.9 mL/kg) [P.O.:120; I.V.:30.2 (0.4 mL/kg); IV Piggyback:350]  Out: - (0 mL/kg)   Weight: 84.6 kg "     Relevant Results           This patient currently has cardiac telemetry ordered; if you would like to modify or discontinue the telemetry order, click here to go to the orders activity to modify/discontinue the order.      Current Facility-Administered Medications:     acetaminophen (Tylenol) tablet 650 mg, 650 mg, oral, q4h PRN, 650 mg at 10/19/23 1029 **OR** acetaminophen (Tylenol) oral liquid 650 mg, 650 mg, oral, q4h PRN, 650 mg at 10/15/23 2333 **OR** acetaminophen (Tylenol) suppository 650 mg, 650 mg, rectal, q4h PRN, Jaquan Ríos MD    acetaminophen (Tylenol) tablet 325 mg, 325 mg, oral, q6h PRN, Jaquan Ríos MD    alteplase (Cathflo Activase) injection 2 mg, 2 mg, intra-catheter, PRN, Carlos Malone MD    apixaban (Eliquis) tablet 2.5 mg, 2.5 mg, oral, BID, Jaquan Ríos MD, 2.5 mg at 10/19/23 1004    ascorbic acid (Vitamin C) tablet 500 mg, 500 mg, oral, Daily with breakfast, Jaquan Ríos MD, 500 mg at 10/19/23 1004    aspirin chewable tablet 81 mg, 81 mg, oral, Daily, Jaquan Ríos MD, 81 mg at 10/19/23 1004    benzocaine-menthol (Cepastat Sore Throat) 15-3.6 mg lozenge 1 lozenge, 1 lozenge, Mouth/Throat, q2h PRN, Jaquan Ríos MD    cholecalciferol (Vitamin D-3) tablet 2,000 Units, 2,000 Units, oral, Daily, Jaquan Ríos MD, 2,000 Units at 10/19/23 1004    dexAMETHasone (Decadron) tablet 6 mg, 6 mg, oral, BID after meals, Jaquan Ríos MD, 6 mg at 10/19/23 1004    dextromethorphan-guaifenesin (Robitussin DM)  mg/5 mL oral liquid 5 mL, 5 mL, oral, q4h PRN, Jaquan Ríos MD    dextrose 10 % in water (D10W) infusion, 0.3 g/kg/hr, intravenous, Once PRN, Jaquan Ríos MD    dextrose 50 % injection 25 g, 25 g, intravenous, q15 min PRN, Jaquan Ríos MD    dilTIAZem (Cardizem) 125 mg in dextrose 5% 125 mL (1 mg/mL) infusion (premix), 10 mg/hr, intravenous, Continuous, Jaquan Ríos MD, Last Rate: 10 mL/hr at 10/18/23 0429,  10 mg/hr at 10/18/23 0429    diphenhydrAMINE (Sominex) tablet 25 mg, 25 mg, oral, q6h PRN, Jaquan Ríos MD    dorzolamide (Trusopt) 2 % ophthalmic solution 1 drop, 1 drop, Both Eyes, BID, Jaquan Ríos MD, 1 drop at 10/19/23 1009    exemestane (Aromasin) tablet 25 mg, 25 mg, oral, Daily, Jaquan Ríos MD, 25 mg at 10/19/23 1005    ferrous gluconate (Fergon) 324 (38 Fe) mg tablet 324 mg, 324 mg, oral, BID after meals, Jaquan Ríos MD, 324 mg at 10/19/23 1004    glucagon (Glucagen) injection 1 mg, 1 mg, intramuscular, q15 min PRN, Jaquan Ríos MD    guaiFENesin (Mucinex) 12 hr tablet 600 mg, 600 mg, oral, q12h PRN, Jaquan Ríos MD    insulin lispro (HumaLOG) injection 0-15 Units, 0-15 Units, subcutaneous, TID with meals, Jaquan Ríos MD, 3 Units at 10/18/23 1849    ipratropium-albuteroL (Duo-Neb) 0.5-2.5 mg/3 mL nebulizer solution 3 mL, 3 mL, nebulization, TID, Jaquan Ríos MD, 3 mL at 10/19/23 1216    iron sucrose (Venofer) injection 200 mg, 200 mg, intravenous, Nightly, Olivia Parrish, APRN-CNP, Stopped at 10/18/23 2105    ketorolac (Acular) 0.5 % ophthalmic solution 1 drop, 1 drop, Both Eyes, 4x daily, Jaquan Ríos MD, 1 drop at 10/19/23 1331    latanoprost (Xalatan) 0.005 % ophthalmic solution 1 drop, 1 drop, Both Eyes, Nightly, Jaquan Ríos MD, 1 drop at 10/18/23 2100    lidocaine (Xylocaine) 10 mg/mL (1 %) injection 10 mg, 1 mL, infiltration, Once, Carlos Malone MD    metoprolol succinate XL (Toprol-XL) 24 hr tablet 50 mg, 50 mg, oral, Daily, Olivia Parrish, APRN-CNP, 50 mg at 10/19/23 1004    oxybutynin (Ditropan) tablet 5 mg, 5 mg, oral, BID, Jaquan Ríos MD, 5 mg at 10/19/23 1004    oxyCODONE (Roxicodone) immediate release tablet 5 mg, 5 mg, oral, q4h PRN, Jaquan Ríos MD, 5 mg at 10/19/23 1029    oxygen (O2) therapy, , inhalation, Continuous, Jaquan Ríos MD, Last Rate: 180,000 mL/hr at 10/17/23 1115, 3 L/min  at 10/17/23 1115    pantoprazole (ProtoNix) EC tablet 40 mg, 40 mg, oral, Daily before breakfast, Jaquan Ríos MD, 40 mg at 10/19/23 0530    piperacillin-tazobactam-dextrose (Zosyn) IV 4.5 g, 4.5 g, intravenous, q8h, Jaquan Ríos MD, Stopped at 10/19/23 1039    polyethylene glycol (Glycolax, Miralax) packet 17 g, 17 g, oral, Daily PRN, Jaquan Ríos MD    polyethylene glycol (Glycolax, Miralax) packet 17 g, 17 g, oral, Daily, MALIK Dobbs-CNP, 17 g at 10/19/23 1008    [COMPLETED] remdesivir (Veklury) 200 mg in sodium chloride 0.9% 250 mL IV, 200 mg, intravenous, Once, Stopped at 10/16/23 0039 **FOLLOWED BY** remdesivir (Veklury) 100 mg in sodium chloride 0.9% 250 mL IV, 100 mg, intravenous, q24h, Jaquan Ríos MD, Stopped at 10/18/23 2319    rosuvastatin (Crestor) tablet 20 mg, 20 mg, oral, Daily, Jaquan Ríos MD, 20 mg at 10/19/23 1004    tiZANidine (Zanaflex) tablet 2 mg, 2 mg, oral, q6h PRN, MALIK Dobbs-CNP    vancomycin-diluent combo no.1 (Xellia) IVPB 1,250 mg, 1,250 mg, intravenous, q24h, Jaquan Ríos MD, Stopped at 10/19/23 0700          Results for orders placed or performed during the hospital encounter of 10/15/23 (from the past 24 hour(s))   POCT GLUCOSE   Result Value Ref Range    POCT Glucose 192 (H) 74 - 99 mg/dL   Basic Metabolic Panel   Result Value Ref Range    Glucose 334 (H) 65 - 99 mg/dL    Sodium 140 133 - 145 mmol/L    Potassium 3.4 3.4 - 5.1 mmol/L    Chloride 94 (L) 97 - 107 mmol/L    Bicarbonate 21 (L) 24 - 31 mmol/L    Urea Nitrogen 17 8 - 25 mg/dL    Creatinine 0.70 0.40 - 1.60 mg/dL    eGFR 87 >60 mL/min/1.73m*2    Calcium 7.9 (L) 8.5 - 10.4 mg/dL    Anion Gap >19 (H) <=19 mmol/L   CBC   Result Value Ref Range    WBC 6.1 4.4 - 11.3 x10*3/uL    nRBC 0.0 0.0 - 0.0 /100 WBCs    RBC 2.65 (L) 4.00 - 5.20 x10*6/uL    Hemoglobin 7.9 (L) 12.0 - 16.0 g/dL    Hematocrit 25.9 (L) 36.0 - 46.0 %    MCV 98 80 - 100 fL    MCH 29.8 26.0 - 34.0  pg    MCHC 30.5 (L) 32.0 - 36.0 g/dL    RDW 17.3 (H) 11.5 - 14.5 %    Platelets 168 150 - 450 x10*3/uL    MPV 10.0 7.5 - 11.5 fL   POCT GLUCOSE   Result Value Ref Range    POCT Glucose 180 (H) 74 - 99 mg/dL            XR foot left 3+ views    Result Date: 10/15/2023  Interpreted By:  Joshua Galeana, STUDY: XR FOOT LEFT 3+ VIEWS; ;  10/15/2023 5:28 pm   INDICATION: Signs/Symptoms:infection post op.   COMPARISON: Intraoperative exam from 09/14/2023   ACCESSION NUMBER(S): [id number]   ORDERING CLINICIAN: JF MULLINS   FINDINGS: There is again recent amputation through the distal 1st through 5th metatarsal bones. There are no focal lytic lesions or periosteal reaction to suggest acute osteomyelitis. Degenerative changes of tarsal joints are present, with joint space narrowing and small dorsal osteophytes. The alignment is anatomic. There is diffuse soft tissue swelling.       Diffuse soft tissue swelling status post recent 1st through 5th distal metatarsal amputation. No acute osteomyelitis.   Signed by: Joshua Galeana 10/15/2023 5:36 PM Dictation workstation:   [id number]    XR chest 1 view    Result Date: 10/15/2023  Interpreted By:  Joshua Galeana, STUDY: XR CHEST 1 VIEW; 10/15/2023 5:30 pm   INDICATION: Signs/Symptoms:dyspnea   COMPARISON: May 2023   ACCESSION NUMBER(S): [id number]   ORDERING CLINICIAN: JF MULLINS   FINDINGS: The study is limited due to rotation and poor inspiratory effort, with resultant crowding of the pulmonary vasculature. The cardiac silhouette is borderline prominent, exaggerated by the technique. Mild bilateral perihilar interstitial infiltrates are noted. There is no pneumothorax or significant effusion. The osseous structures are unremarkable.       Allowing for the aforementioned limitation, bilateral perihilar interstitial infiltrates, most likely due to mild/early pulmonary edema; correlate clinically and follow-up as needed.   Signed by: Joshua Galeana 10/15/2023 5:34 PM  Dictation workstation:   [id number]    OCT, Optic Nerve - OU - Both Eyes    Result Date: 10/11/2023  Right Eye Images reviewed and comparison made to baseline. Reliability: borderline. Left Eye Images reviewed and comparison made to baseline. Reliability: poor. Notes Thinning OD from baseline, but stable from previous year,, OS is unreliable to determine    Vascular US Lower Extremity Venous Duplex Left    Result Date: 9/19/2023  PROCEDURE:         DPL VENOUS LEG LT - WUS  2594 REASON FOR EXAM: Swellilng of the left calf RESULT: MRN: [id number] Patient Name: FELICITA CANTU STUDY: DPL VENOUS LEG LT; 9/19/2023 3:33 pm INDICATION: Swellilng of the left calf. COMPARISON: 09/14/2023 ACCESSION NUMBER(S): [id number] ORDERING CLINICIAN: ELIZABETH CHACON TECHNIQUE: 2D grayscale and color-flow duplex images were obtained along with Doppler spectral waveform analysis of the deep venous system of the left lower extremity from the groin to the knee. Attempts were made to image the calf veins. Venous compression and augmentation were performed. Contralateral common femoral vein also interrogated. FINDINGS: Left Lower Extremity: There is normal compressibility and flow within the visualized vessels of the deep venous system of this lower extremity. Contralateral common femoral vein was patent. There is a 1.0 x 2.3 x 5.0 cm thick-walled cyst within the left popliteal fossa. IMPRESSION: No sign of deep venous thrombus in the left lower extremity. The left popliteal cyst is slightly smaller in size when compared with prior study. MACRO: None. Dictation workstation:   [id number] Original Interpreting Physician:   NADYA CHILDRESS M.D. Original Transcribed by/Date: MMODAL Sep 19 2023 10:38A Original Electronically Signed by/Date: NDAYA CHILDRESS M.D. Sep 19 2023  3:37P Addendum Interpreting Physician: Addendum Transcribed by/Date: NO ADDENDUM Addendum Electronically Signed by/Date:       Assessment/Plan   Principal Problem:    Acute respiratory  failure (CMS/[facility])  Active Problems:    Arthritis, multiple joint involvement    Balance disorder    Lumbar canal stenosis    Chronic diastolic congestive heart failure (CMS/HCC)    Dermatitis    Obstructive sleep apnea, adult    Peripheral polyneuropathy    Anemia due to chronic kidney disease    Cellulitis and abscess of lower extremity    Cellulitis of left lower limb    Cutaneous abscess of left lower limb    Malignant neoplasm of unspecified site of unspecified female breast (CMS/[facility])    NATALIYA (obstructive sleep apnea)    Personal history of nicotine dependence    Primary generalized (osteo)arthritis    Abnormal computed tomography scan    Accidental fall    Anxiety    Asthenia    Chronic respiratory failure with hypoxia (CMS/[facility])    Diabetes mellitus type 2 in obese (CMS/[facility])    Gangrene of foot (CMS/[facility])    Dyspnea    Oxygen at baseline  Continue IBD/ICS  Insentive spirometry/pulmonary hygiene  IV vancomycin, Zosyn per Infectious Disease  Follow-up cultures  Remdesivir-last dose today  Oral dexamethasone for a 10 day total  IV Venofer  Trend H&H  Eliquis   GI prophylaxis  Podiatry following  PT/OT/out of bed     I spent 15 minutes in the professional and overall care of this patient.    Melissa Orozco, APRN-CNP

## 2023-10-19 NOTE — PROGRESS NOTES
Samira López is a 82 y.o. female on day 4 of admission presenting with Acute respiratory failure (CMS/HCC).    Subjective   Patient seen and examined.  Resting in bed in no acute distress.  Awake alert oriented x3.  No new complaints.  No shortness of breath or cough.  No fevers, chills or sweats.  Left foot pain, controlled.  + Bowel movement.  Discussed CODE STATUS in detail.  She not want CPR.  She is okay with mechanical ventilation    Objective     Physical Exam  Constitutional:       General: She is not in acute distress.     Appearance: She is obese. She is not ill-appearing, toxic-appearing or diaphoretic.   HENT:      Head: Normocephalic and atraumatic.      Right Ear: Tympanic membrane normal.      Left Ear: Tympanic membrane normal.      Nose: Nose normal.      Mouth/Throat:      Mouth: Mucous membranes are moist.      Pharynx: Oropharynx is clear.   Eyes:      Extraocular Movements: Extraocular movements intact.      Conjunctiva/sclera: Conjunctivae normal.      Pupils: Pupils are equal, round, and reactive to light.   Cardiovascular:      Rate and Rhythm: Normal rate and regular rhythm.      Pulses: Normal pulses.      Heart sounds: Normal heart sounds.   Pulmonary:      Effort: Pulmonary effort is normal. No respiratory distress.      Breath sounds: Normal breath sounds. No wheezing or rales.      Comments: Respirations even and unlabored on 2 liters nasal cannula (home oxygen)  Abdominal:      General: Bowel sounds are normal. There is no distension.      Palpations: Abdomen is soft.      Tenderness: There is no abdominal tenderness. There is no guarding.   Genitourinary:     Comments: /rectal examination deferred  Musculoskeletal:         General: Swelling present. Normal range of motion.      Cervical back: Normal range of motion and neck supple.      Right lower leg: Edema present.      Left lower leg: Edema present.      Comments: Left lower extremity unna boot, left foot TMA dressing intact  "  Skin:     General: Skin is warm and dry.      Capillary Refill: Capillary refill takes less than 2 seconds.      Comments: Left lower extremity unna boot, left foot TMA dressing intact   Neurological:      General: No focal deficit present.      Mental Status: She is alert and oriented to person, place, and time.   Psychiatric:         Mood and Affect: Mood normal.         Behavior: Behavior normal.       Last Recorded Vitals  Blood pressure (!) 189/76, pulse 68, temperature 36.7 °C (98.1 °F), temperature source Temporal, resp. rate 15, height 1.791 m (5' 10.5\"), weight 84.6 kg (186 lb 8.2 oz), SpO2 97 %.    Intake/Output last 3 Shifts:  I/O last 3 completed shifts:  In: 500.2 (5.9 mL/kg) [P.O.:120; I.V.:30.2 (0.4 mL/kg); IV Piggyback:350]  Out: - (0 mL/kg)   Weight: 84.6 kg     Telemetry normal sinus rhythm rate 80's    Relevant Results  Results for orders placed or performed during the hospital encounter of 10/15/23 (from the past 24 hour(s))   POCT GLUCOSE   Result Value Ref Range    POCT Glucose 192 (H) 74 - 99 mg/dL   Basic Metabolic Panel   Result Value Ref Range    Glucose 334 (H) 65 - 99 mg/dL    Sodium 140 133 - 145 mmol/L    Potassium 3.4 3.4 - 5.1 mmol/L    Chloride 94 (L) 97 - 107 mmol/L    Bicarbonate 21 (L) 24 - 31 mmol/L    Urea Nitrogen 17 8 - 25 mg/dL    Creatinine 0.70 0.40 - 1.60 mg/dL    eGFR 87 >60 mL/min/1.73m*2    Calcium 7.9 (L) 8.5 - 10.4 mg/dL    Anion Gap >19 (H) <=19 mmol/L   CBC   Result Value Ref Range    WBC 6.1 4.4 - 11.3 x10*3/uL    nRBC 0.0 0.0 - 0.0 /100 WBCs    RBC 2.65 (L) 4.00 - 5.20 x10*6/uL    Hemoglobin 7.9 (L) 12.0 - 16.0 g/dL    Hematocrit 25.9 (L) 36.0 - 46.0 %    MCV 98 80 - 100 fL    MCH 29.8 26.0 - 34.0 pg    MCHC 30.5 (L) 32.0 - 36.0 g/dL    RDW 17.3 (H) 11.5 - 14.5 %    Platelets 168 150 - 450 x10*3/uL    MPV 10.0 7.5 - 11.5 fL   POCT GLUCOSE   Result Value Ref Range    POCT Glucose 180 (H) 74 - 99 mg/dL     Electrocardiogram, 12-lead PRN ACS symptoms    Result " Date: 10/18/2023  Atrial fibrillation with premature ventricular or aberrantly conducted complexes Anteroseptal infarct , age undetermined Marked ST abnormality, possible inferolateral subendocardial injury Abnormal ECG When compared with ECG of 24-APR-2020 20:07, Significant changes have occurred Confirmed by Jorge Santiago ([id number]) on 10/18/2023 12:15:26 PM        Your medication list        START taking these medications        Instructions Last Dose Given Next Dose Due   sodium chloride 0.9 % piggyback 100 mL with piperacillin-tazobactam 4.5 gram recon soln 4.5 g      Infuse 4.5 g into a venous catheter every 8 hours for 11 days.              ASK your doctor about these medications        Instructions Last Dose Given Next Dose Due   acetaminophen 325 mg tablet  Commonly known as: Tylenol           ascorbic acid 500 mg tablet  Commonly known as: Vitamin C           cholecalciferol 10 mcg (400 unit) tablet,chewable  Commonly known as: Vitamin D-3  Ask about: Which instructions should I use?      Chew 2 tablets (20 mcg) once daily.       diphenhydrAMINE 25 mg tablet  Commonly known as: Sominex           dorzolamide 2 % ophthalmic solution  Commonly known as: Trusopt      Administer 1 drop into both eyes 2 times a day.       Eliquis 5 mg tablet  Generic drug: apixaban           exemestane 25 mg tablet  Commonly known as: Aromasin      Take 1 tablet (25 mg total) by mouth once daily.  Take after a meal.  Try to take at the same time each day.       ferrous sulfate 325 (65 Fe) MG tablet           hydrocortisone 1 % cream           Ibrance 100 mg tablet  Generic drug: palbociclib  Ask about: Which instructions should I use?      TAKE ONE (1) TABLET BY MOUTH ONCE A DAY ON DAYS 1-21 OF A 28-DAY CYCLE       latanoprost 0.005 % ophthalmic solution  Commonly known as: Xalatan      INSTILL  1 DROP INTO BOTH EYES EVERY DAY AT BEDTIME       metoprolol succinate XL 50 mg 24 hr tablet  Commonly known as: Toprol-XL            NexIUM 24HR 20 mg DR capsule  Generic drug: esomeprazole           oxybutynin XL 10 mg 24 hr tablet  Commonly known as: Ditropan-XL  Ask about: Which instructions should I use?           oxyCODONE 5 mg immediate release tablet  Commonly known as: Roxicodone           oxygen gas therapy  Commonly known as: O2           polyethylene glycol packet  Commonly known as: Glycolax, Miralax           rosuvastatin 20 mg tablet  Commonly known as: Crestor      Take 1 tablet (20 mg) by mouth once daily.       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  Hypertensive.  Asymptomatic.  Blood pressure readings fluctuating.  Repeat blood pressure and monitor.  She has a PICC line in her upper extremity and history of mastectomy on the other side.  She also has an Unna boot on the left lower extremity.  Continue to monitor telemetry.  Normal sinus rhythm.  Continue beta-blocker Toprol-XL 50 mg p.o. daily.  Continue oral anticoagulation Eliquis for stroke prevention from paroxysmal atrial fibrillation.  Respiratory status, pulse oximetry remained stable currently on 2 L nasal cannula.  She wears chronic oxygen.  Continue course of IV  Remdesivir.  P.o. Dexamethasone.  Pulmonary hygiene.  Encourage incentive spirometer use 10 times per hour while awake.  Maintain isolation precautions per protocol.  Maintain left lower extremity Unna boot and left foot wound dressing per Podiatry.  Dressing changes 3 times weekly per Podiatry.  Left foot wound culture growing Pseudomonas a pending.  2/2 blood cultures pending no growth.  Continue IV antibiotics per Infectious disease PICC line.  Follow-up cultures.  Monitor temperature and white blood cell count.  ESR and CRP elevated and noted.  Monitor BMP and CBC.  Chronic anemia, iron deficiency.  No evidence of acute blood loss.  Guaiac stools.  IV Venofer.  PPI.  Monitor H&H for now.  Patient follow-up with Hematology oncology.  Continue home medications as prescribed as appropriate.  Point-of-care glucose reviewed.  Monitor point-of-care glucose AC/HS.  Sliding scale insulin.  PT/OT.  Fall precautions.  Up with assistance.  DVT prophylaxis Eliquis.  Supportive care.  Patient reassured.   following for discharge planning.  Discharge plan to skilled nursing rehabilitation.  Awaiting discharge arrangements.  Discussed code status.  She does want CPR.  She is okay with mechanical ventilation.  DNR CCA.  Okay for mechanical ventilation.  Discussed with Dr. Ríos.  Orders placed.  Anticipate discharge soon.    ADDENDUM:  Spoke with Infectious disease -- Plan for IV Zosyn.  Okay for discharge    Anticipate discharge soon    MALIK Dobbs-CNP

## 2023-10-19 NOTE — NURSING NOTE
Patient refusing accu check despite education on diabetes and wound healing.  No complaints of pain/discomfort.  Voices relief from PRN pain medications.  Call light and commonly used items in reach.

## 2023-10-19 NOTE — PROGRESS NOTES
Samira López is a 82 y.o. female on day 4 of admission presenting with Acute respiratory failure (CMS/HCC).    Subjective   Interval History:   Remains afebrile  Denies shortness of breath, cough, chest pain  Denies nausea, vomiting, diarrhea      Objective   Range of Vitals (last 24 hours)  Heart Rate:  [58-76]   Temp:  [36 °C (96.8 °F)-36.7 °C (98.1 °F)]   Resp:  [15-20]   BP: ()/(53-76)   Weight:  [84.6 kg (186 lb 8.2 oz)]   SpO2:  [95 %-97 %]   Daily Weight  10/19/23 : 84.6 kg (186 lb 8.2 oz)    Body mass index is 26.38 kg/m².    Physical Exam  General: No acute distress, cooperative   Psych: Awake, alert  Head: Anicteric with no conjunctival injection  ENT: Midline nasal septum with no mucosal lesions  Neck: Supple with no obvious masses  Chest: Diminished but clear to auscultation bilaterally with no rales or rhonchi  Cardiovascular: Regular rate and rhythm with no rubs murmurs or gallops  Abdomen: Soft, nontender, bowel sounds normoactive  Extremities:  No cyanosis, swelling, or clubbing  Skin: Left foot dressing intact  Musculoskeletal: No joint swelling or tenderness      Antibiotics  piperacillin-tazobactam-dextrose (Zosyn) IV 3.375 g  vancomycin-diluent combo no.1 (Xellia) IVPB 1,250 mg  acetaminophen (Tylenol) tablet 650 mg  furosemide (Lasix) injection 40 mg  dexAMETHasone (Decadron) injection 10 mg  amLODIPine (Norvasc) tablet 5 mg  apixaban (Eliquis) tablet 2.5 mg  clopidogrel (Plavix) tablet 75 mg  ketorolac (Acular) 0.5 % ophthalmic solution 1 drop  metoprolol succinate XL (Toprol-XL) 24 hr tablet 50 mg  pantoprazole (ProtoNix) EC tablet 40 mg  rosuvastatin (Crestor) tablet 20 mg  aspirin chewable tablet 81 mg  calcium carbonate (Tums) chewable tablet 500 mg  ferrous gluconate (Fergon) 324 (38 Fe) mg tablet 324 mg  omega 3-dha-epa-fish oil 1,200 (144-216) mg capsule 3,600 mg  furosemide (Lasix) tablet 40 mg  oxygen (O2) therapy  cholecalciferol (Vitamin D-3) capsule 50 mcg  biotin tablet  1,000 mcg  clobetasol (Temovate) 0.05 % ointment 1 Application  lisinopril tablet 20 mg  palbociclib (Ibrance) tablet 100 mg  sodium hypochlorite (Dakin's HALF-Strength) 0.25 % external solution  palbociclib (Ibrance) tablet 100 mg  acetaminophen (Tylenol) tablet 325 mg  ascorbic acid (Vitamin C) tablet 500 mg  diphenhydrAMINE (Sominex) tablet 25 mg  exemestane (Aromasin) tablet 25 mg  latanoprost (Xalatan) 0.005 % ophthalmic solution 1 drop  dorzolamide (Trusopt) 2 % ophthalmic solution 1 drop  cholecalciferol (Vitamin D-3) tablet 125 mcg  pantoprazole (ProtoNix) EC tablet 40 mg  oxybutynin (Ditropan) tablet 5 mg  palbociclib (Ibrance) capsule 125 mg  tiZANidine (Zanaflex) tablet 2 mg  piperacillin-tazobactam-dextrose (Zosyn) IV 4.5 g  ipratropium-albuteroL (Duo-Neb) 0.5-2.5 mg/3 mL nebulizer solution 3 mL  dexAMETHasone (Decadron) tablet 6 mg  remdesivir (Veklury) 200 mg in sodium chloride 0.9% 250 mL IV  remdesivir (Veklury) 100 mg in sodium chloride 0.9% 250 mL IV  vancomycin-diluent combo no.1 (Xellia) IVPB 750 mg  acetaminophen (Tylenol) tablet 650 mg  acetaminophen (Tylenol) oral liquid 650 mg  acetaminophen (Tylenol) suppository 650 mg  polyethylene glycol (Glycolax, Miralax) packet 17 g  benzocaine-menthol (Cepastat Sore Throat) 15-3.6 mg lozenge 1 lozenge  dextromethorphan-guaifenesin (Robitussin DM)  mg/5 mL oral liquid 5 mL  guaiFENesin (Mucinex) 12 hr tablet 600 mg  dextrose 50 % injection 25 g  glucagon (Glucagen) injection 1 mg  dextrose 10 % in water (D10W) infusion  insulin lispro (HumaLOG) injection 0-15 Units  piperacillin-tazobactam-dextrose (Zosyn) IV 4.5 g  ipratropium-albuteroL (Duo-Neb) 0.5-2.5 mg/3 mL nebulizer solution 3 mL  lidocaine (Xylocaine) 10 mg/mL (1 %) injection 10 mg  alteplase (Cathflo Activase) injection 2 mg      oxyCODONE (Roxicodone) immediate release tablet  polyethylene glycol (Glycolax, Miralax) packet  oxyCODONE (Roxicodone) immediate release tablet 5 mg  iron  sucrose (Venofer) 200 mg in sodium chloride 0.9% 100 mL IVPB  iron sucrose (Venofer) injection 200 mg  oxygen (O2) therapy  vancomycin-diluent combo no.1 (Xellia) IVPB 1 g  polyethylene glycol (Glycolax, Miralax) packet 17 g  tiZANidine (Zanaflex) tablet 2 mg  dilTIAZem (Cardizem) 125 mg in dextrose 5% 125 mL (1 mg/mL) infusion (premix)  dilTIAZem (Cardizem) injection 15 mg  vancomycin-diluent combo no.1 (Xellia) IVPB 1,250 mg  cholecalciferol (Vitamin D-3) tablet 2,000 Units      Relevant Results  Labs  Results from last 72 hours   Lab Units 10/19/23  0136 10/18/23  0606 10/17/23  0556   WBC AUTO x10*3/uL 6.1 10.2 10.1   HEMOGLOBIN g/dL 7.9* 9.0* 9.0*   HEMATOCRIT % 25.9* 28.7* 29.6*   PLATELETS AUTO x10*3/uL 168 211 203       Results from last 72 hours   Lab Units 10/19/23  0136 10/18/23  0606 10/17/23  0556   SODIUM mmol/L 140 142 139   POTASSIUM mmol/L 3.4 3.4 4.1   CHLORIDE mmol/L 94* 105 104   CO2 mmol/L 21* 27 25   BUN mg/dL 17 19 21   CREATININE mg/dL 0.70 0.70 0.80   GLUCOSE mg/dL 334* 169* 185*   CALCIUM mg/dL 7.9* 9.3 9.4   ANION GAP mmol/L >19* 10 10   EGFR mL/min/1.73m*2 87 87 74           Estimated Creatinine Clearance: 75.3 mL/min (by C-G formula based on SCr of 0.7 mg/dL).  C-Reactive Protein   Date Value Ref Range Status   10/16/2023 5.90 (H) 0.00 - 2.00 mg/dL Final     CRP   Date Value Ref Range Status   09/09/2023 6.3 (H) 0 - 2.0 MG/DL Final     Comment:     Performed at [address] [address]   04/24/2020 0.18 mg/dL Final     Comment:     REF VALUE  < 1.00       Microbiology  Susceptibility data from last 14 days.  Collected Specimen Info Organism Aztreonam Cefepime Ceftazidime Ciprofloxacin Gentamicin Levofloxacin Piperacillin/Tazobactam Tobramycin   10/16/23 Tissue/Biopsy from Diabetic Foot Ulcer Pseudomonas aeruginosa           10/15/23 Tissue/Biopsy from Wound/Tissue Pseudomonas aeruginosa S S S S S S S S     Mixed Gram-Positive and Gram-Negative Bacteria              MRSA PCR negative  Wound culture sent 10/15/2023 with rare polymorphonuclear leukocytes, mixed gram-positive and gram-negative bacteria, pansensitive Pseudomonas  Wound culture sent 10/16/2023 pending with Pseudomonas-susceptibility pending, moderate gram-positive cocci  Blood cultures pending with no growth - sent 10/15/2023  Imaging  Reviewed    Assessment/Plan     Acute on chronic respiratory failure  Coronavirus infection  Marginal leukocytosis-resolved  Left diabetic foot ulcer-Albrecht grade 3  Possible left foot cellulitis-culture growing Pseudomonas, gram-positive cocci       Continue dexamethasone-total of 10 days  Continue remdesivir-completes course today 10/19/2023  Supplemental oxygen as needed  Local care  Glycemic control  Continue vancomycin  Continue Zosyn  Follow-up finalized wound culture  Follow-up blood cultures  Supportive care  Podiatry following  Monitor temperature and WBC  Droplet plus precautions      Addendum:   Discharge plan unless finalized culture dictates otherwise: Zosyn 4.5 gm IV q8h for a total of 2 weeks through 10/30/23, CBC with diff/CMP weekly.  See discharge rec.   Discussed with primary and Dr Kira CHILDERS Staff, APRN-CNP

## 2023-10-19 NOTE — NURSING NOTE
Assumed care of patient from dayshift nurse. Patient resting in bed comfortably in bed at this time with no complaints of pain and or discomfort. Maintenance fluids running through one of the patient's PICC line lumens; other PICC lumen flushes and draws blood back. Left foot wrapped and dressed per podiatry physician. Nasal cannula in place, set at 2L of oxygen. Bed in lowest position and locked, bed alarm on and functioning, and call bell and personal belongings within reach. No further interventions at this time.

## 2023-10-19 NOTE — CARE PLAN
Problem: Fall/Injury  Goal: Not fall by end of shift  Outcome: Progressing  Goal: Be free from injury by end of the shift  Outcome: Progressing  Goal: Verbalize understanding of personal risk factors for fall in the hospital  Outcome: Progressing  Goal: Verbalize understanding of risk factor reduction measures to prevent injury from fall in the home  Outcome: Progressing  Goal: Use assistive devices by end of the shift  Outcome: Progressing  Goal: Pace activities to prevent fatigue by end of the shift  Outcome: Progressing     Problem: Pain  Goal: My pain/discomfort is manageable  Outcome: Progressing     Problem: Safety  Goal: Patient will be injury free during hospitalization  Outcome: Progressing  Goal: I will remain free of falls  Outcome: Progressing     Problem: Daily Care  Goal: Daily care needs are met  Outcome: Progressing     Problem: Psychosocial Needs  Goal: Demonstrates ability to cope with hospitalization/illness  Outcome: Progressing  Goal: Collaborate with me, my family, and caregiver to identify my specific goals  Outcome: Progressing     Problem: Discharge Barriers  Goal: My discharge needs are met  Outcome: Progressing     Problem: Skin  Goal: Decreased wound size/increased tissue granulation at next dressing change  Outcome: Progressing  Goal: Participates in plan/prevention/treatment measures  Outcome: Progressing  Goal: Prevent/manage excess moisture  Outcome: Progressing  Goal: Prevent/minimize sheer/friction injuries  Outcome: Progressing  Goal: Promote/optimize nutrition  Outcome: Progressing  Goal: Promote skin healing  Outcome: Progressing     Problem: Respiratory  Goal: Clear secretions with interventions this shift  Outcome: Progressing  Goal: Minimize anxiety/maximize coping throughout shift  Outcome: Progressing  Goal: Minimal/no exertional discomfort or dyspnea this shift  Outcome: Progressing  Goal: No signs of respiratory distress (eg. Use of accessory muscles. Peds  grunting)  Outcome: Progressing  Goal: Patent airway maintained this shift  Outcome: Progressing  Goal: Tolerate mechanical ventilation evidenced by VS/agitation level this shift  Outcome: Progressing  Goal: Tolerate pulmonary toileting this shift  Outcome: Progressing  Goal: Verbalize decreased shortness of breath this shift  Outcome: Progressing  Goal: Wean oxygen to maintain O2 saturation per order/standard this shift  Outcome: Progressing  Goal: Increase self care and/or family involvement in next 24 hours  Outcome: Progressing     Problem: Bathing  Goal: STG - Patient will bathe body UB/LB independent with AE as needed  Outcome: Progressing     Problem: Dressings Lower Extremities  Goal: LTG - Patient will utilize adaptive techniques/equipment to dress lower body independently  Outcome: Progressing     Problem: Toileting  Goal: LTG - Patient will demonstrate use of appropriate intervention for safe toileting independent with 2ww  Outcome: Progressing   The patient's goals for the shift include pain management    The clinical goals for the shift include stable VS    Over the shift, the patient did not make progress toward the following goals. Barriers to progression include active foot infection. Recommendations to address these barriers include pain medication and proper wound dressing to prevent discomfort.

## 2023-10-19 NOTE — PROGRESS NOTES
Occupational Therapy    OT Treatment    Patient Name: Samira López  MRN: [id number]  Today's Date: 10/19/2023  Time Calculation  Start Time: 1410  Stop Time: 1440  Time Calculation (min): 30 min         Assessment:  OT Assessment: Pt demo good safety awareness utilizing 2WW during functional transfers  End of Session Communication: Bedside nurse  End of Session Patient Position:  (In WC)  Strengths: Ability to acquire knowledge, Attitude of self  Plan:          Subjective   General:  OT Received On: 10/19/23  Prior to Session Communication: Bedside nurse  Patient Position Received: Bed, 2 rail up  General Comment: Pt cleared by nursing to be seen for therapy. Increased time required to facilitate pt participation. Pt educated on therapy goals and with Max cues for encouragement and agreeable to participate.  Vital Signs:     Pain:  Pain Assessment  Pain Assessment: 0-10  Pain Score: 0 - No pain    Objective    Activities of Daily Living: Grooming  Grooming Comments: attempted and pt declined    LE Dressing  LE Dressing: Yes  Shoe Level of Assistance: Close supervision  Orthotics Level of Assistance: Maximum assistance  LE Dressing Where Assessed: Edge of bed  LE Dressing Comments: Pt donned shoe from EOB with close SUP and required Max A to don orthotic shoe. Pt re-educated on heel WB precautions and verbalized understanding.    Toileting  Toileting Comments: Pt declined, reporting to have completed prior  Functional Standing Tolerance:     Bed Mobility/Transfers: Bed Mobility  Bed Mobility: Yes  Bed Mobility 1  Bed Mobility 1: Supine to sitting  Level of Assistance 1: Close supervision    Transfers  Transfer: Yes  Transfer 1  Transfer From 1: Bed to  Transfer to 1: Wheelchair  Transfer Device 1: Walker  Transfer Level of Assistance 1: Contact guard  Trials/Comments 1: Pt transferred from EOB to  in hallway. Pt utilized 2WW and required Min verbal/visual cues for proper hand/foot placement and proper 2WW  placement. Pt demo no LOB and adhered to heel WB precautions.    Outcome Measures:[facility] Daily Activity  Putting on and taking off regular lower body clothing: A lot  Bathing (including washing, rinsing, drying): A lot  Putting on and taking off regular upper body clothing: A little  Toileting, which includes using toilet, bedpan or urinal: A lot  Taking care of personal grooming such as brushing teeth: None  Eating Meals: None  Daily Activity - Total Score: 17        Education Documentation  ADL Training, taught by TRINITY Morris at 10/19/2023  3:48 PM.  Learner: Patient  Readiness: Acceptance  Method: Explanation, Demonstration  Response: Verbalizes Understanding, Demonstrated Understanding  Comment: Pt educated on proper hand/foot placement for safe functional transfers and heel WB precautions.    Education Comments  No comments found.        OP EDUCATION:       Goals:    Problem: Bathing  Goal: STG - Patient will bathe body UB/LB independent with AE as needed  Outcome: Progressing     Problem: Dressings Lower Extremities  Goal: LTG - Patient will utilize adaptive techniques/equipment to dress lower body independently  Outcome: Progressing     Problem: Mobility  Goal: Goal 1Patient will perform functional mobility to and from the bathroom with close supervision and 2ww  Outcome: Progressing     Problem: Toileting  Goal: LTG - Patient will demonstrate use of appropriate intervention for safe toileting independent with 2ww  Outcome: Progressing     Problem: Transfers  Goal: LTG - Patient will demonstrate safe transfer techniques independent with 2ww  Outcome: Progressing

## 2023-10-20 LAB
ANION GAP SERPL CALC-SCNC: 10 MMOL/L
BACTERIA BLD CULT: NORMAL
BACTERIA BLD CULT: NORMAL
BACTERIA SPEC CULT: ABNORMAL
BACTERIA SPEC CULT: ABNORMAL
BUN SERPL-MCNC: 18 MG/DL (ref 8–25)
CALCIUM SERPL-MCNC: 8 MG/DL (ref 8.5–10.4)
CHLORIDE SERPL-SCNC: 108 MMOL/L (ref 97–107)
CO2 SERPL-SCNC: 21 MMOL/L (ref 24–31)
CREAT SERPL-MCNC: 0.6 MG/DL (ref 0.4–1.6)
ERYTHROCYTE [DISTWIDTH] IN BLOOD BY AUTOMATED COUNT: 16.7 % (ref 11.5–14.5)
GFR SERPL CREATININE-BSD FRML MDRD: 90 ML/MIN/1.73M*2
GLUCOSE BLD MANUAL STRIP-MCNC: 153 MG/DL (ref 74–99)
GLUCOSE BLD MANUAL STRIP-MCNC: 172 MG/DL (ref 74–99)
GLUCOSE BLD MANUAL STRIP-MCNC: 199 MG/DL (ref 74–99)
GLUCOSE BLD MANUAL STRIP-MCNC: 215 MG/DL (ref 74–99)
GLUCOSE SERPL-MCNC: 170 MG/DL (ref 65–99)
GRAM STN SPEC: ABNORMAL
GRAM STN SPEC: ABNORMAL
HCT VFR BLD AUTO: 28.4 % (ref 36–46)
HGB BLD-MCNC: 8.6 G/DL (ref 12–16)
MCH RBC QN AUTO: 29.7 PG (ref 26–34)
MCHC RBC AUTO-ENTMCNC: 30.3 G/DL (ref 32–36)
MCV RBC AUTO: 98 FL (ref 80–100)
NRBC BLD-RTO: 0.3 /100 WBCS (ref 0–0)
PLATELET # BLD AUTO: 211 X10*3/UL (ref 150–450)
PMV BLD AUTO: 10.8 FL (ref 7.5–11.5)
POTASSIUM SERPL-SCNC: 3.7 MMOL/L (ref 3.4–5.1)
RBC # BLD AUTO: 2.9 X10*6/UL (ref 4–5.2)
SODIUM SERPL-SCNC: 139 MMOL/L (ref 133–145)
VANCOMYCIN SERPL-MCNC: 13.9 UG/ML (ref 10–20)
WBC # BLD AUTO: 7.9 X10*3/UL (ref 4.4–11.3)

## 2023-10-20 PROCEDURE — 1200000002 HC GENERAL ROOM WITH TELEMETRY DAILY

## 2023-10-20 PROCEDURE — 2500000002 HC RX 250 W HCPCS SELF ADMINISTERED DRUGS (ALT 637 FOR MEDICARE OP, ALT 636 FOR OP/ED): Performed by: INTERNAL MEDICINE

## 2023-10-20 PROCEDURE — 2500000001 HC RX 250 WO HCPCS SELF ADMINISTERED DRUGS (ALT 637 FOR MEDICARE OP): Performed by: NURSE PRACTITIONER

## 2023-10-20 PROCEDURE — 94640 AIRWAY INHALATION TREATMENT: CPT

## 2023-10-20 PROCEDURE — 2500000004 HC RX 250 GENERAL PHARMACY W/ HCPCS (ALT 636 FOR OP/ED): Performed by: INTERNAL MEDICINE

## 2023-10-20 PROCEDURE — 82947 ASSAY GLUCOSE BLOOD QUANT: CPT

## 2023-10-20 PROCEDURE — 80048 BASIC METABOLIC PNL TOTAL CA: CPT | Performed by: NURSE PRACTITIONER

## 2023-10-20 PROCEDURE — 2500000001 HC RX 250 WO HCPCS SELF ADMINISTERED DRUGS (ALT 637 FOR MEDICARE OP): Performed by: INTERNAL MEDICINE

## 2023-10-20 PROCEDURE — 2500000004 HC RX 250 GENERAL PHARMACY W/ HCPCS (ALT 636 FOR OP/ED): Performed by: NURSE PRACTITIONER

## 2023-10-20 PROCEDURE — 9420000001 HC RT PATIENT EDUCATION 5 MIN

## 2023-10-20 PROCEDURE — 85027 COMPLETE CBC AUTOMATED: CPT | Performed by: NURSE PRACTITIONER

## 2023-10-20 PROCEDURE — 80202 ASSAY OF VANCOMYCIN: CPT | Performed by: INTERNAL MEDICINE

## 2023-10-20 RX ORDER — VANCOMYCIN 1.75 G/350ML
1.25 INJECTION, SOLUTION INTRAVENOUS EVERY 24 HOURS
Qty: 2250 ML | Refills: 0
Start: 2023-10-21 | End: 2023-10-24 | Stop reason: HOSPADM

## 2023-10-20 RX ADMIN — APIXABAN 2.5 MG: 2.5 TABLET, FILM COATED ORAL at 21:05

## 2023-10-20 RX ADMIN — Medication 324 MG: at 10:32

## 2023-10-20 RX ADMIN — INSULIN LISPRO 3 UNITS: 100 INJECTION, SOLUTION INTRAVENOUS; SUBCUTANEOUS at 17:42

## 2023-10-20 RX ADMIN — ACETAMINOPHEN 650 MG: 325 TABLET ORAL at 10:30

## 2023-10-20 RX ADMIN — PIPERACILLIN SODIUM AND TAZOBACTAM SODIUM 4.5 G: 4; .5 INJECTION, SOLUTION INTRAVENOUS at 10:41

## 2023-10-20 RX ADMIN — APIXABAN 2.5 MG: 2.5 TABLET, FILM COATED ORAL at 10:29

## 2023-10-20 RX ADMIN — VANCOMYCIN 1250 MG: 1.75 INJECTION, SOLUTION INTRAVENOUS at 05:36

## 2023-10-20 RX ADMIN — PIPERACILLIN SODIUM AND TAZOBACTAM SODIUM 4.5 G: 4; .5 INJECTION, SOLUTION INTRAVENOUS at 01:56

## 2023-10-20 RX ADMIN — OXYBUTYNIN CHLORIDE 5 MG: 5 TABLET ORAL at 10:30

## 2023-10-20 RX ADMIN — DEXAMETHASONE 6 MG: 6 TABLET ORAL at 10:32

## 2023-10-20 RX ADMIN — KETOROLAC TROMETHAMINE 1 DROP: 0.5 SOLUTION OPHTHALMIC at 13:24

## 2023-10-20 RX ADMIN — DEXAMETHASONE 6 MG: 6 TABLET ORAL at 17:47

## 2023-10-20 RX ADMIN — PIPERACILLIN SODIUM AND TAZOBACTAM SODIUM 4.5 G: 4; .5 INJECTION, SOLUTION INTRAVENOUS at 17:46

## 2023-10-20 RX ADMIN — DORZOLAMIDE HYDROCHLORIDE 1 DROP: 20 SOLUTION/ DROPS OPHTHALMIC at 21:00

## 2023-10-20 RX ADMIN — KETOROLAC TROMETHAMINE 1 DROP: 0.5 SOLUTION OPHTHALMIC at 17:55

## 2023-10-20 RX ADMIN — EXEMESTANE 25 MG: 25 TABLET ORAL at 10:32

## 2023-10-20 RX ADMIN — IPRATROPIUM BROMIDE AND ALBUTEROL SULFATE 3 ML: 2.5; .5 SOLUTION RESPIRATORY (INHALATION) at 06:53

## 2023-10-20 RX ADMIN — TIZANIDINE 2 MG: 2 TABLET ORAL at 07:52

## 2023-10-20 RX ADMIN — ASPIRIN 81 MG CHEWABLE TABLET 81 MG: 81 TABLET CHEWABLE at 10:30

## 2023-10-20 RX ADMIN — Medication 324 MG: at 17:47

## 2023-10-20 RX ADMIN — LATANOPROST 1 DROP: 50 SOLUTION OPHTHALMIC at 21:05

## 2023-10-20 RX ADMIN — Medication 2000 UNITS: at 10:32

## 2023-10-20 RX ADMIN — PANTOPRAZOLE SODIUM 40 MG: 40 TABLET, DELAYED RELEASE ORAL at 05:36

## 2023-10-20 RX ADMIN — KETOROLAC TROMETHAMINE 1 DROP: 0.5 SOLUTION OPHTHALMIC at 21:00

## 2023-10-20 RX ADMIN — OXYCODONE HYDROCHLORIDE 5 MG: 5 TABLET ORAL at 10:30

## 2023-10-20 RX ADMIN — ROSUVASTATIN CALCIUM 20 MG: 20 TABLET, COATED ORAL at 10:29

## 2023-10-20 RX ADMIN — OXYBUTYNIN CHLORIDE 5 MG: 5 TABLET ORAL at 21:05

## 2023-10-20 RX ADMIN — DORZOLAMIDE HYDROCHLORIDE 1 DROP: 20 SOLUTION/ DROPS OPHTHALMIC at 10:34

## 2023-10-20 RX ADMIN — IRON SUCROSE 200 MG: 20 INJECTION, SOLUTION INTRAVENOUS at 21:05

## 2023-10-20 RX ADMIN — OXYCODONE HYDROCHLORIDE AND ACETAMINOPHEN 500 MG: 500 TABLET ORAL at 10:30

## 2023-10-20 RX ADMIN — IPRATROPIUM BROMIDE AND ALBUTEROL SULFATE 3 ML: 2.5; .5 SOLUTION RESPIRATORY (INHALATION) at 20:33

## 2023-10-20 RX ADMIN — KETOROLAC TROMETHAMINE 1 DROP: 0.5 SOLUTION OPHTHALMIC at 07:52

## 2023-10-20 ASSESSMENT — COGNITIVE AND FUNCTIONAL STATUS - GENERAL
MOVING FROM LYING ON BACK TO SITTING ON SIDE OF FLAT BED WITH BEDRAILS: A LITTLE
TOILETING: A LOT
MOVING FROM LYING ON BACK TO SITTING ON SIDE OF FLAT BED WITH BEDRAILS: A LITTLE
STANDING UP FROM CHAIR USING ARMS: A LITTLE
CLIMB 3 TO 5 STEPS WITH RAILING: A LOT
STANDING UP FROM CHAIR USING ARMS: A LITTLE
TURNING FROM BACK TO SIDE WHILE IN FLAT BAD: A LITTLE
PERSONAL GROOMING: A LOT
WALKING IN HOSPITAL ROOM: A LOT
HELP NEEDED FOR BATHING: A LOT
DRESSING REGULAR UPPER BODY CLOTHING: A LOT
TURNING FROM BACK TO SIDE WHILE IN FLAT BAD: A LITTLE
DRESSING REGULAR LOWER BODY CLOTHING: A LOT
TOILETING: A LOT
PERSONAL GROOMING: A LOT
WALKING IN HOSPITAL ROOM: A LOT
DRESSING REGULAR LOWER BODY CLOTHING: A LOT
DAILY ACTIVITIY SCORE: 14
DRESSING REGULAR UPPER BODY CLOTHING: A LOT
DAILY ACTIVITIY SCORE: 14
HELP NEEDED FOR BATHING: A LOT
MOVING TO AND FROM BED TO CHAIR: A LITTLE
CLIMB 3 TO 5 STEPS WITH RAILING: TOTAL
MOBILITY SCORE: 16
MOBILITY SCORE: 15
MOVING TO AND FROM BED TO CHAIR: A LITTLE

## 2023-10-20 ASSESSMENT — PAIN SCALES - PAIN ASSESSMENT IN ADVANCED DEMENTIA (PAINAD)
TOTALSCORE: 2
CONSOLABILITY: NO NEED TO CONSOLE
BODYLANGUAGE: RELAXED
NEGVOCALIZATION: OCCASIONAL MOAN/GROAN, LOW SPEECH, NEGATIVE/DISAPPROVING QUALITY
FACIALEXPRESSION: SAD, FRIGHTENED, FROWN
BREATHING: NORMAL

## 2023-10-20 ASSESSMENT — PAIN - FUNCTIONAL ASSESSMENT
PAIN_FUNCTIONAL_ASSESSMENT: 0-10
PAIN_FUNCTIONAL_ASSESSMENT: 0-10

## 2023-10-20 ASSESSMENT — PAIN SCALES - GENERAL
PAINLEVEL_OUTOF10: 0 - NO PAIN
PAINLEVEL_OUTOF10: 0 - NO PAIN
PAINLEVEL_OUTOF10: 3

## 2023-10-20 NOTE — CARE PLAN
Problem: Respiratory  Goal: Clear secretions with interventions this shift  Outcome: Progressing  Goal: Minimize anxiety/maximize coping throughout shift  Outcome: Progressing  Goal: Minimal/no exertional discomfort or dyspnea this shift  Outcome: Progressing  Goal: Verbalize decreased shortness of breath this shift  Outcome: Progressing  Goal: Wean oxygen to maintain O2 saturation per order/standard this shift  Outcome: Progressing  Goal: Increase self care and/or family involvement in next 24 hours  Outcome: Progressing     Problem: Respiratory  Goal: No signs of respiratory distress (eg. Use of accessory muscles. Peds grunting)  Outcome: Met  Goal: Patent airway maintained this shift  Outcome: Met  Goal: Tolerate mechanical ventilation evidenced by VS/agitation level this shift  Outcome: Met  Goal: Tolerate pulmonary toileting this shift  Outcome: Met

## 2023-10-20 NOTE — NURSING NOTE
ASSUMED CARE OF PATIENT.  RESTING QUIETLY IN BED.  C/O PAIN IN L ANKLE - MUSCLE SPASMS.  REPOSITIONED AND ADJUSTED PRAFO BOOT.  RESPIRATIONS EVEN AND UNLABORED ON RA.  SINUS CHE ON MONITOR,  CALL LIGHT AND COMMONLY USED ITEMS IN REACH.  BED LOCKED AND IN LOW POSITION.

## 2023-10-20 NOTE — CARE PLAN
Problem: Fall/Injury  Goal: Not fall by end of shift  Outcome: Progressing  Goal: Be free from injury by end of the shift  Outcome: Progressing  Goal: Verbalize understanding of personal risk factors for fall in the hospital  Outcome: Progressing  Goal: Verbalize understanding of risk factor reduction measures to prevent injury from fall in the home  Outcome: Progressing  Goal: Use assistive devices by end of the shift  Outcome: Progressing  Goal: Pace activities to prevent fatigue by end of the shift  Outcome: Progressing     Problem: Pain  Goal: My pain/discomfort is manageable  Outcome: Progressing     Problem: Safety  Goal: Patient will be injury free during hospitalization  Outcome: Progressing  Goal: I will remain free of falls  Outcome: Progressing     Problem: Daily Care  Goal: Daily care needs are met  Outcome: Progressing     Problem: Psychosocial Needs  Goal: Demonstrates ability to cope with hospitalization/illness  Outcome: Progressing  Goal: Collaborate with me, my family, and caregiver to identify my specific goals  Outcome: Progressing     Problem: Discharge Barriers  Goal: My discharge needs are met  Outcome: Progressing     Problem: Skin  Goal: Decreased wound size/increased tissue granulation at next dressing change  Outcome: Progressing  Goal: Participates in plan/prevention/treatment measures  Outcome: Progressing  Goal: Prevent/manage excess moisture  Outcome: Progressing  Goal: Prevent/minimize sheer/friction injuries  Outcome: Progressing  Goal: Promote/optimize nutrition  Outcome: Progressing  Goal: Promote skin healing  Outcome: Progressing     Problem: Respiratory  Goal: Clear secretions with interventions this shift  Outcome: Progressing  Goal: Minimize anxiety/maximize coping throughout shift  Outcome: Progressing  Goal: Minimal/no exertional discomfort or dyspnea this shift  Outcome: Progressing  Goal: Verbalize decreased shortness of breath this shift  Outcome: Progressing  Goal:  Wean oxygen to maintain O2 saturation per order/standard this shift  Outcome: Progressing  Goal: Increase self care and/or family involvement in next 24 hours  Outcome: Progressing     Problem: Bathing  Goal: STG - Patient will bathe body UB/LB independent with AE as needed  Outcome: Progressing     Problem: Dressings Lower Extremities  Goal: LTG - Patient will utilize adaptive techniques/equipment to dress lower body independently  Outcome: Progressing     Problem: Toileting  Goal: LTG - Patient will demonstrate use of appropriate intervention for safe toileting independent with 2ww  Outcome: Progressing   The patient's goals for the shift include pain management    The clinical goals for the shift include blood pressure control, increase ambulation    Over the shift, the patient did not make progress toward the following goals. Barriers to progression include pain from infection in foot. Recommendations to address these barriers include Repositioning for comfort and pain medication.

## 2023-10-20 NOTE — PROGRESS NOTES
Physical Therapy                 Therapy Communication Note    Patient Name: Samira López  MRN: [id number]  Today's Date: 10/20/2023     Discipline: Physical Therapy    Missed Visit Reason: Missed Visit Reason: Patient refused (Pt politely declined tx stating she wanted to get out of bed, walk and sit in the chair, but that it was too cold in her room. Her room was cold, thermostat was on highest possible temperature, and the vent was blowing cold air.)    Missed Time: Attempt

## 2023-10-20 NOTE — PROGRESS NOTES
Samira López is a 82 y.o. female on day 5 of admission presenting with Acute respiratory failure (CMS/HCC).    Subjective   Interval History:   Remains afebrile.  Room entered, patient sleeping and resting comfortably- not awakened.  No respiratory distress noted  Discussed with nursing      Objective   Range of Vitals (last 24 hours)  Heart Rate:  [52-77]   Temp:  [35.9 °C (96.6 °F)-36.4 °C (97.5 °F)]   Resp:  [18-21]   BP: (146-176)/(50-82)   Weight:  [85.7 kg (188 lb 15 oz)]   SpO2:  [91 %-98 %]   Daily Weight  10/20/23 : 85.7 kg (188 lb 15 oz)    Body mass index is 26.73 kg/m².    Physical Exam  General: No acute distress   Psych: unable to assess  Head: Anicteric   ENT: Midline nasal septum   Chest: no respiratory distress, no tachypnea      Antibiotics  piperacillin-tazobactam-dextrose (Zosyn) IV 3.375 g  vancomycin-diluent combo no.1 (Xellia) IVPB 1,250 mg  acetaminophen (Tylenol) tablet 650 mg  furosemide (Lasix) injection 40 mg  dexAMETHasone (Decadron) injection 10 mg  amLODIPine (Norvasc) tablet 5 mg  apixaban (Eliquis) tablet 2.5 mg  clopidogrel (Plavix) tablet 75 mg  ketorolac (Acular) 0.5 % ophthalmic solution 1 drop  metoprolol succinate XL (Toprol-XL) 24 hr tablet 50 mg  pantoprazole (ProtoNix) EC tablet 40 mg  rosuvastatin (Crestor) tablet 20 mg  aspirin chewable tablet 81 mg  calcium carbonate (Tums) chewable tablet 500 mg  ferrous gluconate (Fergon) 324 (38 Fe) mg tablet 324 mg  omega 3-dha-epa-fish oil 1,200 (144-216) mg capsule 3,600 mg  furosemide (Lasix) tablet 40 mg  oxygen (O2) therapy  cholecalciferol (Vitamin D-3) capsule 50 mcg  biotin tablet 1,000 mcg  clobetasol (Temovate) 0.05 % ointment 1 Application  lisinopril tablet 20 mg  palbociclib (Ibrance) tablet 100 mg  sodium hypochlorite (Dakin's HALF-Strength) 0.25 % external solution  palbociclib (Ibrance) tablet 100 mg  acetaminophen (Tylenol) tablet 325 mg  ascorbic acid (Vitamin C) tablet 500 mg  diphenhydrAMINE (Sominex) tablet 25  mg  exemestane (Aromasin) tablet 25 mg  latanoprost (Xalatan) 0.005 % ophthalmic solution 1 drop  dorzolamide (Trusopt) 2 % ophthalmic solution 1 drop  cholecalciferol (Vitamin D-3) tablet 125 mcg  pantoprazole (ProtoNix) EC tablet 40 mg  oxybutynin (Ditropan) tablet 5 mg  palbociclib (Ibrance) capsule 125 mg  tiZANidine (Zanaflex) tablet 2 mg  piperacillin-tazobactam-dextrose (Zosyn) IV 4.5 g  ipratropium-albuteroL (Duo-Neb) 0.5-2.5 mg/3 mL nebulizer solution 3 mL  dexAMETHasone (Decadron) tablet 6 mg  remdesivir (Veklury) 200 mg in sodium chloride 0.9% 250 mL IV  remdesivir (Veklury) 100 mg in sodium chloride 0.9% 250 mL IV  vancomycin-diluent combo no.1 (Xellia) IVPB 750 mg  acetaminophen (Tylenol) tablet 650 mg  acetaminophen (Tylenol) oral liquid 650 mg  acetaminophen (Tylenol) suppository 650 mg  polyethylene glycol (Glycolax, Miralax) packet 17 g  benzocaine-menthol (Cepastat Sore Throat) 15-3.6 mg lozenge 1 lozenge  dextromethorphan-guaifenesin (Robitussin DM)  mg/5 mL oral liquid 5 mL  guaiFENesin (Mucinex) 12 hr tablet 600 mg  dextrose 50 % injection 25 g  glucagon (Glucagen) injection 1 mg  dextrose 10 % in water (D10W) infusion  insulin lispro (HumaLOG) injection 0-15 Units  piperacillin-tazobactam-dextrose (Zosyn) IV 4.5 g  ipratropium-albuteroL (Duo-Neb) 0.5-2.5 mg/3 mL nebulizer solution 3 mL  lidocaine (Xylocaine) 10 mg/mL (1 %) injection 10 mg  alteplase (Cathflo Activase) injection 2 mg      oxyCODONE (Roxicodone) immediate release tablet  polyethylene glycol (Glycolax, Miralax) packet  oxyCODONE (Roxicodone) immediate release tablet 5 mg  iron sucrose (Venofer) 200 mg in sodium chloride 0.9% 100 mL IVPB  iron sucrose (Venofer) injection 200 mg  oxygen (O2) therapy  vancomycin-diluent combo no.1 (Xellia) IVPB 1 g  polyethylene glycol (Glycolax, Miralax) packet 17 g  tiZANidine (Zanaflex) tablet 2 mg  dilTIAZem (Cardizem) 125 mg in dextrose 5% 125 mL (1 mg/mL) infusion (premix)  dilTIAZem  (Cardizem) injection 15 mg  vancomycin-diluent combo no.1 (Xellia) IVPB 1,250 mg  cholecalciferol (Vitamin D-3) tablet 2,000 Units      Relevant Results  Labs  Results from last 72 hours   Lab Units 10/20/23  0157 10/19/23  0136 10/18/23  0606   WBC AUTO x10*3/uL 7.9 6.1 10.2   HEMOGLOBIN g/dL 8.6* 7.9* 9.0*   HEMATOCRIT % 28.4* 25.9* 28.7*   PLATELETS AUTO x10*3/uL 211 168 211       Results from last 72 hours   Lab Units 10/20/23  0157 10/19/23  0136 10/18/23  0606   SODIUM mmol/L 139 140 142   POTASSIUM mmol/L 3.7 3.4 3.4   CHLORIDE mmol/L 108* 94* 105   CO2 mmol/L 21* 21* 27   BUN mg/dL 18 17 19   CREATININE mg/dL 0.60 0.70 0.70   GLUCOSE mg/dL 170* 334* 169*   CALCIUM mg/dL 8.0* 7.9* 9.3   ANION GAP mmol/L 10 >19* 10   EGFR mL/min/1.73m*2 90 87 87           Estimated Creatinine Clearance: 88.3 mL/min (by C-G formula based on SCr of 0.6 mg/dL).  C-Reactive Protein   Date Value Ref Range Status   10/16/2023 5.90 (H) 0.00 - 2.00 mg/dL Final     CRP   Date Value Ref Range Status   09/09/2023 6.3 (H) 0 - 2.0 MG/DL Final     Comment:     Performed at [address] [address]   04/24/2020 0.18 mg/dL Final     Comment:     REF VALUE  < 1.00       Microbiology  Susceptibility data from last 14 days.  Collected Specimen Info Organism Aztreonam Cefepime Ceftazidime Ciprofloxacin Clindamycin Erythromycin Gentamicin Levofloxacin Oxacillin Piperacillin/Tazobactam Tetracycline Tobramycin Trimethoprim/Sulfamethoxazole Vancomycin   10/16/23 Tissue/Biopsy from Diabetic Foot Ulcer Methicillin Resistant Staphylococcus aureus (MRSA)     S R   R  R  R S     Pseudomonas aeruginosa S S S S   S S  S  S     10/15/23 Tissue/Biopsy from Wound/Tissue Pseudomonas aeruginosa S S S S   S S  S  S       Mixed Gram-Positive and Gram-Negative Bacteria                   MRSA PCR negative  Wound culture sent 10/15/2023 with rare polymorphonuclear leukocytes, mixed gram-positive and gram-negative bacteria, pansensitive  Pseudomonas  Wound culture sent 10/16/2023 pending with Pseudomonas, MRSA  Blood cultures finalized with no growth - sent 10/15/2023  Imaging  Reviewed    Assessment/Plan     Acute on chronic respiratory failure  Coronavirus infection  Marginal leukocytosis-resolved  Left diabetic foot ulcer-Albrecht grade 3  Possible left foot cellulitis-culture growing Pseudomonas, gram-positive cocci       Continue dexamethasone-total of 10 days  Completed remdesivir course  Supplemental oxygen as needed  Local care  Glycemic control  Continue vancomycin-MRSA coverage  Continue Zosyn-pseudomonas coverage  Follow-up finalized wound culture  Supportive care  Podiatry following  Monitor temperature and WBC  Droplet plus precautions  Discharge plan unless finalized culture dictates otherwise: Zosyn 4.5 gm IV q8h for a total of 2 weeks through 10/30/23, vancomycin IV as ordered through 10/30/23, CBC with diff/CMP/vancomycin trough weekly.  Discharge rec updated.   Discussed with primary and Dr Kira CHILDERS Staff, APRN-CNP

## 2023-10-20 NOTE — CARE PLAN
Problem: Fall/Injury  Goal: Not fall by end of shift  Outcome: Met  Goal: Be free from injury by end of the shift  Outcome: Progressing  Goal: Verbalize understanding of personal risk factors for fall in the hospital  Outcome: Progressing  Goal: Verbalize understanding of risk factor reduction measures to prevent injury from fall in the home  Outcome: Progressing  Goal: Use assistive devices by end of the shift  Outcome: Progressing  Goal: Pace activities to prevent fatigue by end of the shift  Outcome: Progressing   The patient's goals for the shift include pain management    The clinical goals for the shift include blood pressure control, increase ambulation    Over the shift, the patient did not make progress toward the following goals. Barriers to progression include low HR. Recommendations to address these barriers include cardio consult.    Problem: Dressings Lower Extremities  Goal: LTG - Patient will utilize adaptive techniques/equipment to dress lower body independently  Outcome: Not Progressing  Note: Patient does not dress self or try to perform task independently.     Problem: Toileting  Goal: LTG - Patient will demonstrate use of appropriate intervention for safe toileting independent with 2ww  Outcome: Not Progressing  Note: Refuses toileting, prefers purewick.

## 2023-10-20 NOTE — NURSING NOTE
Assumed care of patient from dayshift nurse. Patient resting comfortably in bed at this time with no complaints of pain and or discomfort. Per dayshift nurse patient got up multiple times throughout the day to use the bedside commode. Patient much more alert today than compared to last night when I assessed the patient. Podiatry physician changed dressing to patient's LLE earlier in the day, no signs of drainage and or bleeding. Nasal cannula in place, set at 2L of oxygen. Maintenance fluids infusing through patient's left upper arm double-lumen PICC; other lumen flushes and draws back blood. Bed in lowest position and locked, bed alarm on and functioning, and call bell and personal belongings within reach. No further interventions at this time.

## 2023-10-20 NOTE — NURSING NOTE
Dr oDan here for consult.    Unable to control temp properly in room and patient is cold.  Will move patient once room becomes available.

## 2023-10-20 NOTE — PROGRESS NOTES
[facility] heard from care coordinator following pts  that choice has been made by this pt and her pt for [facility], this was also confirmed by bedside nurse for this pt. [facility] sent referral to facility via careport, await response on if they can accept, a precert will be needed.     Per ID ARIAS Burris pt to dc on IV Vanco through 10/30.

## 2023-10-20 NOTE — NURSING NOTE
Patient resting comfortably in bed at this time, no signs of pain and or discomfort observed. No need for interventions at this time.

## 2023-10-20 NOTE — PROGRESS NOTES
Occupational Therapy                 Therapy Communication Note    Patient Name: Samira López  MRN: [id number]  Today's Date: 10/20/2023     Discipline: Occupational Therapy    Missed Visit Reason: Missed Visit Reason: Patient refused    Missed Time: Attempt    Comment: Pt requesting to rest. Pt educated on therapy goals and continued to decline

## 2023-10-20 NOTE — PROGRESS NOTES
Vancomycin Dosing by Pharmacy- FOLLOW UP    Samira López is a 82 y.o. year old female who Pharmacy has been consulted for vancomycin dosing for CNS/meningoencephalitis. Based on the patient's indication and renal status this patient is being dosed based on a goal AUC of 400-600.     Renal function is currently stable.    Current dose Vancomycin 1250mg q24hr    Visit Vitals  /58 (BP Location: Left arm, Patient Position: Lying)   Pulse 77   Temp 35.9 °C (96.6 °F) (Temporal)   Resp 18        Lab Results   Component Value Date    CREATININE 0.60 10/20/2023    CREATININE 0.70 10/19/2023    CREATININE 0.70 10/18/2023    CREATININE 0.80 10/17/2023          I/O last 3 completed shifts:  In: 840 (9.8 mL/kg) [P.O.:840]  Out: - (0 mL/kg)   Weight: 85.7 kg       Lab Results   Component Value Date    PATIENTTEMP 37.0 05/13/2023        Assessment/Plan    Within goal AUC range. Continue current vancomycin regimen.    This dosing regimen is predicted by [employer] to result in the following pharmacokinetic parameters:  Loading dose: N/A  Regimen: 1250 mg IV every 24 hours.  Start time: 05:36 on 10/21/2023  Exposure target: AUC24 (range)500-600 mg/L.hr   AUC24,ss: 413 mg/L.hr  Probability of AUC24 > 400: 60 %  Ctrough,ss: 11.2 mg/L  Probability of Ctrough,ss > 20: 0 %  Probability of nephrotoxicity (Lodise REUBEN 2009): 7 %  The next level will be obtained on 10/23/23 at 0600. May be obtained sooner if clinically indicated.   Will continue to monitor renal function daily while on vancomycin and order serum creatinine at least every 48 hours if not already ordered.  Follow for continued vancomycin needs, clinical response, and signs/symptoms of toxicity.       Marga Collazo [facility]

## 2023-10-20 NOTE — NURSING NOTE
Resting quietly in bed with O2 via n/c intact and functioning.  Pain well under control.,  dressing to L foot D&I.  Prafo boots in place.  Call light in reach.

## 2023-10-20 NOTE — PROGRESS NOTES
Samira López is a 82 y.o. female on day 5 of admission presenting with Acute respiratory failure (CMS/HCC).    Subjective   Patient seen and examined.  Resting in bed in no acute distress.  Awake alert oriented x 3.  No complaints.  No dizziness with low heart rate.  No chest pain, palpitations.  No shortness of breath, cough.  No fevers, chills or sweats.  Left foot pain controlled.      Objective     Physical Exam  Constitutional:       General: She is not in acute distress.     Appearance: She is obese. She is not ill-appearing, toxic-appearing or diaphoretic.   HENT:      Head: Normocephalic and atraumatic.      Right Ear: Tympanic membrane normal.      Left Ear: Tympanic membrane normal.      Nose: Nose normal.      Mouth/Throat:      Mouth: Mucous membranes are moist.      Pharynx: Oropharynx is clear.   Eyes:      Extraocular Movements: Extraocular movements intact.      Conjunctiva/sclera: Conjunctivae normal.      Pupils: Pupils are equal, round, and reactive to light.   Cardiovascular:      Rate and Rhythm: Regular rhythm. Bradycardia present.      Pulses: Normal pulses.      Heart sounds: Normal heart sounds.   Pulmonary:      Effort: Pulmonary effort is normal. No respiratory distress.      Breath sounds: Normal breath sounds. No wheezing or rales.      Comments: Respirations even and unlabored on 2 liters nasal cannula (home oxygen)  Abdominal:      General: Bowel sounds are normal. There is no distension.      Palpations: Abdomen is soft.      Tenderness: There is no abdominal tenderness. There is no guarding.   Genitourinary:     Comments: /rectal examination deferred  Musculoskeletal:         General: Swelling present. Normal range of motion.      Cervical back: Normal range of motion and neck supple.      Right lower leg: Edema present.      Left lower leg: Edema present.      Comments: Decreased edema  Left lower extremity unna boot, left foot TMA dressing intact   Skin:     General: Skin is  "warm and dry.      Capillary Refill: Capillary refill takes less than 2 seconds.      Comments: Left lower extremity unna boot, left foot TMA dressing intact   Neurological:      General: No focal deficit present.      Mental Status: She is alert and oriented to person, place, and time.   Psychiatric:         Mood and Affect: Mood normal.         Behavior: Behavior normal.       Last Recorded Vitals  Blood pressure 179/74, pulse 64, temperature 36.7 °C (98.1 °F), temperature source Temporal, resp. rate 22, height 1.791 m (5' 10.5\"), weight 85.7 kg (188 lb 15 oz), SpO2 97 %.    Intake/Output last 3 Shifts:  I/O last 3 completed shifts:  In: 840 (9.8 mL/kg) [P.O.:840]  Out: - (0 mL/kg)   Weight: 85.7 kg     Telemetry sinus bradycardia rate 40's up to 60's     Relevant Results  Results for orders placed or performed during the hospital encounter of 10/15/23 (from the past 24 hour(s))   POCT GLUCOSE   Result Value Ref Range    POCT Glucose 217 (H) 74 - 99 mg/dL   POCT GLUCOSE   Result Value Ref Range    POCT Glucose 249 (H) 74 - 99 mg/dL   Basic Metabolic Panel   Result Value Ref Range    Glucose 170 (H) 65 - 99 mg/dL    Sodium 139 133 - 145 mmol/L    Potassium 3.7 3.4 - 5.1 mmol/L    Chloride 108 (H) 97 - 107 mmol/L    Bicarbonate 21 (L) 24 - 31 mmol/L    Urea Nitrogen 18 8 - 25 mg/dL    Creatinine 0.60 0.40 - 1.60 mg/dL    eGFR 90 >60 mL/min/1.73m*2    Calcium 8.0 (L) 8.5 - 10.4 mg/dL    Anion Gap 10 <=19 mmol/L   CBC   Result Value Ref Range    WBC 7.9 4.4 - 11.3 x10*3/uL    nRBC 0.3 (H) 0.0 - 0.0 /100 WBCs    RBC 2.90 (L) 4.00 - 5.20 x10*6/uL    Hemoglobin 8.6 (L) 12.0 - 16.0 g/dL    Hematocrit 28.4 (L) 36.0 - 46.0 %    MCV 98 80 - 100 fL    MCH 29.7 26.0 - 34.0 pg    MCHC 30.3 (L) 32.0 - 36.0 g/dL    RDW 16.7 (H) 11.5 - 14.5 %    Platelets 211 150 - 450 x10*3/uL    MPV 10.8 7.5 - 11.5 fL   Vancomycin   Result Value Ref Range    Vancomycin 13.9 10.0 - 20.0 ug/mL   POCT GLUCOSE   Result Value Ref Range    POCT " Glucose 153 (H) 74 - 99 mg/dL   POCT GLUCOSE   Result Value Ref Range    POCT Glucose 215 (H) 74 - 99 mg/dL     NM bone whole body    Result Date: 10/19/2023  Interpreted By:  Tyshawn Campbell, STUDY: NM BONE WHOLE BODY; 10/19/2023 3:45 pm   INDICATION: Signs/Symptoms:H/o breast cancer, restaging..   COMPARISON: April 28, 2023   ACCESSION NUMBER(S): [id number]   ORDERING CLINICIAN: TIRSO LEIVA   TECHNIQUE: The patient was injected with 23 millicurie of Tc-99m MDP. Subsequently,   delayed anterior and posterior scintigrams of the skeletal system as well as oblique scintigrams of the thorax and bilateral lateral scintigrams of the skull and cervical spine were acquired.   FINDINGS: There is new abnormal uptake within the mid diaphysis of the right humerus. There is new uptake within the posterior aspect of the left 11th rib as well as the medial aspect of the left 7th rib. Abnormal uptake is again noted involving the left intertrochanteric region, the xiphoid process, the T3 vertebral body the left 5th rib, the T5 vertebral body. Abnormal uptake is again noted involving the 1st right sterno clavicular joint. Multiple foci are also noted within the cervical spine. Increased uptake is noted involving the bilateral shoulders and bilateral knees and bilateral ankles. The kidneys and bladder are unremarkable. The soft tissues are unremarkable.       New abnormal uptake involving the diaphysis of the right humerus as well as the left 7th rib and left 11th rib concerning for new metastatic foci.   Previously identified areas of abnormal uptake again noted   Signed by: Tyshawn Campbell 10/19/2023 4:17 PM Dictation workstation:   [id number]     Scheduled medications  apixaban, 2.5 mg, oral, BID  ascorbic acid, 500 mg, oral, Daily with breakfast  aspirin, 81 mg, oral, Daily  cholecalciferol, 2,000 Units, oral, Daily  dexAMETHasone, 6 mg, oral, BID after meals  dorzolamide, 1 drop, Both Eyes, BID  exemestane, 25 mg, oral,  Daily  ferrous gluconate, 324 mg, oral, BID after meals  insulin lispro, 0-15 Units, subcutaneous, TID with meals  ipratropium-albuteroL, 3 mL, nebulization, TID  iron sucrose, 200 mg, intravenous, Nightly  ketorolac, 1 drop, Both Eyes, 4x daily  latanoprost, 1 drop, Both Eyes, Nightly  lidocaine, 1 mL, infiltration, Once  oxybutynin, 5 mg, oral, BID  pantoprazole, 40 mg, oral, Daily before breakfast  piperacillin-tazobactam, 4.5 g, intravenous, q8h  polyethylene glycol, 17 g, oral, Daily  rosuvastatin, 20 mg, oral, Daily  vancomycin-diluent combo no.1, 1,250 mg, intravenous, q24h      Continuous medications  oxygen, , Last Rate: 3 L/min (10/17/23 1115)      PRN medications  PRN medications: acetaminophen **OR** acetaminophen **OR** acetaminophen, acetaminophen, alteplase, benzocaine-menthol, dextromethorphan-guaifenesin, dextrose 10 % in water (D10W), dextrose, diphenhydrAMINE, glucagon, guaiFENesin, oxyCODONE, polyethylene glycol, tiZANidine    ASSESSMENT:  Generalized weakness  Asthenia  Recurrent falls  COVID-19  Acute (resolved) on chronic hypoxic respiratory failure  History of tobacco dependence  Left foot cellulitis  Left foot diabetic wound pseudomonas, MRSA  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  Discontinue beta blocker.  Consult Cardiology for hypertension, atrial fibrillation, bradycardia.  Monitor telemetry.  Continue oral anticoagulation Eliquis for stroke prevention.  Respiratory status, pulse oximetry stable on baseline 2 liters nasal cannula.  IV Remdesivir course complete.  P.o Dexamethasone.  Pulmonary hygiene.  Encourage incentive spirometer use 10x/hour while awake.  Pulmonary hygiene.  Increase activity as  tolerated.  Maintain isolation precautions per protocol.  Podiatry, Infectious disease following, input appreciated.  Maintain left lower extremity Unna boot and left foot wound dressing per Podiatry.  Left foot wound culture + Pseudomonas a + MRSA.  2/2 blood cultures no growth.  Afebrile.  Non toxic appearing.  Pain controlled.  Continue IV antibiotics per Infectious disease -- IV Vancomycin (pharmacy dosing), Zosyn thru 10/30/2023.  Monitor Vancomycin level and renal function.  Continue analgesics.  Monitor point of care glucose.  Continue sliding scale insulin.  Adjust as needed for glycemic control.  PT/OT.  Fall precautions.  Deep vein thrombosis prophylaxis.  Eliquis.  GI prophylaxis Ppi Protonix.  Supportive care.  Patient reassured.  Case management following for discharge planning.  Awaiting discharge arrangements.  Anticipate discharge when cleared by Cardiology and when arrangements are made by case management.  Discussed with Dr. Ríos.    Olivia Parrish, APRN-CNP

## 2023-10-20 NOTE — CONSULTS
Consults  History Of Present Illness:    Samira López is a 82 y.o. female presenting with .     Last Recorded Vitals:  Vitals:    10/20/23 0357 10/20/23 0654 10/20/23 0804 10/20/23 1548   BP: 157/64  176/58 179/74   BP Location: Left arm  Left arm Left arm   Patient Position: Lying  Lying Lying   Pulse: 62 65 77 64   Resp: 21 20 18 22   Temp: 36.4 °C (97.5 °F)  35.9 °C (96.6 °F) 36.7 °C (98.1 °F)   TempSrc: Oral  Temporal Temporal   SpO2: 91%  95% 97%   Weight: 85.7 kg (188 lb 15 oz)      Height:           Last Labs:  CBC - 10/20/2023:  1:57 AM  7.9 8.6 211    28.4      CMP - 10/20/2023:  1:57 AM  8.0 6.7 22 --- 1.8   1.5 3.1 12 86      PTT - 5/12/2023:  9:02 PM  1.1   11.9 49.6     BNP   Date/Time Value Ref Range Status   04/30/2019 11:00 AM 24 0 - 99 pg/mL Final     Comment:     .  <100 pg/mL - Heart failure unlikely  100-299 pg/mL - Intermediate probability of acute heart  .               failure exacerbation. Correlate with clinical  .               context and patient history.    >=300 pg/mL - Heart Failure likely. Correlate with clinical  .               context and patient history.  BNP testing is performed using different testing   methodology at [facility] than at other   system hospitals. Direct result comparisons should   only be made within the same method.     02/23/2019 05:52 PM 38 0 - 99 pg/mL Final     Comment:     .  <100 pg/mL - Heart failure unlikely  100-299 pg/mL - Intermediate probability of acute heart  .               failure exacerbation. Correlate with clinical  .               context and patient history.    >=300 pg/mL - Heart Failure likely. Correlate with clinical  .               context and patient history.  BNP testing is performed using different testing   methodology at [facility] than at other   system hospitals. Direct result comparisons should   only be made within the same method.       Hemoglobin A1C   Date/Time Value Ref Range Status    01/06/2023 03:43 AM 5.9 4.0 - 6.0 % Final     Comment:     Hemoglobin A1C levels are related to mean blood glucose during the   preceding 2-3 months. The relationship table below may be used as a   general guide. Each 1% increase in HGB A1C is a reflection of an   increase in mean glucose of approximately 30 mg/dl.   Reference: Diabetes Care, volume 29, supplement 1 Jan. 2006                        HGB A1C ................. Approx. Mean Glucose   _______________________________________________   6%   ...............................  120 mg/dl   7%   ...............................  150 mg/dl   8%   ...............................  180 mg/dl   9%   ...............................  210 mg/dl   10%  ...............................  240 mg/dl  Performed at [address] [id number]     06/17/2022 05:22 AM 6.0 4.0 - 6.0 % Final     Comment:     Hemoglobin A1C levels are related to mean blood glucose during the   preceding 2-3 months. The relationship table below may be used as a   general guide. Each 1% increase in HGB A1C is a reflection of an   increase in mean glucose of approximately 30 mg/dl.   Reference: Diabetes Care, volume 29, supplement 1 Jan. 2006                        HGB A1C ................. Approx. Mean Glucose   _______________________________________________   6%   ...............................  120 mg/dl   7%   ...............................  150 mg/dl   8%   ...............................  180 mg/dl   9%   ...............................  210 mg/dl   10%  ...............................  240 mg/dl  Performed at [address] [address]       LDL Calculated   Date/Time Value Ref Range Status   09/08/2022 06:11 AM 36 (L) 65 - 130 MG/DL Final     VLDL   Date/Time Value Ref Range Status   10/26/2022 02:21 PM 20 0 - 40 mg/dL Final   06/23/2021 01:53 PM 27 0 - 40 mg/dL Final   02/25/2021 10:36 AM 23 0 - 40 mg/dL Final      Last I/O:  I/O last 3 completed  "shifts:  In: 840 (9.8 mL/kg) [P.O.:840]  Out: - (0 mL/kg)   Weight: 85.7 kg     Past Cardiology Tests (Last 3 Years):  EKG:  Electrocardiogram, 12-lead PRN ACS symptoms 10/18/2023    Echo:  No results found for this or any previous visit from the past 1095 days.    Ejection Fractions:  No results found for: \"EF\"  Cath:  No results found for this or any previous visit from the past 1095 days.    Stress Test:  No results found for this or any previous visit from the past 1095 days.    Cardiac Imaging:  No results found for this or any previous visit from the past 1095 days.      Past Medical History:  She has a past medical history of Abnormal findings on diagnostic imaging of heart and coronary circulation (07/30/2019), Abrasion, unspecified foot, initial encounter (06/15/2020), Body mass index (BMI) 31.0-31.9, adult (02/22/2021), Body mass index (BMI) 37.0-37.9, adult, Body mass index (BMI) 38.0-38.9, adult, Body mass index (BMI)30.0-30.9, adult (07/25/2022), Body mass index (BMI)30.0-30.9, adult (06/16/2021), Body mass index (BMI)30.0-30.9, adult (02/28/2022), Body mass index (BMI)30.0-30.9, adult (06/02/2022), Body mass index (BMI)30.0-30.9, adult (04/04/2022), Cellulitis of left lower limb (09/20/2022), Combined forms of age-related cataract, bilateral (07/27/2022), Dyskinesia of esophagus (12/05/2019), Elevated blood-pressure reading, without diagnosis of hypertension (11/03/2015), Epistaxis (11/25/2019), Glaucoma, Hypoxemia (08/17/2020), Idiopathic sleep related nonobstructive alveolar hypoventilation (08/17/2020), Idiopathic sleep related nonobstructive alveolar hypoventilation (08/17/2020), Local infection of the skin and subcutaneous tissue, unspecified (04/13/2020), Nipple discharge (06/02/2017), Non-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 foot with unspecified severity (CMS/[facility]) (07/12/2022), Non-pressure chronic ulcer of unspecified part of left lower leg with unspecified severity (CMS/[facility]) " (06/16/2021), Other conditions influencing health status (05/19/2015), Other disorders of electrolyte and fluid balance, not elsewhere classified (06/26/2019), Other pulmonary embolism with acute cor pulmonale (CMS/[facility]) (06/16/2021), Other specified personal risk factors, not elsewhere classified (07/31/2019), Other specified postprocedural states (06/29/2017), Other symptoms and signs involving the nervous system (08/17/2020), Pain in unspecified foot (04/29/2020), Pain in unspecified hand (07/30/2013), Pain in unspecified hand (06/22/2020), Pain in unspecified hip (06/12/2013), Pain in unspecified hip (06/22/2020), Pain, unspecified (02/28/2022), Personal history of diseases of the skin and subcutaneous tissue (02/12/2020), Personal history of other diseases of the circulatory system (06/16/2021), Personal history of other diseases of the musculoskeletal system and connective tissue (12/17/2020), Personal history of other diseases of the musculoskeletal system and connective tissue (12/04/2020), Personal history of other diseases of the nervous system and sense organs (08/17/2020), Personal history of other diseases of the respiratory system (05/19/2015), Personal history of other diseases of the respiratory system (12/23/2017), Personal history of other specified conditions (06/29/2017), Personal history of other specified conditions (07/05/2018), Personal history of other specified conditions (06/29/2017), Personal history of other specified conditions (07/31/2019), Personal history of other specified conditions (06/22/2020), Personal history of pulmonary embolism (04/06/2022), Preglaucoma, unspecified, unspecified eye, Radiculopathy, lumbar region (02/23/2022), Shortness of breath (08/08/2023), Sleep apnea, unspecified (02/12/2020), Spondylosis without myelopathy or radiculopathy, lumbar region (02/23/2022), Unspecified cataract (06/17/2020), Unspecified cataract, and Unspecified cataract.    Past Surgical  History:  She has a past surgical history that includes Cholecystectomy (06/12/2013); Other surgical history (06/17/2020); Foot surgery (06/29/2017); CT angio aorta and bilateral iliofemoral runoff w and or wo IV contrast (4/25/2020); CT guided percutaneous biopsy bone deep (12/13/2018); CT angio aorta and bilateral iliofemoral runoff w and or wo IV contrast (9/8/2022); and CT angio aorta and bilateral iliofemoral runoff w and or wo IV contrast (5/17/2023).      Social History:  She reports that she quit smoking about 23 years ago. Her smoking use included cigarettes. She has a 22.50 pack-year smoking history. She has never used smokeless tobacco. She reports current alcohol use. She reports that she does not currently use drugs.    Family History:  Family History   Problem Relation Name Age of Onset    Breast cancer Mother      Coronary artery disease Father          Allergies:  Cephalosporins, Ciprofloxacin, Codeine, and Epoetin shankar    Inpatient Medications:  Scheduled medications   Medication Dose Route Frequency    apixaban  2.5 mg oral BID    ascorbic acid  500 mg oral Daily with breakfast    aspirin  81 mg oral Daily    cholecalciferol  2,000 Units oral Daily    dexAMETHasone  6 mg oral BID after meals    dorzolamide  1 drop Both Eyes BID    exemestane  25 mg oral Daily    ferrous gluconate  324 mg oral BID after meals    insulin lispro  0-15 Units subcutaneous TID with meals    ipratropium-albuteroL  3 mL nebulization TID    iron sucrose  200 mg intravenous Nightly    ketorolac  1 drop Both Eyes 4x daily    latanoprost  1 drop Both Eyes Nightly    lidocaine  1 mL infiltration Once    oxybutynin  5 mg oral BID    pantoprazole  40 mg oral Daily before breakfast    piperacillin-tazobactam  4.5 g intravenous q8h    polyethylene glycol  17 g oral Daily    rosuvastatin  20 mg oral Daily    vancomycin-diluent combo no.1  1,250 mg intravenous q24h     PRN medications   Medication    acetaminophen    Or     acetaminophen    Or    acetaminophen    acetaminophen    alteplase    benzocaine-menthol    dextromethorphan-guaifenesin    dextrose 10 % in water (D10W)    dextrose    diphenhydrAMINE    glucagon    guaiFENesin    oxyCODONE    polyethylene glycol    tiZANidine     Continuous Medications   Medication Dose Last Rate    oxygen   3 L/min (10/17/23 1115)     Outpatient Medications:  Current Outpatient Medications   Medication Instructions    acetaminophen (TYLENOL) 325 mg, oral, Every 6 hours PRN    ascorbic acid (VITAMIN C) 500 mg, oral, Daily    cholecalciferol (VITAMIN D-3) 20 mcg, oral, Daily RT    diphenhydrAMINE (SOMINEX) 25 mg, oral, Every 8 hours PRN    dorzolamide (Trusopt) 2 % ophthalmic solution 1 drop, Both Eyes, 2 times daily    Eliquis 2.5 mg, oral, 2 times daily    esomeprazole (NexIUM 24HR) 20 mg DR capsule 1 capsule, oral, Daily    exemestane (AROMASIN) 25 mg, oral, Daily, Take after a meal.  Try to take at the same time each day.    ferrous sulfate 325 (65 Fe) MG tablet 65 mg of iron, oral, Nightly    hydrocortisone 1 % cream As needed    latanoprost (Xalatan) 0.005 % ophthalmic solution INSTILL  1 DROP INTO BOTH EYES EVERY DAY AT BEDTIME    metoprolol succinate XL (TOPROL-XL) 50 mg, oral, Daily    oxybutynin XL (Ditropan-XL) 10 mg 24 hr tablet 1 tablet, oral, Daily    oxyCODONE (ROXICODONE) 5 mg, oral, Every 8 hours PRN    oxygen (O2) gas therapy 4 L, nasal, Nightly    palbociclib (Ibrance) 100 mg tablet TAKE ONE (1) TABLET BY MOUTH ONCE A DAY ON DAYS 1-21 OF A 28-DAY CYCLE    polyethylene glycol (Glycolax, Miralax) 17 gram packet 17 g, oral, Daily, Hold for loose stool    rosuvastatin (CRESTOR) 20 mg, oral, Daily    sodium chloride 0.9 % piggyback 100 mL with piperacillin-tazobactam 4.5 gram recon soln 4.5 g 4.5 g, intravenous, Every 8 hours    tiZANidine (ZANAFLEX) 2 mg, oral, Every 6 hours PRN    [START ON 10/21/2023] vancomycin-diluent combo no.1 (Xellia) IVPB 1.25 g, intravenous, Every 24 hours        Physical Exam:       Assessment/Plan     PICC - Adult 10/16/23 Double lumen Right Brachial vein (Active)   Site Assessment Clean;Dry;Intact 10/20/23 1600   Dressing Status Clean;Dry;Occlusive 10/20/23 1600   Number of days: 4       Code Status:  DNR    I spent  minutes in the professional and overall care of this patient.        Natanael Doan MD

## 2023-10-21 LAB
ANION GAP SERPL CALC-SCNC: 8 MMOL/L
BUN SERPL-MCNC: 18 MG/DL (ref 8–25)
CALCIUM SERPL-MCNC: 7.4 MG/DL (ref 8.5–10.4)
CHLORIDE SERPL-SCNC: 111 MMOL/L (ref 97–107)
CO2 SERPL-SCNC: 20 MMOL/L (ref 24–31)
CREAT SERPL-MCNC: 0.6 MG/DL (ref 0.4–1.6)
ERYTHROCYTE [DISTWIDTH] IN BLOOD BY AUTOMATED COUNT: 16.8 % (ref 11.5–14.5)
GFR SERPL CREATININE-BSD FRML MDRD: 90 ML/MIN/1.73M*2
GLUCOSE BLD MANUAL STRIP-MCNC: 167 MG/DL (ref 74–99)
GLUCOSE BLD MANUAL STRIP-MCNC: 183 MG/DL (ref 74–99)
GLUCOSE BLD MANUAL STRIP-MCNC: 201 MG/DL (ref 74–99)
GLUCOSE SERPL-MCNC: 192 MG/DL (ref 65–99)
HCT VFR BLD AUTO: 28.8 % (ref 36–46)
HGB BLD-MCNC: 8.9 G/DL (ref 12–16)
MCH RBC QN AUTO: 29.5 PG (ref 26–34)
MCHC RBC AUTO-ENTMCNC: 30.9 G/DL (ref 32–36)
MCV RBC AUTO: 95 FL (ref 80–100)
NRBC BLD-RTO: 0.2 /100 WBCS (ref 0–0)
PLATELET # BLD AUTO: 214 X10*3/UL (ref 150–450)
PMV BLD AUTO: 10.7 FL (ref 7.5–11.5)
POTASSIUM SERPL-SCNC: 3.5 MMOL/L (ref 3.4–5.1)
RBC # BLD AUTO: 3.02 X10*6/UL (ref 4–5.2)
SODIUM SERPL-SCNC: 139 MMOL/L (ref 133–145)
WBC # BLD AUTO: 8.8 X10*3/UL (ref 4.4–11.3)

## 2023-10-21 PROCEDURE — 80048 BASIC METABOLIC PNL TOTAL CA: CPT | Performed by: NURSE PRACTITIONER

## 2023-10-21 PROCEDURE — 85027 COMPLETE CBC AUTOMATED: CPT | Performed by: NURSE PRACTITIONER

## 2023-10-21 PROCEDURE — 2500000001 HC RX 250 WO HCPCS SELF ADMINISTERED DRUGS (ALT 637 FOR MEDICARE OP): Performed by: INTERNAL MEDICINE

## 2023-10-21 PROCEDURE — 2500000004 HC RX 250 GENERAL PHARMACY W/ HCPCS (ALT 636 FOR OP/ED): Performed by: INTERNAL MEDICINE

## 2023-10-21 PROCEDURE — 82947 ASSAY GLUCOSE BLOOD QUANT: CPT

## 2023-10-21 PROCEDURE — 1200000002 HC GENERAL ROOM WITH TELEMETRY DAILY

## 2023-10-21 PROCEDURE — 2500000004 HC RX 250 GENERAL PHARMACY W/ HCPCS (ALT 636 FOR OP/ED): Performed by: NURSE PRACTITIONER

## 2023-10-21 PROCEDURE — 9420000001 HC RT PATIENT EDUCATION 5 MIN

## 2023-10-21 PROCEDURE — 94640 AIRWAY INHALATION TREATMENT: CPT

## 2023-10-21 PROCEDURE — 2500000002 HC RX 250 W HCPCS SELF ADMINISTERED DRUGS (ALT 637 FOR MEDICARE OP, ALT 636 FOR OP/ED): Performed by: INTERNAL MEDICINE

## 2023-10-21 RX ORDER — VANCOMYCIN 1.5 G/300ML
1500 INJECTION, SOLUTION INTRAVENOUS EVERY 24 HOURS
Status: DISCONTINUED | OUTPATIENT
Start: 2023-10-22 | End: 2023-10-25 | Stop reason: HOSPADM

## 2023-10-21 RX ORDER — GABAPENTIN 100 MG/1
100 CAPSULE ORAL 2 TIMES DAILY
Status: DISCONTINUED | OUTPATIENT
Start: 2023-10-21 | End: 2023-10-21

## 2023-10-21 RX ORDER — DOXEPIN HYDROCHLORIDE 10 MG/1
10 CAPSULE ORAL NIGHTLY
Status: DISCONTINUED | OUTPATIENT
Start: 2023-10-21 | End: 2023-10-21

## 2023-10-21 RX ADMIN — IPRATROPIUM BROMIDE AND ALBUTEROL SULFATE 3 ML: 2.5; .5 SOLUTION RESPIRATORY (INHALATION) at 19:28

## 2023-10-21 RX ADMIN — INSULIN LISPRO 3 UNITS: 100 INJECTION, SOLUTION INTRAVENOUS; SUBCUTANEOUS at 17:01

## 2023-10-21 RX ADMIN — ASPIRIN 81 MG CHEWABLE TABLET 81 MG: 81 TABLET CHEWABLE at 09:08

## 2023-10-21 RX ADMIN — OXYBUTYNIN CHLORIDE 5 MG: 5 TABLET ORAL at 21:48

## 2023-10-21 RX ADMIN — Medication 2000 UNITS: at 09:08

## 2023-10-21 RX ADMIN — PIPERACILLIN SODIUM AND TAZOBACTAM SODIUM 4.5 G: 4; .5 INJECTION, SOLUTION INTRAVENOUS at 17:03

## 2023-10-21 RX ADMIN — KETOROLAC TROMETHAMINE 1 DROP: 0.5 SOLUTION OPHTHALMIC at 17:01

## 2023-10-21 RX ADMIN — OXYBUTYNIN CHLORIDE 5 MG: 5 TABLET ORAL at 09:08

## 2023-10-21 RX ADMIN — IPRATROPIUM BROMIDE AND ALBUTEROL SULFATE 3 ML: 2.5; .5 SOLUTION RESPIRATORY (INHALATION) at 12:38

## 2023-10-21 RX ADMIN — ROSUVASTATIN CALCIUM 20 MG: 20 TABLET, COATED ORAL at 09:09

## 2023-10-21 RX ADMIN — EXEMESTANE 25 MG: 25 TABLET ORAL at 09:24

## 2023-10-21 RX ADMIN — Medication 324 MG: at 17:03

## 2023-10-21 RX ADMIN — KETOROLAC TROMETHAMINE 1 DROP: 0.5 SOLUTION OPHTHALMIC at 09:09

## 2023-10-21 RX ADMIN — Medication 324 MG: at 09:08

## 2023-10-21 RX ADMIN — DEXAMETHASONE 6 MG: 6 TABLET ORAL at 17:03

## 2023-10-21 RX ADMIN — LATANOPROST 1 DROP: 50 SOLUTION OPHTHALMIC at 21:48

## 2023-10-21 RX ADMIN — PIPERACILLIN SODIUM AND TAZOBACTAM SODIUM 4.5 G: 4; .5 INJECTION, SOLUTION INTRAVENOUS at 01:03

## 2023-10-21 RX ADMIN — APIXABAN 2.5 MG: 2.5 TABLET, FILM COATED ORAL at 21:49

## 2023-10-21 RX ADMIN — DORZOLAMIDE HYDROCHLORIDE 1 DROP: 20 SOLUTION/ DROPS OPHTHALMIC at 09:09

## 2023-10-21 RX ADMIN — OXYCODONE HYDROCHLORIDE AND ACETAMINOPHEN 500 MG: 500 TABLET ORAL at 09:08

## 2023-10-21 RX ADMIN — TIZANIDINE 2 MG: 2 TABLET ORAL at 01:29

## 2023-10-21 RX ADMIN — KETOROLAC TROMETHAMINE 1 DROP: 0.5 SOLUTION OPHTHALMIC at 12:02

## 2023-10-21 RX ADMIN — PANTOPRAZOLE SODIUM 40 MG: 40 TABLET, DELAYED RELEASE ORAL at 06:19

## 2023-10-21 RX ADMIN — OXYCODONE HYDROCHLORIDE 5 MG: 5 TABLET ORAL at 01:29

## 2023-10-21 RX ADMIN — VANCOMYCIN 1250 MG: 1.75 INJECTION, SOLUTION INTRAVENOUS at 06:19

## 2023-10-21 RX ADMIN — DORZOLAMIDE HYDROCHLORIDE 1 DROP: 20 SOLUTION/ DROPS OPHTHALMIC at 21:48

## 2023-10-21 RX ADMIN — PIPERACILLIN SODIUM AND TAZOBACTAM SODIUM 4.5 G: 4; .5 INJECTION, SOLUTION INTRAVENOUS at 09:08

## 2023-10-21 RX ADMIN — DEXAMETHASONE 6 MG: 6 TABLET ORAL at 09:08

## 2023-10-21 RX ADMIN — POLYETHYLENE GLYCOL 3350 17 G: 17 POWDER, FOR SOLUTION ORAL at 09:08

## 2023-10-21 RX ADMIN — APIXABAN 2.5 MG: 2.5 TABLET, FILM COATED ORAL at 09:08

## 2023-10-21 RX ADMIN — INSULIN LISPRO 3 UNITS: 100 INJECTION, SOLUTION INTRAVENOUS; SUBCUTANEOUS at 12:28

## 2023-10-21 RX ADMIN — IPRATROPIUM BROMIDE AND ALBUTEROL SULFATE 3 ML: 2.5; .5 SOLUTION RESPIRATORY (INHALATION) at 07:35

## 2023-10-21 RX ADMIN — KETOROLAC TROMETHAMINE 1 DROP: 0.5 SOLUTION OPHTHALMIC at 21:48

## 2023-10-21 ASSESSMENT — COGNITIVE AND FUNCTIONAL STATUS - GENERAL
MOVING TO AND FROM BED TO CHAIR: A LITTLE
MOVING FROM LYING ON BACK TO SITTING ON SIDE OF FLAT BED WITH BEDRAILS: A LITTLE
DRESSING REGULAR UPPER BODY CLOTHING: A LOT
DRESSING REGULAR LOWER BODY CLOTHING: A LOT
STANDING UP FROM CHAIR USING ARMS: A LITTLE
STANDING UP FROM CHAIR USING ARMS: A LOT
TURNING FROM BACK TO SIDE WHILE IN FLAT BAD: A LITTLE
DAILY ACTIVITIY SCORE: 16
MOBILITY SCORE: 18
TURNING FROM BACK TO SIDE WHILE IN FLAT BAD: A LITTLE
DRESSING REGULAR UPPER BODY CLOTHING: A LOT
TOILETING: A LOT
TOILETING: A LOT
MOVING TO AND FROM BED TO CHAIR: A LOT
MOVING FROM LYING ON BACK TO SITTING ON SIDE OF FLAT BED WITH BEDRAILS: A LITTLE
MOBILITY SCORE: 16
HELP NEEDED FOR BATHING: A LOT
WALKING IN HOSPITAL ROOM: A LITTLE
CLIMB 3 TO 5 STEPS WITH RAILING: A LITTLE
HELP NEEDED FOR BATHING: A LOT
DRESSING REGULAR LOWER BODY CLOTHING: A LOT
WALKING IN HOSPITAL ROOM: A LITTLE
CLIMB 3 TO 5 STEPS WITH RAILING: A LITTLE
DAILY ACTIVITIY SCORE: 16

## 2023-10-21 ASSESSMENT — PAIN - FUNCTIONAL ASSESSMENT
PAIN_FUNCTIONAL_ASSESSMENT: 0-10

## 2023-10-21 ASSESSMENT — PAIN SCALES - GENERAL
PAINLEVEL_OUTOF10: 0 - NO PAIN
PAINLEVEL_OUTOF10: 5 - MODERATE PAIN
PAINLEVEL_OUTOF10: 0 - NO PAIN

## 2023-10-21 ASSESSMENT — PAIN DESCRIPTION - DESCRIPTORS: DESCRIPTORS: ACHING;SORE

## 2023-10-21 ASSESSMENT — PAIN SCALES - WONG BAKER: WONGBAKER_NUMERICALRESPONSE: NO HURT

## 2023-10-21 NOTE — CARE PLAN
Problem: Fall/Injury  Goal: Be free from injury by end of the shift  Outcome: Progressing  Goal: Verbalize understanding of personal risk factors for fall in the hospital  Outcome: Progressing  Goal: Verbalize understanding of risk factor reduction measures to prevent injury from fall in the home  Outcome: Progressing  Goal: Use assistive devices by end of the shift  Outcome: Progressing  Goal: Pace activities to prevent fatigue by end of the shift  Outcome: Progressing     Problem: Pain  Goal: My pain/discomfort is manageable  Outcome: Progressing     Problem: Safety  Goal: Patient will be injury free during hospitalization  Outcome: Progressing  Goal: I will remain free of falls  Outcome: Progressing     Problem: Daily Care  Goal: Daily care needs are met  Outcome: Progressing     Problem: Psychosocial Needs  Goal: Demonstrates ability to cope with hospitalization/illness  Outcome: Progressing  Goal: Collaborate with me, my family, and caregiver to identify my specific goals  Outcome: Progressing     Problem: Discharge Barriers  Goal: My discharge needs are met  Outcome: Progressing     Problem: Skin  Goal: Decreased wound size/increased tissue granulation at next dressing change  Outcome: Progressing  Goal: Participates in plan/prevention/treatment measures  Outcome: Progressing  Goal: Prevent/manage excess moisture  Outcome: Progressing  Goal: Prevent/minimize sheer/friction injuries  Outcome: Progressing  Goal: Promote/optimize nutrition  Outcome: Progressing  Goal: Promote skin healing  Outcome: Progressing     Problem: Respiratory  Goal: Clear secretions with interventions this shift  Outcome: Progressing  Goal: Minimize anxiety/maximize coping throughout shift  Outcome: Progressing  Goal: Minimal/no exertional discomfort or dyspnea this shift  Outcome: Progressing  Goal: Verbalize decreased shortness of breath this shift  Outcome: Progressing  Goal: Wean oxygen to maintain O2 saturation per order/standard  this shift  Outcome: Progressing  Goal: Increase self care and/or family involvement in next 24 hours  Outcome: Progressing  Goal: No signs of respiratory distress (eg. Use of accessory muscles. Peds grunting)  Outcome: Progressing     Problem: Bathing  Goal: STG - Patient will bathe body UB/LB independent with AE as needed  Outcome: Progressing     Problem: Dressings Lower Extremities  Goal: LTG - Patient will utilize adaptive techniques/equipment to dress lower body independently  Outcome: Progressing     Problem: Toileting  Goal: LTG - Patient will demonstrate use of appropriate intervention for safe toileting independent with 2ww  Outcome: Progressing   The patient's goals for the shift include pain management    The clinical goals for the shift include control BP    Over the shift, the patient did not make progress toward the following goals. Barriers to progression include continuous pain in foot from infection/procedure. Recommendations to address these barriers include relaxation techniques and pain medication.

## 2023-10-21 NOTE — PROGRESS NOTES
Samira López is a 82 y.o. female on day 5 of admission presenting with Acute respiratory failure (CMS/HCC).    Subjective   Patient bedside this evening.  Resting comfortably.  Complaints of being chilly as the heater in her room is broken.  Awaiting transfer to new room.  Endorses pain in her left foot although controlled.  Denies shortness of breath cough fevers chills or sweats.       Physical Exam    Objective     Objective: Patient seen bedside.  Patient is alert aware and oriented x3.  She does not appear to be in acute distress.     Vasc: DP and PT pulses are faintly palpable. Skin is atrophic to the dorsum left foot. Hair growth absent.      Neuro:  sensation intact to left foot to light touch. Muscle strength +5/5.      Derm: Left foot status post transmetatarsal amputation.  The incision with Prolene suture intact.  This is well coapted.  There is no signs of ischemia or incisional dehiscence.  She does have several superficial venous stasis ulcerations to the dorsum foot, lateral foot, and anterior ankle these all appear granular without significant depth.  There is findings consistent with chronic venous stasis dermatitis.   There is a posterior heel decubitus ulcer measuring roughly 1.7 x 2.5.  This appears more boggy today.  This appears to be demarcating.  There is a clear deep tissue injury noted here.  This appears worse today.  There is a mixed fibrogranular base.  No active purulent drainage.  Overall there are no significant signs of bacterial infection involving the left lower extremity.  Erythema is present secondary to dependent rubor and venous stasis dermatitis.  Low concern for ascending cellulitis or infectious process.     Ortho: Significant tenderness on palpation to the multiple ulcerations to the left lower extremity.  Status post transmetatarsal amputation.      Last Recorded Vitals  Blood pressure 164/61, pulse 52, temperature 36.6 °C (97.9 °F), temperature source Temporal, resp.  "rate 20, height 1.791 m (5' 10.5\"), weight 85.7 kg (188 lb 15 oz), SpO2 98 %.    Intake/Output last 3 Shifts:  I/O last 3 completed shifts:  In: 1200 (14 mL/kg) [P.O.:1200]  Out: - (0 mL/kg)   Weight: 85.7 kg     Relevant Results           Assessment/Plan   Principal Problem:    Acute respiratory failure (CMS/HCC)  Active Problems:    Arthritis, multiple joint involvement    Balance disorder    Lumbar canal stenosis    Chronic diastolic congestive heart failure (CMS/HCC)    Dermatitis    Obstructive sleep apnea, adult    Peripheral polyneuropathy    Anemia due to chronic kidney disease    Cellulitis and abscess of lower extremity    Cellulitis of left lower limb    Cutaneous abscess of left lower limb    Malignant neoplasm of unspecified site of unspecified female breast (CMS/HCC)    NATALIYA (obstructive sleep apnea)    Personal history of nicotine dependence    Primary generalized (osteo)arthritis    Abnormal computed tomography scan    Accidental fall    Anxiety    Asthenia    Chronic respiratory failure with hypoxia (CMS/HCC)    Diabetes mellitus type 2 in obese (CMS/HCC)    Gangrene of foot (CMS/HCC)    Dyspnea    I am managing patient's left lower extremity leg and foot ulcerations as well as transmetatarsal amputation site.  She is roughly 1 month out status post transmet amputation.  On examination today the incision remains well-healed.    Previous swab culture obtained from the left heel ulceration Gram stain growing Pseudomonas and MRSA.           She has a large anterior ankle unstageable pressure injury as well as a venous ulcer to the dorsum of the left foot.  Worsening heel decubitus ulceration.  This was dressed today with calcium alginate to the base of the wound and heel protector dressing.  The anterior ankle and dorsum left foot were dressed with calcium alginate and a well-padded with ABD pads and lightly wrapped Kerlix.  Will hold off on compression today secondary to worsening evidence of pressure " injuries. PRAFO boots at all times while in bed to bilateral lower extremities.  Okay for full weightbearing utilizing postop shoe to the left foot.  We will continue to follow peripherally.  Daily wound care orders placed for nursing to change dressing over the weekend.          Anders Loera DPM

## 2023-10-21 NOTE — PROGRESS NOTES
Samira López is a 82 y.o. female on day 6 of admission presenting with Acute respiratory failure (CMS/HCC).    Subjective   Patient seen and examined.  Lying in the bed.  Patient complaining of pain in the foot.  Denies any headache or dizziness.  No fever chills or rigor.       Objective     Physical Exam  HEENT:  Head externally atraumatic, no pallor, no icterus, extraocular movements intact, pulls reacting to light, oral mucosa moist and throat clear.  Neck:  Supple, no JVP, no palpable adenopathy or thyromegaly.  No carotid bruit.  Chest:  Clear to auscultation and resonant.  Heart:  Regular rate and rhythm, no murmur or gallop could be appreciated.  Abdomen:  Soft, nontender, bowel sounds present, normoactive, no palpable hepatosplenomegaly.  Extremities: Left transmetatarsal amputation.  Pulses weak.  CNS:  Patient alert, oriented to time, place and person.  Power 5/5 all over and deep tendon reflexes symmetrical, cranial nerves 2-12 grossly intact.  Skin: Healed decubitus ulcers.    Musculoskeletal:  No joint swelling or erythema, range of movement normal.  Last Recorded Vitals  Heart Rate:  [44-58]   Temp:  [36.3 °C (97.3 °F)-36.6 °C (97.9 °F)]   Resp:  [14-20]   BP: (164-193)/(60-93)   Weight:  [89.2 kg (196 lb 9.4 oz)]   SpO2:  [94 %-98 %]     Intake/Output last 3 Shifts:  I/O last 3 completed shifts:  In: 360 (4 mL/kg) [P.O.:360]  Out: - (0 mL/kg)   Weight: 89.2 kg     Relevant Results  Susceptibility data from last 90 days.  Collected Specimen Info Organism Aztreonam Cefepime Ceftazidime Ciprofloxacin Clindamycin Erythromycin Gentamicin Levofloxacin Oxacillin Piperacillin/Tazobactam Tetracycline Tobramycin Trimethoprim/Sulfamethoxazole Vancomycin   10/16/23 Tissue/Biopsy from Diabetic Foot Ulcer Methicillin Resistant Staphylococcus aureus (MRSA)     S R   R  R  R S     Pseudomonas aeruginosa S S S S   S S  S  S     10/15/23 Tissue/Biopsy from Wound/Tissue Pseudomonas aeruginosa S S S S   S S  S  S        Mixed Gram-Positive and Gram-Negative Bacteria                   Results for orders placed or performed during the hospital encounter of 10/15/23 (from the past 24 hour(s))   POCT GLUCOSE   Result Value Ref Range    POCT Glucose 172 (H) 74 - 99 mg/dL   Basic Metabolic Panel   Result Value Ref Range    Glucose 192 (H) 65 - 99 mg/dL    Sodium 139 133 - 145 mmol/L    Potassium 3.5 3.4 - 5.1 mmol/L    Chloride 111 (H) 97 - 107 mmol/L    Bicarbonate 20 (L) 24 - 31 mmol/L    Urea Nitrogen 18 8 - 25 mg/dL    Creatinine 0.60 0.40 - 1.60 mg/dL    eGFR 90 >60 mL/min/1.73m*2    Calcium 7.4 (L) 8.5 - 10.4 mg/dL    Anion Gap 8 <=19 mmol/L   CBC   Result Value Ref Range    WBC 8.8 4.4 - 11.3 x10*3/uL    nRBC 0.2 (H) 0.0 - 0.0 /100 WBCs    RBC 3.02 (L) 4.00 - 5.20 x10*6/uL    Hemoglobin 8.9 (L) 12.0 - 16.0 g/dL    Hematocrit 28.8 (L) 36.0 - 46.0 %    MCV 95 80 - 100 fL    MCH 29.5 26.0 - 34.0 pg    MCHC 30.9 (L) 32.0 - 36.0 g/dL    RDW 16.8 (H) 11.5 - 14.5 %    Platelets 214 150 - 450 x10*3/uL    MPV 10.7 7.5 - 11.5 fL   POCT GLUCOSE   Result Value Ref Range    POCT Glucose 167 (H) 74 - 99 mg/dL   POCT GLUCOSE   Result Value Ref Range    POCT Glucose 183 (H) 74 - 99 mg/dL        Current Facility-Administered Medications:     acetaminophen (Tylenol) tablet 650 mg, 650 mg, oral, q4h PRN, 650 mg at 10/20/23 1030 **OR** acetaminophen (Tylenol) oral liquid 650 mg, 650 mg, oral, q4h PRN, 650 mg at 10/15/23 2333 **OR** acetaminophen (Tylenol) suppository 650 mg, 650 mg, rectal, q4h PRN, Jaquan Ríos MD    acetaminophen (Tylenol) tablet 325 mg, 325 mg, oral, q6h PRN, Jaquan Ríos MD    alteplase (Cathflo Activase) injection 2 mg, 2 mg, intra-catheter, PRN, Carlos Malone MD    apixaban (Eliquis) tablet 2.5 mg, 2.5 mg, oral, BID, Jaquan Ríos MD, 2.5 mg at 10/21/23 0908    ascorbic acid (Vitamin C) tablet 500 mg, 500 mg, oral, Daily with breakfast, Jaquan Ríos MD, 500 mg at 10/21/23 0908    aspirin  chewable tablet 81 mg, 81 mg, oral, Daily, Jaquan Ríos MD, 81 mg at 10/21/23 0908    benzocaine-menthol (Cepastat Sore Throat) 15-3.6 mg lozenge 1 lozenge, 1 lozenge, Mouth/Throat, q2h PRN, Jaquan Ríos MD    cholecalciferol (Vitamin D-3) tablet 2,000 Units, 2,000 Units, oral, Daily, Jaquan Ríos MD, 2,000 Units at 10/21/23 0908    dexAMETHasone (Decadron) tablet 6 mg, 6 mg, oral, BID after meals, Jaquan Ríos MD, 6 mg at 10/21/23 0908    dextromethorphan-guaifenesin (Robitussin DM)  mg/5 mL oral liquid 5 mL, 5 mL, oral, q4h PRN, Jaquan Ríos MD    dextrose 10 % in water (D10W) infusion, 0.3 g/kg/hr, intravenous, Once PRN, Jaquan Ríos MD    dextrose 50 % injection 25 g, 25 g, intravenous, q15 min PRN, Jaquan Ríos MD    diphenhydrAMINE (Sominex) tablet 25 mg, 25 mg, oral, q6h PRN, Jaquan Ríos MD    dorzolamide (Trusopt) 2 % ophthalmic solution 1 drop, 1 drop, Both Eyes, BID, Jaquan Ríos MD, 1 drop at 10/21/23 0909    exemestane (Aromasin) tablet 25 mg, 25 mg, oral, Daily, Jaquan Ríos MD, 25 mg at 10/21/23 0924    ferrous gluconate (Fergon) 324 (38 Fe) mg tablet 324 mg, 324 mg, oral, BID after meals, Jaquan Ríos MD, 324 mg at 10/21/23 0908    glucagon (Glucagen) injection 1 mg, 1 mg, intramuscular, q15 min PRN, Jaquan Ríos MD    guaiFENesin (Mucinex) 12 hr tablet 600 mg, 600 mg, oral, q12h PRN, Jaquan Ríos MD    insulin lispro (HumaLOG) injection 0-15 Units, 0-15 Units, subcutaneous, TID with meals, Jaquan Ríos MD, 3 Units at 10/21/23 1228    ipratropium-albuteroL (Duo-Neb) 0.5-2.5 mg/3 mL nebulizer solution 3 mL, 3 mL, nebulization, TID, Jaquan Ríos MD, 3 mL at 10/21/23 1238    ketorolac (Acular) 0.5 % ophthalmic solution 1 drop, 1 drop, Both Eyes, 4x daily, Jaquan Ríos MD, 1 drop at 10/21/23 1202    latanoprost (Xalatan) 0.005 % ophthalmic solution 1 drop, 1 drop, Both Eyes, Nightly,  Jaquan Ríos MD, 1 drop at 10/20/23 2105    lidocaine (Xylocaine) 10 mg/mL (1 %) injection 10 mg, 1 mL, infiltration, Once, Carlos Malone MD    oxybutynin (Ditropan) tablet 5 mg, 5 mg, oral, BID, Jaquan Ríos MD, 5 mg at 10/21/23 0908    oxyCODONE (Roxicodone) immediate release tablet 5 mg, 5 mg, oral, q4h PRN, Jaquan Ríos MD, 5 mg at 10/21/23 0129    oxygen (O2) therapy, , inhalation, Continuous, Jaquan Ríos MD, Last Rate: 180,000 mL/hr at 10/17/23 1115, 3 L/min at 10/17/23 1115    pantoprazole (ProtoNix) EC tablet 40 mg, 40 mg, oral, Daily before breakfast, Jaquan Ríos MD, 40 mg at 10/21/23 0619    piperacillin-tazobactam-dextrose (Zosyn) IV 4.5 g, 4.5 g, intravenous, q8h, Jaquan Ríos MD, Stopped at 10/21/23 0938    polyethylene glycol (Glycolax, Miralax) packet 17 g, 17 g, oral, Daily PRN, Jaquan Ríos MD    polyethylene glycol (Glycolax, Miralax) packet 17 g, 17 g, oral, Daily, MALIK Dobbs-CNP, 17 g at 10/21/23 0908    rosuvastatin (Crestor) tablet 20 mg, 20 mg, oral, Daily, Jaquan Ríos MD, 20 mg at 10/21/23 0909    tiZANidine (Zanaflex) tablet 2 mg, 2 mg, oral, q6h PRN, DOMI DobbsCNP, 2 mg at 10/21/23 0129    [START ON 10/22/2023] vancomycin-diluent combo no.1 (Xellia) IVPB 1,500 mg, 1,500 mg, intravenous, q24h, Jaquan Ríos MD   Assessment/Plan   Principal Problem:    Acute respiratory failure (CMS/HCC)  Active Problems:    Arthritis, multiple joint involvement    Balance disorder    Lumbar canal stenosis    Chronic diastolic congestive heart failure (CMS/HCC)    Dermatitis    Obstructive sleep apnea, adult    Peripheral polyneuropathy    Anemia due to chronic kidney disease    Cellulitis and abscess of lower extremity    Cellulitis of left lower limb    Cutaneous abscess of left lower limb    Malignant neoplasm of unspecified site of unspecified female breast (CMS/HCC)    NATALIYA (obstructive sleep apnea)     Personal history of nicotine dependence    Primary generalized (osteo)arthritis    Abnormal computed tomography scan    Accidental fall    Anxiety    Asthenia    Chronic respiratory failure with hypoxia (CMS/HCC)    Diabetes mellitus type 2 in obese (CMS/HCC)    Gangrene of foot (CMS/HCC)    Dyspnea    Continue current management continue antibiotics.  Supportive care.  Physical therapy and Occupational Therapy.  Monitor pulse ox.  Patient doing fine.  COVID symptoms are fairly under control.  Control pain.  Get supportive care.  Give physical therapy and Occupational Therapy.  Advised to continue abstaining from smoking.  We will take DVT, fall, aspiration decubitus precaution.  This has been discussed with the patient and she is agreeable to it.         Jaquan Ríos MD

## 2023-10-21 NOTE — CARE PLAN
Problem: Respiratory  Goal: Minimize anxiety/maximize coping throughout shift  Outcome: Progressing  Goal: Wean oxygen to maintain O2 saturation per order/standard this shift  Outcome: Progressing  Goal: No signs of respiratory distress (eg. Use of accessory muscles. Peds grunting)  Outcome: Progressing

## 2023-10-21 NOTE — PROGRESS NOTES
"Vancomycin Dosing by Pharmacy- FOLLOW UP    Samira López is a 82 y.o. year old female who Pharmacy has been consulted for vancomycin dosing for cellulitis, skin and soft tissue. Based on the patient's indication and renal status this patient is being dosed based on a goal AUC of 400-600.     Renal function is currently stable.    Current vancomycin dose: 1250 mg given every 24 hours    Estimated vancomycin AUC on current dose: 400 mg/L.hr     Visit Vitals  BP (!) 193/93 (BP Location: Left arm, Patient Position: Lying)   Pulse 54   Temp 36.3 °C (97.3 °F) (Temporal)   Resp 14        Lab Results   Component Value Date    CREATININE 0.60 10/21/2023    CREATININE 0.60 10/20/2023    CREATININE 0.70 10/19/2023    CREATININE 0.70 10/18/2023        Patient weight is   Wt Readings from Last 1 Encounters:   10/21/23 89.2 kg (196 lb 9.4 oz)         No results found for: \"CULTURE\"     I/O last 3 completed shifts:  In: 360 (4 mL/kg) [P.O.:360]  Out: - (0 mL/kg)   Weight: 89.2 kg     Lab Results   Component Value Date    PATIENTTEMP 37.0 05/13/2023        Assessment/Plan    At low-end of goal AUC range with 50% probability of achieving AUC > 400. Orders placed for new vancomcyin regimen of 1500 every 24 hours to begin 10/22 @ 0600.     This dosing regimen is predicted by [employer] to result in the following pharmacokinetic parameters:  Loading dose: N/A  Regimen: 1500 mg IV every 24 hours.  Start time: 06:19 on 10/22/2023  Exposure target: AUC24 (range)400-600 mg/L.hr   AUC24,ss: 475 mg/L.hr  Probability of AUC24 > 400: 91 %  Ctrough,ss: 12.9 mg/L  Probability of Ctrough,ss > 20: 1 %  Probability of nephrotoxicity (Lodise REUBEN 2009): 8 %    The next level will be obtained on 10/23 with AM labs. May be obtained sooner if clinically indicated.   Will continue to monitor renal function daily while on vancomycin and order serum creatinine at least every 48 hours if not already ordered.  Follow for continued vancomycin needs, " clinical response, and signs/symptoms of toxicity.       Jeanmarie Espinosa, [facility]

## 2023-10-21 NOTE — NURSING NOTE
Assumed care of pt. Pt sitting upright in bed. ATB infusing. NAD. No signs of pain per the FLACC scale. Bed low and locked, bed alarm on for safety. Covid isolation in place. 2LNC. Alert X4. BSSR completed

## 2023-10-21 NOTE — CARE PLAN
Problem: Respiratory  Goal: Minimize anxiety/maximize coping throughout shift  Outcome: Progressing  Goal: Verbalize decreased shortness of breath this shift  Outcome: Progressing  Goal: No signs of respiratory distress (eg. Use of accessory muscles. Peds grunting)  Outcome: Progressing

## 2023-10-21 NOTE — NURSING NOTE
Assumed care of patient from dayshift nurse. Patient resting comfortably in bed at this time with no complaints of pain and or discomfort. Podiatry to change dressing to LLE soon. Patient's room very cold; told the patient we would move her when one of the neighboring rooms is open. Patient agreeable to plan to move rooms. Female purewick external catheter in place and functioning properly; suctioning urine into wall suction cannister. Nasal cannula in place, set at 2L of oxygen. Maintenance fluids infusing through patient's double lumen PICC; other lumen flushes and draws back blood. Bed in lowest position and locked, bed alarm on and functioning, and call bell and personal belongings within reach. No further interventions at this time.

## 2023-10-22 LAB
GLUCOSE BLD MANUAL STRIP-MCNC: 141 MG/DL (ref 74–99)
GLUCOSE BLD MANUAL STRIP-MCNC: 203 MG/DL (ref 74–99)
GLUCOSE BLD MANUAL STRIP-MCNC: 207 MG/DL (ref 74–99)
GLUCOSE BLD MANUAL STRIP-MCNC: 242 MG/DL (ref 74–99)

## 2023-10-22 PROCEDURE — 2500000004 HC RX 250 GENERAL PHARMACY W/ HCPCS (ALT 636 FOR OP/ED): Performed by: NURSE PRACTITIONER

## 2023-10-22 PROCEDURE — 2500000004 HC RX 250 GENERAL PHARMACY W/ HCPCS (ALT 636 FOR OP/ED): Performed by: INTERNAL MEDICINE

## 2023-10-22 PROCEDURE — 94640 AIRWAY INHALATION TREATMENT: CPT

## 2023-10-22 PROCEDURE — 82947 ASSAY GLUCOSE BLOOD QUANT: CPT

## 2023-10-22 PROCEDURE — 2500000001 HC RX 250 WO HCPCS SELF ADMINISTERED DRUGS (ALT 637 FOR MEDICARE OP): Performed by: INTERNAL MEDICINE

## 2023-10-22 PROCEDURE — 1200000002 HC GENERAL ROOM WITH TELEMETRY DAILY

## 2023-10-22 PROCEDURE — 2500000002 HC RX 250 W HCPCS SELF ADMINISTERED DRUGS (ALT 637 FOR MEDICARE OP, ALT 636 FOR OP/ED): Performed by: INTERNAL MEDICINE

## 2023-10-22 PROCEDURE — 9420000001 HC RT PATIENT EDUCATION 5 MIN

## 2023-10-22 RX ADMIN — OXYBUTYNIN CHLORIDE 5 MG: 5 TABLET ORAL at 08:24

## 2023-10-22 RX ADMIN — KETOROLAC TROMETHAMINE 1 DROP: 0.5 SOLUTION OPHTHALMIC at 12:16

## 2023-10-22 RX ADMIN — EXEMESTANE 25 MG: 25 TABLET ORAL at 08:23

## 2023-10-22 RX ADMIN — OXYCODONE HYDROCHLORIDE 5 MG: 5 TABLET ORAL at 21:17

## 2023-10-22 RX ADMIN — DORZOLAMIDE HYDROCHLORIDE 1 DROP: 20 SOLUTION/ DROPS OPHTHALMIC at 08:23

## 2023-10-22 RX ADMIN — KETOROLAC TROMETHAMINE 1 DROP: 0.5 SOLUTION OPHTHALMIC at 16:38

## 2023-10-22 RX ADMIN — KETOROLAC TROMETHAMINE 1 DROP: 0.5 SOLUTION OPHTHALMIC at 21:18

## 2023-10-22 RX ADMIN — PANTOPRAZOLE SODIUM 40 MG: 40 TABLET, DELAYED RELEASE ORAL at 05:54

## 2023-10-22 RX ADMIN — DEXAMETHASONE 6 MG: 6 TABLET ORAL at 17:05

## 2023-10-22 RX ADMIN — POLYETHYLENE GLYCOL 3350 17 G: 17 POWDER, FOR SOLUTION ORAL at 08:23

## 2023-10-22 RX ADMIN — ROSUVASTATIN CALCIUM 20 MG: 20 TABLET, COATED ORAL at 08:24

## 2023-10-22 RX ADMIN — KETOROLAC TROMETHAMINE 1 DROP: 0.5 SOLUTION OPHTHALMIC at 06:04

## 2023-10-22 RX ADMIN — VANCOMYCIN 1500 MG: 1.5 INJECTION, SOLUTION INTRAVENOUS at 05:53

## 2023-10-22 RX ADMIN — Medication 324 MG: at 16:35

## 2023-10-22 RX ADMIN — Medication 2000 UNITS: at 08:24

## 2023-10-22 RX ADMIN — INSULIN LISPRO 6 UNITS: 100 INJECTION, SOLUTION INTRAVENOUS; SUBCUTANEOUS at 16:35

## 2023-10-22 RX ADMIN — APIXABAN 2.5 MG: 2.5 TABLET, FILM COATED ORAL at 21:17

## 2023-10-22 RX ADMIN — OXYCODONE HYDROCHLORIDE 5 MG: 5 TABLET ORAL at 09:13

## 2023-10-22 RX ADMIN — LATANOPROST 1 DROP: 50 SOLUTION OPHTHALMIC at 21:17

## 2023-10-22 RX ADMIN — Medication 324 MG: at 08:24

## 2023-10-22 RX ADMIN — DORZOLAMIDE HYDROCHLORIDE 1 DROP: 20 SOLUTION/ DROPS OPHTHALMIC at 21:18

## 2023-10-22 RX ADMIN — ASPIRIN 81 MG CHEWABLE TABLET 81 MG: 81 TABLET CHEWABLE at 08:24

## 2023-10-22 RX ADMIN — PIPERACILLIN SODIUM AND TAZOBACTAM SODIUM 4.5 G: 4; .5 INJECTION, SOLUTION INTRAVENOUS at 17:05

## 2023-10-22 RX ADMIN — DEXAMETHASONE 6 MG: 6 TABLET ORAL at 08:24

## 2023-10-22 RX ADMIN — PIPERACILLIN SODIUM AND TAZOBACTAM SODIUM 4.5 G: 4; .5 INJECTION, SOLUTION INTRAVENOUS at 01:42

## 2023-10-22 RX ADMIN — APIXABAN 2.5 MG: 2.5 TABLET, FILM COATED ORAL at 08:24

## 2023-10-22 RX ADMIN — INSULIN LISPRO 6 UNITS: 100 INJECTION, SOLUTION INTRAVENOUS; SUBCUTANEOUS at 12:15

## 2023-10-22 RX ADMIN — OXYCODONE HYDROCHLORIDE AND ACETAMINOPHEN 500 MG: 500 TABLET ORAL at 08:24

## 2023-10-22 RX ADMIN — PIPERACILLIN SODIUM AND TAZOBACTAM SODIUM 4.5 G: 4; .5 INJECTION, SOLUTION INTRAVENOUS at 11:22

## 2023-10-22 RX ADMIN — IPRATROPIUM BROMIDE AND ALBUTEROL SULFATE 3 ML: 2.5; .5 SOLUTION RESPIRATORY (INHALATION) at 19:40

## 2023-10-22 RX ADMIN — OXYBUTYNIN CHLORIDE 5 MG: 5 TABLET ORAL at 21:17

## 2023-10-22 SDOH — SOCIAL STABILITY: SOCIAL INSECURITY: ARE YOU OR HAVE YOU BEEN THREATENED OR ABUSED PHYSICALLY, EMOTIONALLY, OR SEXUALLY BY ANYONE?: NO

## 2023-10-22 SDOH — SOCIAL STABILITY: SOCIAL INSECURITY: HAVE YOU HAD THOUGHTS OF HARMING ANYONE ELSE?: NO

## 2023-10-22 SDOH — SOCIAL STABILITY: SOCIAL INSECURITY: WERE YOU ABLE TO COMPLETE ALL THE BEHAVIORAL HEALTH SCREENINGS?: YES

## 2023-10-22 SDOH — SOCIAL STABILITY: SOCIAL INSECURITY: DO YOU FEEL UNSAFE GOING BACK TO THE PLACE WHERE YOU ARE LIVING?: NO

## 2023-10-22 SDOH — SOCIAL STABILITY: SOCIAL INSECURITY: DOES ANYONE TRY TO KEEP YOU FROM HAVING/CONTACTING OTHER FRIENDS OR DOING THINGS OUTSIDE YOUR HOME?: NO

## 2023-10-22 SDOH — SOCIAL STABILITY: SOCIAL INSECURITY: HAS ANYONE EVER THREATENED TO HURT YOUR FAMILY OR YOUR PETS?: NO

## 2023-10-22 SDOH — SOCIAL STABILITY: SOCIAL INSECURITY: DO YOU FEEL ANYONE HAS EXPLOITED OR TAKEN ADVANTAGE OF YOU FINANCIALLY OR OF YOUR PERSONAL PROPERTY?: NO

## 2023-10-22 SDOH — SOCIAL STABILITY: SOCIAL INSECURITY: ABUSE: ADULT

## 2023-10-22 SDOH — SOCIAL STABILITY: SOCIAL INSECURITY: ARE THERE ANY APPARENT SIGNS OF INJURIES/BEHAVIORS THAT COULD BE RELATED TO ABUSE/NEGLECT?: NO

## 2023-10-22 ASSESSMENT — COGNITIVE AND FUNCTIONAL STATUS - GENERAL
PERSONAL GROOMING: A LITTLE
TOILETING: A LOT
MOVING FROM LYING ON BACK TO SITTING ON SIDE OF FLAT BED WITH BEDRAILS: A LITTLE
MOBILITY SCORE: 16
WALKING IN HOSPITAL ROOM: A LITTLE
MOVING TO AND FROM BED TO CHAIR: A LOT
STANDING UP FROM CHAIR USING ARMS: A LOT
STANDING UP FROM CHAIR USING ARMS: A LOT
CLIMB 3 TO 5 STEPS WITH RAILING: A LOT
DAILY ACTIVITIY SCORE: 18
PERSONAL GROOMING: A LITTLE
EATING MEALS: A LITTLE
CLIMB 3 TO 5 STEPS WITH RAILING: A LITTLE
DAILY ACTIVITIY SCORE: 14
TURNING FROM BACK TO SIDE WHILE IN FLAT BAD: A LITTLE
DRESSING REGULAR LOWER BODY CLOTHING: A LOT
MOVING FROM LYING ON BACK TO SITTING ON SIDE OF FLAT BED WITH BEDRAILS: A LITTLE
TURNING FROM BACK TO SIDE WHILE IN FLAT BAD: A LITTLE
DRESSING REGULAR UPPER BODY CLOTHING: A LITTLE
TOILETING: A LOT
MOBILITY SCORE: 15
HELP NEEDED FOR BATHING: A LITTLE
WALKING IN HOSPITAL ROOM: A LITTLE
MOVING TO AND FROM BED TO CHAIR: A LOT
DRESSING REGULAR LOWER BODY CLOTHING: A LITTLE
HELP NEEDED FOR BATHING: A LOT
DRESSING REGULAR UPPER BODY CLOTHING: A LOT

## 2023-10-22 ASSESSMENT — PAIN SCALES - GENERAL
PAINLEVEL_OUTOF10: 7
PAINLEVEL_OUTOF10: 0 - NO PAIN
PAINLEVEL_OUTOF10: 0 - NO PAIN
PAINLEVEL_OUTOF10: 3
PAINLEVEL_OUTOF10: 5 - MODERATE PAIN

## 2023-10-22 ASSESSMENT — LIFESTYLE VARIABLES
HOW MANY STANDARD DRINKS CONTAINING ALCOHOL DO YOU HAVE ON A TYPICAL DAY: PATIENT DOES NOT DRINK
SKIP TO QUESTIONS 9-10: 1
AUDIT-C TOTAL SCORE: 0
HOW OFTEN DO YOU HAVE A DRINK CONTAINING ALCOHOL: NEVER
AUDIT-C TOTAL SCORE: 0
HOW OFTEN DO YOU HAVE 6 OR MORE DRINKS ON ONE OCCASION: NEVER

## 2023-10-22 ASSESSMENT — PATIENT HEALTH QUESTIONNAIRE - PHQ9
1. LITTLE INTEREST OR PLEASURE IN DOING THINGS: NOT AT ALL
SUM OF ALL RESPONSES TO PHQ9 QUESTIONS 1 & 2: 0
2. FEELING DOWN, DEPRESSED OR HOPELESS: NOT AT ALL

## 2023-10-22 ASSESSMENT — PAIN - FUNCTIONAL ASSESSMENT
PAIN_FUNCTIONAL_ASSESSMENT: 0-10
PAIN_FUNCTIONAL_ASSESSMENT: 0-10

## 2023-10-22 ASSESSMENT — ACTIVITIES OF DAILY LIVING (ADL): ASSISTIVE_DEVICE: EYEGLASSES

## 2023-10-22 NOTE — CARE PLAN
The patient's goals for the shift include pain management    The clinical goals for the shift include control BP    Over the shift, the patient did not make progress toward the following goals. Barriers to progression include . Recommendations to address these barriers include .

## 2023-10-22 NOTE — PROGRESS NOTES
Samira López is a 83 y.o. female on day 7 of admission presenting with Acute respiratory failure (CMS/HCC).    Subjective   The patient was seen and examined.  Lying in the bed.  Comfortable.  Did not look in acute distress.  Patient is thinking that she is using too much oxygen.  Patient is on her baseline of 3 L oxygen.       Objective     Physical Exam  HEENT:  Head externally atraumatic, no pallor, no icterus, extraocular movements intact, pupils reacting to light, oral mucosa moist and throat clear.  Neck:  Supple, no JVP, no palpable adenopathy or thyromegaly.  No carotid bruit.  Chest:  Clear to auscultation and resonant.  Heart:  Regular rate and rhythm, no murmur or gallop could be appreciated.  Abdomen:  Soft, nontender, bowel sounds present, normoactive, no palpable hepatosplenomegaly.  Extremities:  No edema, pulses present, no cyanosis or clubbing.  CNS:  Patient alert, oriented to time, place and person.  Power 5/5 all over and deep tendon reflexes symmetrical, cranial nerves 2-12 grossly intact.  Skin:  No active rash.  Musculoskeletal:  No joint swelling or erythema, range of movement normal.  Last Recorded Vitals  Heart Rate:  [51-74]   Temp:  [36.2 °C (97.2 °F)-36.9 °C (98.4 °F)]   Resp:  [20-24]   BP: (158-209)/(62-92)   SpO2:  [95 %-98 %]     Intake/Output last 3 Shifts:  I/O last 3 completed shifts:  In: 500 (5.6 mL/kg) [P.O.:400; IV Piggyback:100]  Out: - (0 mL/kg)   Weight: 89.2 kg     Relevant Results  Susceptibility data from last 90 days.  Collected Specimen Info Organism Aztreonam Cefepime Ceftazidime Ciprofloxacin Clindamycin Erythromycin Gentamicin Levofloxacin Oxacillin Piperacillin/Tazobactam Tetracycline Tobramycin Trimethoprim/Sulfamethoxazole Vancomycin   10/16/23 Tissue/Biopsy from Diabetic Foot Ulcer Methicillin Resistant Staphylococcus aureus (MRSA)     S R   R  R  R S     Pseudomonas aeruginosa S S S S   S S  S  S     10/15/23 Tissue/Biopsy from Wound/Tissue Pseudomonas  aeruginosa S S S S   S S  S  S       Mixed Gram-Positive and Gram-Negative Bacteria                   Results for orders placed or performed during the hospital encounter of 10/15/23 (from the past 24 hour(s))   POCT GLUCOSE   Result Value Ref Range    POCT Glucose 183 (H) 74 - 99 mg/dL   POCT GLUCOSE   Result Value Ref Range    POCT Glucose 201 (H) 74 - 99 mg/dL   POCT GLUCOSE   Result Value Ref Range    POCT Glucose 242 (H) 74 - 99 mg/dL        Current Facility-Administered Medications:     acetaminophen (Tylenol) tablet 650 mg, 650 mg, oral, q4h PRN, 650 mg at 10/20/23 1030 **OR** acetaminophen (Tylenol) oral liquid 650 mg, 650 mg, oral, q4h PRN, 650 mg at 10/15/23 2333 **OR** acetaminophen (Tylenol) suppository 650 mg, 650 mg, rectal, q4h PRN, Jaquan Ríos MD    acetaminophen (Tylenol) tablet 325 mg, 325 mg, oral, q6h PRN, Jaquan Ríos MD    alteplase (Cathflo Activase) injection 2 mg, 2 mg, intra-catheter, PRN, Carlos Malone MD    apixaban (Eliquis) tablet 2.5 mg, 2.5 mg, oral, BID, Jaquan Ríos MD, 2.5 mg at 10/22/23 0824    ascorbic acid (Vitamin C) tablet 500 mg, 500 mg, oral, Daily with breakfast, Jaquan Ríos MD, 500 mg at 10/22/23 0824    aspirin chewable tablet 81 mg, 81 mg, oral, Daily, Jaquan Ríos MD, 81 mg at 10/22/23 0824    benzocaine-menthol (Cepastat Sore Throat) 15-3.6 mg lozenge 1 lozenge, 1 lozenge, Mouth/Throat, q2h PRN, Jaquan Ríos MD    cholecalciferol (Vitamin D-3) tablet 2,000 Units, 2,000 Units, oral, Daily, Jaquan Ríos MD, 2,000 Units at 10/22/23 0824    dexAMETHasone (Decadron) tablet 6 mg, 6 mg, oral, BID after meals, Jaquan Ríos MD, 6 mg at 10/22/23 0824    dextromethorphan-guaifenesin (Robitussin DM)  mg/5 mL oral liquid 5 mL, 5 mL, oral, q4h PRN, Jaquan Ríos MD    dextrose 10 % in water (D10W) infusion, 0.3 g/kg/hr, intravenous, Once PRN, Jaquan Ríos MD    dextrose 50 % injection 25 g, 25 g,  intravenous, q15 min PRN, Jaquan Ríos MD    diphenhydrAMINE (Sominex) tablet 25 mg, 25 mg, oral, q6h PRN, Jaquan Ríos MD    dorzolamide (Trusopt) 2 % ophthalmic solution 1 drop, 1 drop, Both Eyes, BID, Jaquan Ríos MD, 1 drop at 10/22/23 0823    exemestane (Aromasin) tablet 25 mg, 25 mg, oral, Daily, Jaquan Ríos MD, 25 mg at 10/22/23 0823    ferrous gluconate (Fergon) 324 (38 Fe) mg tablet 324 mg, 324 mg, oral, BID after meals, Jaquan Ríos MD, 324 mg at 10/22/23 0824    glucagon (Glucagen) injection 1 mg, 1 mg, intramuscular, q15 min PRN, Jaquan Ríos MD    guaiFENesin (Mucinex) 12 hr tablet 600 mg, 600 mg, oral, q12h PRN, Jaquan Ríos MD    insulin lispro (HumaLOG) injection 0-15 Units, 0-15 Units, subcutaneous, TID with meals, Jaquan Ríos MD, 6 Units at 10/22/23 1215    ipratropium-albuteroL (Duo-Neb) 0.5-2.5 mg/3 mL nebulizer solution 3 mL, 3 mL, nebulization, TID, Jaquan Ríos MD, 3 mL at 10/21/23 1928    ketorolac (Acular) 0.5 % ophthalmic solution 1 drop, 1 drop, Both Eyes, 4x daily, Jaquan Ríos MD, 1 drop at 10/22/23 1216    latanoprost (Xalatan) 0.005 % ophthalmic solution 1 drop, 1 drop, Both Eyes, Nightly, Jaquan Ríos MD, 1 drop at 10/21/23 2148    lidocaine (Xylocaine) 10 mg/mL (1 %) injection 10 mg, 1 mL, infiltration, Once, Carlos Malone MD    oxybutynin (Ditropan) tablet 5 mg, 5 mg, oral, BID, Jaquan Ríos MD, 5 mg at 10/22/23 0824    oxyCODONE (Roxicodone) immediate release tablet 5 mg, 5 mg, oral, q4h PRN, Jaquan Ríos MD, 5 mg at 10/22/23 0913    oxygen (O2) therapy, , inhalation, Continuous, Jaquan Ríos MD, Last Rate: 180,000 mL/hr at 10/17/23 1115, 3 L/min at 10/17/23 1115    pantoprazole (ProtoNix) EC tablet 40 mg, 40 mg, oral, Daily before breakfast, Jaquan Ríos MD, 40 mg at 10/22/23 0554    piperacillin-tazobactam-dextrose (Zosyn) IV 4.5 g, 4.5 g, intravenous, q8h, Jaquan  JT Ríos MD, Stopped at 10/22/23 1152    polyethylene glycol (Glycolax, Miralax) packet 17 g, 17 g, oral, Daily PRN, Jaquan Ríos MD    polyethylene glycol (Glycolax, Miralax) packet 17 g, 17 g, oral, Daily, Olivia Parrish APRN-CNP, 17 g at 10/22/23 0823    rosuvastatin (Crestor) tablet 20 mg, 20 mg, oral, Daily, Jaquan Ríos MD, 20 mg at 10/22/23 0824    tiZANidine (Zanaflex) tablet 2 mg, 2 mg, oral, q6h PRN, MALIK Dobbs-CNP, 2 mg at 10/21/23 0129    vancomycin-diluent combo no.1 (Xellia) IVPB 1,500 mg, 1,500 mg, intravenous, q24h, Jaquan Ríos MD, Stopped at 10/22/23 0723   Assessment/Plan   Principal Problem:    Acute respiratory failure (CMS/HCC)  Active Problems:    Arthritis, multiple joint involvement    Balance disorder    Lumbar canal stenosis    Chronic diastolic congestive heart failure (CMS/HCC)    Dermatitis    Obstructive sleep apnea, adult    Peripheral polyneuropathy    Anemia due to chronic kidney disease    Cellulitis and abscess of lower extremity    Cellulitis of left lower limb    Cutaneous abscess of left lower limb    Malignant neoplasm of unspecified site of unspecified female breast (CMS/HCC)    NATALIYA (obstructive sleep apnea)    Personal history of nicotine dependence    Primary generalized (osteo)arthritis    Abnormal computed tomography scan    Accidental fall    Anxiety    Asthenia    Chronic respiratory failure with hypoxia (CMS/HCC)    Diabetes mellitus type 2 in obese (CMS/HCC)    Gangrene of foot (CMS/[facility])    Dyspnea    Continue current medication we will continue IV antibiotics.  Cultures growing MRSA and Pseudomonas.  Give supportive care.  Dry legs.  Apply Lac-Hydrin lotion.  Control pain.  Continue IV antibiotics.  Patient has COVID-19 but breathing is under control now.  We will treat due to golf-hole, aspiration decubitus precautions.       Jaquan Ríos MD

## 2023-10-22 NOTE — NURSING NOTE
Pt resting in bed, respirations even and unlabored.  2L oxygen on, purewick in place. No distress noted on exam, plan of care ongoing.

## 2023-10-22 NOTE — CARE PLAN
Problem: Fall/Injury  Goal: Be free from injury by end of the shift  Outcome: Progressing  Goal: Verbalize understanding of personal risk factors for fall in the hospital  Outcome: Progressing  Goal: Verbalize understanding of risk factor reduction measures to prevent injury from fall in the home  Outcome: Progressing  Goal: Use assistive devices by end of the shift  Outcome: Progressing  Goal: Pace activities to prevent fatigue by end of the shift  Outcome: Progressing     Problem: Pain  Goal: My pain/discomfort is manageable  Outcome: Progressing     Problem: Safety  Goal: Patient will be injury free during hospitalization  Outcome: Progressing  Goal: I will remain free of falls  Outcome: Progressing     Problem: Daily Care  Goal: Daily care needs are met  Outcome: Progressing     Problem: Psychosocial Needs  Goal: Demonstrates ability to cope with hospitalization/illness  Outcome: Progressing  Goal: Collaborate with me, my family, and caregiver to identify my specific goals  Outcome: Progressing     Problem: Discharge Barriers  Goal: My discharge needs are met  Outcome: Progressing     Problem: Skin  Goal: Decreased wound size/increased tissue granulation at next dressing change  Outcome: Progressing  Goal: Participates in plan/prevention/treatment measures  Outcome: Progressing  Goal: Prevent/manage excess moisture  Outcome: Progressing  Goal: Prevent/minimize sheer/friction injuries  Outcome: Progressing  Goal: Promote/optimize nutrition  Outcome: Progressing  Goal: Promote skin healing  Outcome: Progressing     Problem: Respiratory  Goal: Clear secretions with interventions this shift  Outcome: Progressing  Goal: Minimize anxiety/maximize coping throughout shift  Outcome: Progressing  Goal: Minimal/no exertional discomfort or dyspnea this shift  Outcome: Progressing  Goal: Verbalize decreased shortness of breath this shift  Outcome: Progressing  Goal: Wean oxygen to maintain O2 saturation per order/standard  this shift  Outcome: Progressing  Goal: Increase self care and/or family involvement in next 24 hours  Outcome: Progressing  Goal: No signs of respiratory distress (eg. Use of accessory muscles. Peds grunting)  Outcome: Progressing     Problem: Bathing  Goal: STG - Patient will bathe body UB/LB independent with AE as needed  Outcome: Progressing     Problem: Dressings Lower Extremities  Goal: LTG - Patient will utilize adaptive techniques/equipment to dress lower body independently  Outcome: Progressing     Problem: Toileting  Goal: LTG - Patient will demonstrate use of appropriate intervention for safe toileting independent with 2ww  Outcome: Progressing   The patient's goals for the shift include pain management    The clinical goals for the shift include dressing change    Over the shift, the patient did not make progress toward the following goals. Barriers to progression include mobility. Recommendations to address these barriers include pt/ot.

## 2023-10-22 NOTE — PROGRESS NOTES
Samira López is a 83 y.o. female on day 7 of admission presenting with Acute respiratory failure (CMS/HCC).    Subjective   Interval History:   Remains afebrile.  Room not entered, limit exposure  No respiratory distress noted  Discussed with nursing      Objective   Range of Vitals (last 24 hours)  Heart Rate:  [51-74]   Temp:  [36.2 °C (97.2 °F)-36.9 °C (98.4 °F)]   Resp:  [17-24]   BP: (158-209)/(60-92)   SpO2:  [95 %-98 %]   Daily Weight  10/21/23 : 89.2 kg (196 lb 9.4 oz)    Body mass index is 27.81 kg/m².    Physical Exam        Antibiotics  piperacillin-tazobactam-dextrose (Zosyn) IV 3.375 g  vancomycin-diluent combo no.1 (Xellia) IVPB 1,250 mg  acetaminophen (Tylenol) tablet 650 mg  furosemide (Lasix) injection 40 mg  dexAMETHasone (Decadron) injection 10 mg  amLODIPine (Norvasc) tablet 5 mg  apixaban (Eliquis) tablet 2.5 mg  clopidogrel (Plavix) tablet 75 mg  ketorolac (Acular) 0.5 % ophthalmic solution 1 drop  metoprolol succinate XL (Toprol-XL) 24 hr tablet 50 mg  pantoprazole (ProtoNix) EC tablet 40 mg  rosuvastatin (Crestor) tablet 20 mg  aspirin chewable tablet 81 mg  calcium carbonate (Tums) chewable tablet 500 mg  ferrous gluconate (Fergon) 324 (38 Fe) mg tablet 324 mg  omega 3-dha-epa-fish oil 1,200 (144-216) mg capsule 3,600 mg  furosemide (Lasix) tablet 40 mg  oxygen (O2) therapy  cholecalciferol (Vitamin D-3) capsule 50 mcg  biotin tablet 1,000 mcg  clobetasol (Temovate) 0.05 % ointment 1 Application  lisinopril tablet 20 mg  palbociclib (Ibrance) tablet 100 mg  sodium hypochlorite (Dakin's HALF-Strength) 0.25 % external solution  palbociclib (Ibrance) tablet 100 mg  acetaminophen (Tylenol) tablet 325 mg  ascorbic acid (Vitamin C) tablet 500 mg  diphenhydrAMINE (Sominex) tablet 25 mg  exemestane (Aromasin) tablet 25 mg  latanoprost (Xalatan) 0.005 % ophthalmic solution 1 drop  dorzolamide (Trusopt) 2 % ophthalmic solution 1 drop  cholecalciferol (Vitamin D-3) tablet 125 mcg  pantoprazole  (ProtoNix) EC tablet 40 mg  oxybutynin (Ditropan) tablet 5 mg  palbociclib (Ibrance) capsule 125 mg  tiZANidine (Zanaflex) tablet 2 mg  piperacillin-tazobactam-dextrose (Zosyn) IV 4.5 g  ipratropium-albuteroL (Duo-Neb) 0.5-2.5 mg/3 mL nebulizer solution 3 mL  dexAMETHasone (Decadron) tablet 6 mg  remdesivir (Veklury) 200 mg in sodium chloride 0.9% 250 mL IV  remdesivir (Veklury) 100 mg in sodium chloride 0.9% 250 mL IV  vancomycin-diluent combo no.1 (Xellia) IVPB 750 mg  acetaminophen (Tylenol) tablet 650 mg  acetaminophen (Tylenol) oral liquid 650 mg  acetaminophen (Tylenol) suppository 650 mg  polyethylene glycol (Glycolax, Miralax) packet 17 g  benzocaine-menthol (Cepastat Sore Throat) 15-3.6 mg lozenge 1 lozenge  dextromethorphan-guaifenesin (Robitussin DM)  mg/5 mL oral liquid 5 mL  guaiFENesin (Mucinex) 12 hr tablet 600 mg  dextrose 50 % injection 25 g  glucagon (Glucagen) injection 1 mg  dextrose 10 % in water (D10W) infusion  insulin lispro (HumaLOG) injection 0-15 Units  piperacillin-tazobactam-dextrose (Zosyn) IV 4.5 g  ipratropium-albuteroL (Duo-Neb) 0.5-2.5 mg/3 mL nebulizer solution 3 mL  lidocaine (Xylocaine) 10 mg/mL (1 %) injection 10 mg  alteplase (Cathflo Activase) injection 2 mg      oxyCODONE (Roxicodone) immediate release tablet  polyethylene glycol (Glycolax, Miralax) packet  oxyCODONE (Roxicodone) immediate release tablet 5 mg  iron sucrose (Venofer) 200 mg in sodium chloride 0.9% 100 mL IVPB  iron sucrose (Venofer) injection 200 mg  oxygen (O2) therapy  vancomycin-diluent combo no.1 (Xellia) IVPB 1 g  polyethylene glycol (Glycolax, Miralax) packet 17 g  tiZANidine (Zanaflex) tablet 2 mg  dilTIAZem (Cardizem) 125 mg in dextrose 5% 125 mL (1 mg/mL) infusion (premix)  dilTIAZem (Cardizem) injection 15 mg  vancomycin-diluent combo no.1 (Xellia) IVPB 1,250 mg  cholecalciferol (Vitamin D-3) tablet 2,000 Units      Relevant Results  Labs  Results from last 72 hours   Lab Units 10/21/23  0057  10/20/23  0157   WBC AUTO x10*3/uL 8.8 7.9   HEMOGLOBIN g/dL 8.9* 8.6*   HEMATOCRIT % 28.8* 28.4*   PLATELETS AUTO x10*3/uL 214 211       Results from last 72 hours   Lab Units 10/21/23  0057 10/20/23  0157   SODIUM mmol/L 139 139   POTASSIUM mmol/L 3.5 3.7   CHLORIDE mmol/L 111* 108*   CO2 mmol/L 20* 21*   BUN mg/dL 18 18   CREATININE mg/dL 0.60 0.60   GLUCOSE mg/dL 192* 170*   CALCIUM mg/dL 7.4* 8.0*   ANION GAP mmol/L 8 10   EGFR mL/min/1.73m*2 90 90           Estimated Creatinine Clearance: 88.4 mL/min (by C-G formula based on SCr of 0.6 mg/dL).  C-Reactive Protein   Date Value Ref Range Status   10/16/2023 5.90 (H) 0.00 - 2.00 mg/dL Final     CRP   Date Value Ref Range Status   09/09/2023 6.3 (H) 0 - 2.0 MG/DL Final     Comment:     Performed at [address] [address]   04/24/2020 0.18 mg/dL Final     Comment:     REF VALUE  < 1.00       Microbiology  Susceptibility data from last 14 days.  Collected Specimen Info Organism Aztreonam Cefepime Ceftazidime Ciprofloxacin Clindamycin Erythromycin Gentamicin Levofloxacin Oxacillin Piperacillin/Tazobactam Tetracycline Tobramycin Trimethoprim/Sulfamethoxazole Vancomycin   10/16/23 Tissue/Biopsy from Diabetic Foot Ulcer Methicillin Resistant Staphylococcus aureus (MRSA)     S R   R  R  R S     Pseudomonas aeruginosa S S S S   S S  S  S     10/15/23 Tissue/Biopsy from Wound/Tissue Pseudomonas aeruginosa S S S S   S S  S  S       Mixed Gram-Positive and Gram-Negative Bacteria                   MRSA PCR negative  Wound culture sent 10/15/2023 with rare polymorphonuclear leukocytes, mixed gram-positive and gram-negative bacteria, pansensitive Pseudomonas  Wound culture sent 10/16/2023 pending with Pseudomonas, MRSA  Blood cultures finalized with no growth - sent 10/15/2023  Imaging  Reviewed    Assessment/Plan     Acute on chronic respiratory failure  Coronavirus infection  Marginal leukocytosis-resolved  Left diabetic foot ulcer-Albrecht grade  3  Possible left foot cellulitis-culture growing Pseudomonas, gram-positive cocci       Continue dexamethasone-total of 10 days  Completed remdesivir course  Supplemental oxygen as needed  Local care  Glycemic control  Continue vancomycin-MRSA coverage  Continue Zosyn-pseudomonas coverage  Follow-up finalized wound culture  Supportive care  Podiatry following  Monitor temperature and WBC  Droplet plus precautions  Discharge plan unless finalized culture dictates otherwise: Zosyn 4.5 gm IV q8h for a total of 2 weeks through 10/30/23, vancomycin IV as ordered through 10/30/23, CBC with diff/CMP/vancomycin trough weekly.  Discharge rec updated.   Discussed with primary and Dr Kira Simon, APRN-CNP

## 2023-10-22 NOTE — NURSING NOTE
Assumed care. Pt sitting in bed with no s/s of distress, no needs voiced at this time. Dressing to left leg is dry and intact with both legs in boots. 2L oxygen on per order, purewick in place. Bed alarm set for safety and call light in reach

## 2023-10-23 LAB
ANION GAP SERPL CALC-SCNC: 10 MMOL/L
BASOPHILS # BLD AUTO: 0.04 X10*3/UL (ref 0–0.1)
BASOPHILS NFR BLD AUTO: 0.3 %
BUN SERPL-MCNC: 19 MG/DL (ref 8–25)
CALCIUM SERPL-MCNC: 8.6 MG/DL (ref 8.5–10.4)
CHLORIDE SERPL-SCNC: 102 MMOL/L (ref 97–107)
CO2 SERPL-SCNC: 22 MMOL/L (ref 24–31)
CREAT SERPL-MCNC: 0.7 MG/DL (ref 0.4–1.6)
EOSINOPHIL # BLD AUTO: 0 X10*3/UL (ref 0–0.4)
EOSINOPHIL NFR BLD AUTO: 0 %
ERYTHROCYTE [DISTWIDTH] IN BLOOD BY AUTOMATED COUNT: 18.5 % (ref 11.5–14.5)
GFR SERPL CREATININE-BSD FRML MDRD: 86 ML/MIN/1.73M*2
GLUCOSE BLD MANUAL STRIP-MCNC: 115 MG/DL (ref 74–99)
GLUCOSE BLD MANUAL STRIP-MCNC: 135 MG/DL (ref 74–99)
GLUCOSE BLD MANUAL STRIP-MCNC: 146 MG/DL (ref 74–99)
GLUCOSE BLD MANUAL STRIP-MCNC: 214 MG/DL (ref 74–99)
GLUCOSE BLD MANUAL STRIP-MCNC: 262 MG/DL (ref 74–99)
GLUCOSE SERPL-MCNC: 291 MG/DL (ref 65–99)
HCT VFR BLD AUTO: 30.6 % (ref 36–46)
HGB BLD-MCNC: 10.4 G/DL (ref 12–16)
IMM GRANULOCYTES # BLD AUTO: 0.55 X10*3/UL (ref 0–0.5)
IMM GRANULOCYTES NFR BLD AUTO: 4.1 % (ref 0–0.9)
LYMPHOCYTES # BLD AUTO: 0.99 X10*3/UL (ref 0.8–3)
LYMPHOCYTES NFR BLD AUTO: 7.4 %
MCH RBC QN AUTO: 33.5 PG (ref 26–34)
MCHC RBC AUTO-ENTMCNC: 34 G/DL (ref 32–36)
MCV RBC AUTO: 99 FL (ref 80–100)
MONOCYTES # BLD AUTO: 0.65 X10*3/UL (ref 0.05–0.8)
MONOCYTES NFR BLD AUTO: 4.8 %
NEUTROPHILS # BLD AUTO: 11.23 X10*3/UL (ref 1.6–5.5)
NEUTROPHILS NFR BLD AUTO: 83.4 %
NRBC BLD-RTO: 0.1 /100 WBCS (ref 0–0)
PLATELET # BLD AUTO: 254 X10*3/UL (ref 150–450)
PMV BLD AUTO: 11.2 FL (ref 7.5–11.5)
POTASSIUM SERPL-SCNC: 4 MMOL/L (ref 3.4–5.1)
RBC # BLD AUTO: 3.1 X10*6/UL (ref 4–5.2)
SODIUM SERPL-SCNC: 134 MMOL/L (ref 133–145)
VANCOMYCIN SERPL-MCNC: 14.7 UG/ML (ref 10–20)
WBC # BLD AUTO: 13.5 X10*3/UL (ref 4.4–11.3)

## 2023-10-23 PROCEDURE — 2500000004 HC RX 250 GENERAL PHARMACY W/ HCPCS (ALT 636 FOR OP/ED): Performed by: INTERNAL MEDICINE

## 2023-10-23 PROCEDURE — 85025 COMPLETE CBC W/AUTO DIFF WBC: CPT | Performed by: INTERNAL MEDICINE

## 2023-10-23 PROCEDURE — 80048 BASIC METABOLIC PNL TOTAL CA: CPT | Performed by: INTERNAL MEDICINE

## 2023-10-23 PROCEDURE — 97116 GAIT TRAINING THERAPY: CPT | Mod: GP,CQ

## 2023-10-23 PROCEDURE — 1200000002 HC GENERAL ROOM WITH TELEMETRY DAILY

## 2023-10-23 PROCEDURE — 9420000001 HC RT PATIENT EDUCATION 5 MIN

## 2023-10-23 PROCEDURE — 2500000002 HC RX 250 W HCPCS SELF ADMINISTERED DRUGS (ALT 637 FOR MEDICARE OP, ALT 636 FOR OP/ED): Performed by: INTERNAL MEDICINE

## 2023-10-23 PROCEDURE — 97535 SELF CARE MNGMENT TRAINING: CPT | Mod: GO,CO

## 2023-10-23 PROCEDURE — 82947 ASSAY GLUCOSE BLOOD QUANT: CPT

## 2023-10-23 PROCEDURE — 2500000001 HC RX 250 WO HCPCS SELF ADMINISTERED DRUGS (ALT 637 FOR MEDICARE OP): Performed by: INTERNAL MEDICINE

## 2023-10-23 PROCEDURE — 80202 ASSAY OF VANCOMYCIN: CPT | Performed by: INTERNAL MEDICINE

## 2023-10-23 PROCEDURE — 94640 AIRWAY INHALATION TREATMENT: CPT

## 2023-10-23 PROCEDURE — 97530 THERAPEUTIC ACTIVITIES: CPT | Mod: GP,CQ

## 2023-10-23 RX ADMIN — PIPERACILLIN SODIUM AND TAZOBACTAM SODIUM 4.5 G: 4; .5 INJECTION, SOLUTION INTRAVENOUS at 01:07

## 2023-10-23 RX ADMIN — OXYBUTYNIN CHLORIDE 5 MG: 5 TABLET ORAL at 21:33

## 2023-10-23 RX ADMIN — Medication 324 MG: at 17:01

## 2023-10-23 RX ADMIN — IPRATROPIUM BROMIDE AND ALBUTEROL SULFATE 3 ML: 2.5; .5 SOLUTION RESPIRATORY (INHALATION) at 20:13

## 2023-10-23 RX ADMIN — DEXAMETHASONE 6 MG: 6 TABLET ORAL at 09:01

## 2023-10-23 RX ADMIN — OXYBUTYNIN CHLORIDE 5 MG: 5 TABLET ORAL at 09:01

## 2023-10-23 RX ADMIN — PIPERACILLIN SODIUM AND TAZOBACTAM SODIUM 4.5 G: 4; .5 INJECTION, SOLUTION INTRAVENOUS at 17:01

## 2023-10-23 RX ADMIN — PANTOPRAZOLE SODIUM 40 MG: 40 TABLET, DELAYED RELEASE ORAL at 05:04

## 2023-10-23 RX ADMIN — ASPIRIN 81 MG CHEWABLE TABLET 81 MG: 81 TABLET CHEWABLE at 09:01

## 2023-10-23 RX ADMIN — INSULIN LISPRO 9 UNITS: 100 INJECTION, SOLUTION INTRAVENOUS; SUBCUTANEOUS at 09:02

## 2023-10-23 RX ADMIN — KETOROLAC TROMETHAMINE 1 DROP: 0.5 SOLUTION OPHTHALMIC at 21:33

## 2023-10-23 RX ADMIN — DEXAMETHASONE 6 MG: 6 TABLET ORAL at 17:01

## 2023-10-23 RX ADMIN — PIPERACILLIN SODIUM AND TAZOBACTAM SODIUM 4.5 G: 4; .5 INJECTION, SOLUTION INTRAVENOUS at 09:08

## 2023-10-23 RX ADMIN — ROSUVASTATIN CALCIUM 20 MG: 20 TABLET, COATED ORAL at 09:01

## 2023-10-23 RX ADMIN — KETOROLAC TROMETHAMINE 1 DROP: 0.5 SOLUTION OPHTHALMIC at 06:12

## 2023-10-23 RX ADMIN — Medication 2000 UNITS: at 09:01

## 2023-10-23 RX ADMIN — APIXABAN 2.5 MG: 2.5 TABLET, FILM COATED ORAL at 09:01

## 2023-10-23 RX ADMIN — KETOROLAC TROMETHAMINE 1 DROP: 0.5 SOLUTION OPHTHALMIC at 17:01

## 2023-10-23 RX ADMIN — DORZOLAMIDE HYDROCHLORIDE 1 DROP: 20 SOLUTION/ DROPS OPHTHALMIC at 21:33

## 2023-10-23 RX ADMIN — IPRATROPIUM BROMIDE AND ALBUTEROL SULFATE 3 ML: 2.5; .5 SOLUTION RESPIRATORY (INHALATION) at 12:49

## 2023-10-23 RX ADMIN — VANCOMYCIN 1500 MG: 1.5 INJECTION, SOLUTION INTRAVENOUS at 05:03

## 2023-10-23 RX ADMIN — IPRATROPIUM BROMIDE AND ALBUTEROL SULFATE 3 ML: 2.5; .5 SOLUTION RESPIRATORY (INHALATION) at 06:40

## 2023-10-23 RX ADMIN — OXYCODONE HYDROCHLORIDE AND ACETAMINOPHEN 500 MG: 500 TABLET ORAL at 09:01

## 2023-10-23 RX ADMIN — KETOROLAC TROMETHAMINE 1 DROP: 0.5 SOLUTION OPHTHALMIC at 13:59

## 2023-10-23 RX ADMIN — DORZOLAMIDE HYDROCHLORIDE 1 DROP: 20 SOLUTION/ DROPS OPHTHALMIC at 09:02

## 2023-10-23 RX ADMIN — LATANOPROST 1 DROP: 50 SOLUTION OPHTHALMIC at 21:58

## 2023-10-23 RX ADMIN — APIXABAN 2.5 MG: 2.5 TABLET, FILM COATED ORAL at 21:33

## 2023-10-23 RX ADMIN — OXYCODONE HYDROCHLORIDE 5 MG: 5 TABLET ORAL at 09:16

## 2023-10-23 RX ADMIN — EXEMESTANE 25 MG: 25 TABLET ORAL at 09:01

## 2023-10-23 RX ADMIN — Medication 324 MG: at 09:01

## 2023-10-23 ASSESSMENT — PAIN SCALES - GENERAL
PAINLEVEL_OUTOF10: 6
PAINLEVEL_OUTOF10: 8
PAINLEVEL_OUTOF10: 0 - NO PAIN
PAINLEVEL_OUTOF10: 0 - NO PAIN

## 2023-10-23 ASSESSMENT — COGNITIVE AND FUNCTIONAL STATUS - GENERAL
MOVING FROM LYING ON BACK TO SITTING ON SIDE OF FLAT BED WITH BEDRAILS: A LITTLE
TURNING FROM BACK TO SIDE WHILE IN FLAT BAD: A LITTLE
DRESSING REGULAR UPPER BODY CLOTHING: A LITTLE
STANDING UP FROM CHAIR USING ARMS: A LITTLE
MOVING TO AND FROM BED TO CHAIR: A LOT
DRESSING REGULAR LOWER BODY CLOTHING: A LITTLE
WALKING IN HOSPITAL ROOM: A LOT
TOILETING: A LOT
MOBILITY SCORE: 15
TURNING FROM BACK TO SIDE WHILE IN FLAT BAD: A LITTLE
MOVING TO AND FROM BED TO CHAIR: A LITTLE
DRESSING REGULAR LOWER BODY CLOTHING: A LITTLE
MOVING FROM LYING ON BACK TO SITTING ON SIDE OF FLAT BED WITH BEDRAILS: A LITTLE
WALKING IN HOSPITAL ROOM: A LITTLE
PERSONAL GROOMING: A LITTLE
DAILY ACTIVITIY SCORE: 18
STANDING UP FROM CHAIR USING ARMS: A LOT
WALKING IN HOSPITAL ROOM: A LOT
MOVING TO AND FROM BED TO CHAIR: A LITTLE
CLIMB 3 TO 5 STEPS WITH RAILING: A LOT
CLIMB 3 TO 5 STEPS WITH RAILING: TOTAL
MOVING FROM LYING ON BACK TO SITTING ON SIDE OF FLAT BED WITH BEDRAILS: A LITTLE
DRESSING REGULAR UPPER BODY CLOTHING: A LITTLE
TOILETING: A LOT
DAILY ACTIVITIY SCORE: 18
STANDING UP FROM CHAIR USING ARMS: A LITTLE
HELP NEEDED FOR BATHING: A LITTLE
TURNING FROM BACK TO SIDE WHILE IN FLAT BAD: A LITTLE
MOBILITY SCORE: 14
PERSONAL GROOMING: A LITTLE
HELP NEEDED FOR BATHING: A LITTLE
CLIMB 3 TO 5 STEPS WITH RAILING: TOTAL
MOBILITY SCORE: 16

## 2023-10-23 ASSESSMENT — PAIN - FUNCTIONAL ASSESSMENT
PAIN_FUNCTIONAL_ASSESSMENT: 0-10
PAIN_FUNCTIONAL_ASSESSMENT: WONG-BAKER FACES

## 2023-10-23 NOTE — PROGRESS NOTES
Pt with precert pending to [facility]. Updated therapy notes are needed for pt to go to facility. Therapy aware. This worker will send updates to facility once they are available.    [facility] updated facility with regards to clinicals, also advised pt not currently taking her chemo med Ibrance and that she will check in with her doctors for when she will resume, made them aware pt will not need to be on the expensive med that they can't cover when she admits to them. Precert pending.

## 2023-10-23 NOTE — NURSING NOTE
Assumed care of pt. Pt sleeping in bed. NAD. No signs of pain per the FLACC scale. 3LNC. Covid isolation in place. BSSR completed

## 2023-10-23 NOTE — CARE PLAN
Problem: Respiratory  Goal: Clear secretions with interventions this shift  Outcome: Progressing  Goal: Minimize anxiety/maximize coping throughout shift  Outcome: Progressing  Goal: Minimal/no exertional discomfort or dyspnea this shift  Outcome: Progressing  Goal: Verbalize decreased shortness of breath this shift  Outcome: Progressing

## 2023-10-23 NOTE — CARE PLAN
The patient's goals for the shift include pain management    The clinical goals for the shift include rest    Over the shift, the patient did not make progress toward the following goals. Barriers to progression include not feeling well. Recommendations to address these barriers include rest.

## 2023-10-23 NOTE — PROGRESS NOTES
Samira López is a 83 y.o. female on day 8 of admission presenting with Acute respiratory failure (CMS/HCC).    Subjective   Interval History:   Afebrile, no chills  No significant cough or chest pain  No nausea vomiting or diarrhea        Review of Systems   All other systems reviewed and are negative.      Objective   Range of Vitals (last 24 hours)  Heart Rate:  [58-72]   Temp:  [35.9 °C (96.6 °F)-36.6 °C (97.9 °F)]   Resp:  [18-23]   BP: (138-176)/(34-66)   Weight:  [86.9 kg (191 lb 9.3 oz)]   SpO2:  [93 %-97 %]   Daily Weight  10/23/23 : 86.9 kg (191 lb 9.3 oz)    Body mass index is 27.1 kg/m².    Physical Exam  Constitutional:       Appearance: Normal appearance.   HENT:      Head: Normocephalic and atraumatic.      Nose: Nose normal.   Eyes:      Extraocular Movements: Extraocular movements intact.      Conjunctiva/sclera: Conjunctivae normal.   Cardiovascular:      Rate and Rhythm: Normal rate and regular rhythm.      Pulses: Normal pulses.      Heart sounds: Normal heart sounds.   Pulmonary:      Breath sounds: Examination of the right-upper field reveals decreased breath sounds. Examination of the left-upper field reveals decreased breath sounds. Examination of the right-middle field reveals decreased breath sounds. Examination of the left-middle field reveals decreased breath sounds. Examination of the right-lower field reveals decreased breath sounds. Examination of the left-lower field reveals decreased breath sounds. Decreased breath sounds present.   Abdominal:      General: Bowel sounds are normal.      Palpations: Abdomen is soft.   Musculoskeletal:      Cervical back: Normal range of motion and neck supple.      Left lower leg: Edema present.   Skin:     Comments: Left leg and foot dressing   Neurological:      Mental Status: She is alert and oriented to person, place, and time. Mental status is at baseline.   Psychiatric:         Mood and Affect: Mood normal.         Behavior: Behavior normal.      Antibiotics  piperacillin-tazobactam-dextrose (Zosyn) IV 3.375 g  vancomycin-diluent combo no.1 (Xellia) IVPB 1,250 mg  acetaminophen (Tylenol) tablet 650 mg  furosemide (Lasix) injection 40 mg  dexAMETHasone (Decadron) injection 10 mg  amLODIPine (Norvasc) tablet 5 mg  apixaban (Eliquis) tablet 2.5 mg  clopidogrel (Plavix) tablet 75 mg  ketorolac (Acular) 0.5 % ophthalmic solution 1 drop  metoprolol succinate XL (Toprol-XL) 24 hr tablet 50 mg  pantoprazole (ProtoNix) EC tablet 40 mg  rosuvastatin (Crestor) tablet 20 mg  aspirin chewable tablet 81 mg  calcium carbonate (Tums) chewable tablet 500 mg  ferrous gluconate (Fergon) 324 (38 Fe) mg tablet 324 mg  omega 3-dha-epa-fish oil 1,200 (144-216) mg capsule 3,600 mg  furosemide (Lasix) tablet 40 mg  oxygen (O2) therapy  cholecalciferol (Vitamin D-3) capsule 50 mcg  biotin tablet 1,000 mcg  clobetasol (Temovate) 0.05 % ointment 1 Application  lisinopril tablet 20 mg  palbociclib (Ibrance) tablet 100 mg  sodium hypochlorite (Dakin's HALF-Strength) 0.25 % external solution  palbociclib (Ibrance) tablet 100 mg  acetaminophen (Tylenol) tablet 325 mg  ascorbic acid (Vitamin C) tablet 500 mg  diphenhydrAMINE (Sominex) tablet 25 mg  exemestane (Aromasin) tablet 25 mg  latanoprost (Xalatan) 0.005 % ophthalmic solution 1 drop  dorzolamide (Trusopt) 2 % ophthalmic solution 1 drop  cholecalciferol (Vitamin D-3) tablet 125 mcg  pantoprazole (ProtoNix) EC tablet 40 mg  oxybutynin (Ditropan) tablet 5 mg  palbociclib (Ibrance) capsule 125 mg  tiZANidine (Zanaflex) tablet 2 mg  piperacillin-tazobactam-dextrose (Zosyn) IV 4.5 g  ipratropium-albuteroL (Duo-Neb) 0.5-2.5 mg/3 mL nebulizer solution 3 mL  dexAMETHasone (Decadron) tablet 6 mg  remdesivir (Veklury) 200 mg in sodium chloride 0.9% 250 mL IV  remdesivir (Veklury) 100 mg in sodium chloride 0.9% 250 mL IV  vancomycin-diluent combo no.1 (Xellia) IVPB 750 mg  acetaminophen (Tylenol) tablet 650 mg  acetaminophen (Tylenol) oral  liquid 650 mg  acetaminophen (Tylenol) suppository 650 mg  polyethylene glycol (Glycolax, Miralax) packet 17 g  benzocaine-menthol (Cepastat Sore Throat) 15-3.6 mg lozenge 1 lozenge  dextromethorphan-guaifenesin (Robitussin DM)  mg/5 mL oral liquid 5 mL  guaiFENesin (Mucinex) 12 hr tablet 600 mg  dextrose 50 % injection 25 g  glucagon (Glucagen) injection 1 mg  dextrose 10 % in water (D10W) infusion  insulin lispro (HumaLOG) injection 0-15 Units  piperacillin-tazobactam-dextrose (Zosyn) IV 4.5 g  ipratropium-albuteroL (Duo-Neb) 0.5-2.5 mg/3 mL nebulizer solution 3 mL  lidocaine (Xylocaine) 10 mg/mL (1 %) injection 10 mg  alteplase (Cathflo Activase) injection 2 mg      oxyCODONE (Roxicodone) immediate release tablet  polyethylene glycol (Glycolax, Miralax) packet  oxyCODONE (Roxicodone) immediate release tablet 5 mg  iron sucrose (Venofer) 200 mg in sodium chloride 0.9% 100 mL IVPB  iron sucrose (Venofer) injection 200 mg  oxygen (O2) therapy  vancomycin-diluent combo no.1 (Xellia) IVPB 1 g  polyethylene glycol (Glycolax, Miralax) packet 17 g  tiZANidine (Zanaflex) tablet 2 mg  dilTIAZem (Cardizem) 125 mg in dextrose 5% 125 mL (1 mg/mL) infusion (premix)  dilTIAZem (Cardizem) injection 15 mg  vancomycin-diluent combo no.1 (Xellia) IVPB 1,250 mg  cholecalciferol (Vitamin D-3) tablet 2,000 Units      vancomycin-diluent combo no.1 (Xellia) IVPB 1,500 mg  doxepin (SINEquan) capsule 10 mg  gabapentin (Neurontin) capsule 100 mg      Relevant Results  Labs  Results from last 72 hours   Lab Units 10/23/23  0500 10/21/23  0057   WBC AUTO x10*3/uL 13.5* 8.8   HEMOGLOBIN g/dL 10.4* 8.9*   HEMATOCRIT % 30.6* 28.8*   PLATELETS AUTO x10*3/uL 254 214   NEUTROS PCT AUTO % 83.4  --    LYMPHS PCT AUTO % 7.4  --    MONOS PCT AUTO % 4.8  --    EOS PCT AUTO % 0.0  --      Results from last 72 hours   Lab Units 10/23/23  0500 10/21/23  0057   SODIUM mmol/L 134 139   POTASSIUM mmol/L 4.0 3.5   CHLORIDE mmol/L 102 111*   CO2 mmol/L  22* 20*   BUN mg/dL 19 18   CREATININE mg/dL 0.70 0.60   GLUCOSE mg/dL 291* 192*   CALCIUM mg/dL 8.6 7.4*   ANION GAP mmol/L 10 8   EGFR mL/min/1.73m*2 86 90         Estimated Creatinine Clearance: 74.9 mL/min (by C-G formula based on SCr of 0.7 mg/dL).  C-Reactive Protein   Date Value Ref Range Status   10/16/2023 5.90 (H) 0.00 - 2.00 mg/dL Final     CRP   Date Value Ref Range Status   09/09/2023 6.3 (H) 0 - 2.0 MG/DL Final     Comment:     Performed at [address] [address]   04/24/2020 0.18 mg/dL Final     Comment:     REF VALUE  < 1.00       Microbiology  Susceptibility data from last 14 days.  Collected Specimen Info Organism Aztreonam Cefepime Ceftazidime Ciprofloxacin Clindamycin Erythromycin Gentamicin Levofloxacin Oxacillin Piperacillin/Tazobactam Tetracycline Tobramycin Trimethoprim/Sulfamethoxazole Vancomycin   10/16/23 Tissue/Biopsy from Diabetic Foot Ulcer Methicillin Resistant Staphylococcus aureus (MRSA)     S R   R  R  R S     Pseudomonas aeruginosa S S S S   S S  S  S     10/15/23 Tissue/Biopsy from Wound/Tissue Pseudomonas aeruginosa S S S S   S S  S  S       Mixed Gram-Positive and Gram-Negative Bacteria                   Imaging  Reviewed  Assessment/Plan   Acute on chronic respiratory failure  Coronavirus infection  Marginal leukocytosis-resolved  Left diabetic foot ulcer-Albrecht grade 3  Possible left foot cellulitis-culture growing Pseudomonas, gram-positive cocci        Continue dexamethasone-total of 10 days  Supplemental oxygen as needed  Local care  Glycemic control  Continue vancomycin-MRSA coverage  Continue Zosyn-pseudomonas coverage  Supportive care  Podiatry following  Monitor temperature and WBC  Droplet plus precautions  Discharge plan - Zosyn 4.5 gm IV q8h for a total of 2 weeks through 10/30/23, vancomycin IV as ordered through 10/30/23, CBC with diff/CMP/vancomycin trough weekly.    Carlos Malone MD

## 2023-10-23 NOTE — PROGRESS NOTES
"Samira López is a 83 y.o. female on day 8 of admission presenting with Acute respiratory failure (CMS/HCC).    Subjective   Patient was seen and examined this morning.  Lying comfortably in bed.  Does not appear to be in acute distress.  Complaining of left heel pain.       Physical Exam    Objective     Objective: Patient seen bedside.  Patient is alert aware and oriented x3.  She does not appear to be in acute distress.     Vasc: DP and PT pulses are faintly palpable. Skin is atrophic to the dorsum left foot. Hair growth absent.      Neuro:  sensation intact to left foot to light touch. Muscle strength +5/5.      Derm: Left foot status post transmetatarsal amputation.  The incision appears well healed. There is no signs of ischemia or incisional dehiscence.      She does have several superficial venous stasis ulcerations to the dorsum foot and anterior ankle these all appear mostly granular without significant depth.  There is findings consistent with chronic venous stasis dermatitis.      There is a posterior heel decubitus ulcer measuring roughly 1.7 x 2.5.  This appears to be demarcating.  There is a clear deep tissue injury noted here.  This appears stable and slightly improved from Friday. There is a mixed fibrogranular base.  No active purulent drainage.  Mild serosanginuous drainage. Overall there are no significant signs of bacterial infection involving the left lower extremity.  Improved erythema to the left lower extretmiy. Low concern for ascending cellulitis or infectious process.    Right foot and leg without open ulceration or break in the skin .     Ortho: Significant tenderness on palpation to the multiple ulcerations to the left lower extremity.  Status post transmetatarsal amputation.         Last Recorded Vitals  Blood pressure 176/66, pulse 70, temperature 36.6 °C (97.9 °F), temperature source Temporal, resp. rate 23, height 1.791 m (5' 10.5\"), weight 86.9 kg (191 lb 9.3 oz), SpO2 93 " %.    Intake/Output last 3 Shifts:  I/O last 3 completed shifts:  In: 1980 (22.8 mL/kg) [P.O.:980; IV Piggyback:1000]  Out: 1050 (12.1 mL/kg) [Urine:1050 (0.3 mL/kg/hr)]  Weight: 86.9 kg     Relevant Results           Assessment/Plan   Principal Problem:    Acute respiratory failure (CMS/HCC)  Active Problems:    Arthritis, multiple joint involvement    Balance disorder    Lumbar canal stenosis    Chronic diastolic congestive heart failure (CMS/[facility])    Dermatitis    Obstructive sleep apnea, adult    Peripheral polyneuropathy    Anemia due to chronic kidney disease    Cellulitis and abscess of lower extremity    Cellulitis of left lower limb    Cutaneous abscess of left lower limb    Malignant neoplasm of unspecified site of unspecified female breast (CMS/[facility])    NATALIYA (obstructive sleep apnea)    Personal history of nicotine dependence    Primary generalized (osteo)arthritis    Abnormal computed tomography scan    Accidental fall    Anxiety    Asthenia    Chronic respiratory failure with hypoxia (CMS/HCC)    Diabetes mellitus type 2 in obese (CMS/[facility])    Gangrene of foot (CMS/[facility])    Dyspnea    She has a large anterior ankle and dorsum left foot venous ulcer which appear improved today. Posterior heel decubitus ulceration also appears to be demarcating and improved.  Continue offloading and daily dressing changes.     This was dressed today with betadine to the ulcer base, calcium alginate to the base of the wound ABDs. The anterior ankle and dorsum left foot were dressed with calcium alginate and a well-padded with ABD pads and lightly wrapped Kerlix.  Will hold off on compression today secondary to pressure injuries. PRAFO boots at all times while in bed to bilateral lower extremities.  Okay for full weightbearing utilizing postop shoe to the left foot.  We will continue to follow peripherally.       Anders Loera DPM

## 2023-10-23 NOTE — PROGRESS NOTES
Physical Therapy    Physical Therapy Treatment    Patient Name: Samira López  MRN: [id number]  Today's Date: 10/23/2023  Time Calculation  Start Time: 1001  Stop Time: 1029  Time Calculation (min): 28 min       Assessment/Plan   PT Assessment  PT Assessment Results: Decreased strength, Decreased endurance, Impaired balance, Decreased mobility, Impaired hearing  Rehab Prognosis: Good  Evaluation/Treatment Tolerance: Patient tolerated treatment well  Strengths: Rehab experience, Housing layout  End of Session Communication: Bedside nurse  Assessment Comment: Prior to performing bed mobility pt requesting brief due to incontinence with movement. Pt able to roll right for placement of brief. Dependent assist to carrie surgical shoe and regular shoe. Pt very particular and prefers to perform activity to her standard. Pt remains a fall risk at this time.  End of Session Patient Position: Up in chair  PT Plan  Inpatient/Swing Bed or Outpatient: Inpatient  PT Plan  Treatment/Interventions: Bed mobility, Transfer training, Gait training, Stair training, Balance training, Strengthening, Endurance training, Therapeutic exercise, Therapeutic activity  PT Plan: Skilled PT  PT Frequency: 4 times per week  PT Discharge Recommendations: Moderate intensity level of continued care  PT Recommended Transfer Status: Assist x1 (CGA to Nikita x1 with FWW)      General Visit Information:   PT  Visit  PT Received On: 10/23/23  General  Missed Visit: Yes  Missed Visit Reason: Parent refused  Prior to Session Communication: Bedside nurse  Patient Position Received: Bed, 3 rail up  General Comment: Cleared by nursing to be seen for therapy, pt agreeable with ambulation only however pt very particular and perfers to perform activity at her own pace and style. Supine in bed upon arrival.    Subjective   Precautions:  Precautions  Hearing/Visual Limitations: reading glasses; mildly Stebbins  LE Weight Bearing Status: Heel Weight Bearing in Post-Op Shoe  (LLE in forefoot off loading shoe)  Medical Precautions: Fall precautions, Infection precautions  Precautions Comment: Heel weight bearing with surgical shoe on left.  Vital Signs:       Objective   Pain:  Pain Assessment  Pain Assessment: Gómez-Baker FACES  Pain Score: 8  Pain Type: Acute pain  Pain Location: Foot  Pain Orientation: Left  Pain Interventions: Medication (See MAR) (Pain medication taken prior to tx.)  Cognition:     Postural Control:     Activity Tolerance:     Treatments:       Therapeutic Activity  Therapeutic Activity Performed: No    Balance/Neuromuscular Re-Education  Balance/Neuromuscular Re-Education Activity Performed: Yes  Balance/Neuromuscular Re-Education Activity 1: Fair seated static balance, poor static standing balance with bilateral UE support on walker.    Bed Mobility  Bed Mobility: Yes  Bed Mobility 1  Bed Mobility 1: Supine to sitting  Level of Assistance 1: Close supervision  Bed Mobility Comments 1: Increased time and effort to complete bed mobility.    Ambulation/Gait Training  Ambulation/Gait Training Performed: Yes (15' with wheeled walker, presents with a discontinuous step length, flexed posture, narrow SHEMAR, min assist for balance.)  Transfers  Transfer: Yes  Transfer 1  Transfer From 1: Sit to  Transfer to 1: Stand  Transfer Level of Assistance 1: Minimum assistance  Trials/Comments 1: Min assist for trunk up during sit to stand, cues for proper hand placement, decreased eccentric control in sitting.    Stairs  Stairs: No    Outcome Measures:  [facility] Basic Mobility  Turning from your back to your side while in a flat bed without using bedrails: A little  Moving from lying on your back to sitting on the side of a flat bed without using bedrails: A little  Moving to and from bed to chair (including a wheelchair): A little  Standing up from a chair using your arms (e.g. wheelchair or bedside chair): A little  To walk in hospital room: A little  Climbing 3-5 steps with railing:  Total  Basic Mobility - Total Score: 16    Education Documentation  No documentation found.  Education Comments  No comments found.        OP EDUCATION:       Encounter Problems       Encounter Problems (Active)       Mobility       STG - Patient will ambulate >/= 50 ft with FWW at mod I level. (Progressing)       Start:  10/16/23    Expected End:  10/31/23            STG - Patient will ambulate up and down a curb/step with FWW and CGA x1. (Progressing)       Start:  10/16/23    Expected End:  10/31/23               Mobility       Goal 1Patient will perform functional mobility to and from the bathroom with close supervision and 2ww (Progressing)       Start:  10/16/23    Expected End:  11/16/23               Transfers       STG - Patient to transfer to and from sit to supine at flat bed at mod I level. (Progressing)       Start:  10/16/23    Expected End:  10/31/23            STG - Patient will perform bed mobility at mod I level. (Progressing)       Start:  10/16/23    Expected End:  10/31/23            STG - Patient will transfer sit to and from stand with FWW at mod I level. (Progressing)       Start:  10/16/23    Expected End:  10/31/23               Transfers       LTG - Patient will demonstrate safe transfer techniques independent with 2ww (Progressing)       Start:  10/16/23    Expected End:  11/16/23

## 2023-10-23 NOTE — NURSING NOTE
Am labs drawn at this time.  Order review end of shift completed.  No changes noted since start of shift.  Pt slept for most of the night

## 2023-10-23 NOTE — PROGRESS NOTES
Pulmonary Progress Note    Samira López is a 83 y.o. female on day 8 of admission presenting with Acute respiratory failure (CMS/HCC).    Subjective   Follow up covid  Denies shortness of breath  On 4lpm NC  Objective   Vital Signs      10/22/2023     7:40 PM 10/22/2023     8:30 PM 10/22/2023    11:14 PM 10/23/2023     3:17 AM 10/23/2023     6:41 AM 10/23/2023     8:25 AM 10/23/2023    11:19 AM   Vitals   Systolic  149 150 154  176 138   Diastolic  50 56 65  66 34   Heart Rate 72 69 58 62 70 70 70   Temp  36 °C (96.8 °F) 35.9 °C (96.6 °F) 36 °C (96.8 °F)  36.6 °C (97.9 °F) 36.2 °C (97.2 °F)   Resp 20 20 21 18 18 23 23   Weight (lb)    191.58      BMI    27.1 kg/m2      BSA (m2)    2.08 m2          Oxygen Therapy  SpO2: 97 %  Oxygen Therapy: Supplemental oxygen  O2 Delivery Method: Nasal cannula         Intake/Output previous 24 hours:    Intake/Output Summary (Last 24 hours) at 10/23/2023 1409  Last data filed at 10/23/2023 0633  Gross per 24 hour   Intake 1080 ml   Output 1050 ml   Net 30 ml       Physical Exam  Vitals reviewed.   Constitutional:       General: She is awake.      Appearance: Normal appearance.   HENT:      Head: Normocephalic and atraumatic.   Eyes:      Extraocular Movements: Extraocular movements intact.      Pupils: Pupils are equal, round, and reactive to light.   Cardiovascular:      Rate and Rhythm: Normal rate and regular rhythm.   Pulmonary:      Effort: Pulmonary effort is normal.      Breath sounds: Normal breath sounds.   Abdominal:      General: Bowel sounds are normal.      Palpations: Abdomen is soft.   Musculoskeletal:         General: Normal range of motion.      Cervical back: Normal range of motion and neck supple.   Skin:     General: Skin is warm and dry.   Neurological:      General: No focal deficit present.      Mental Status: She is alert and oriented to person, place, and time.   Psychiatric:         Attention and Perception: Attention and perception normal.          Lines and Tubes:  PICC - Adult 10/16/23 Double lumen Right Brachial vein (Active)   Placement Date/Time: 10/16/23 1630   Hand Hygiene Completed: Yes  Catheter Time Out Checklist Completed: Yes  Size (Fr): 5  Lumen Type: Double lumen  Catheter to Vein Ratio Less Than 50%: Yes  Total Length (cm): 40 cm  External Length (cm): 3 cm  Orie...   Number of days: 6         Scheduled medications  apixaban, 2.5 mg, oral, BID  ascorbic acid, 500 mg, oral, Daily with breakfast  aspirin, 81 mg, oral, Daily  cholecalciferol, 2,000 Units, oral, Daily  dexAMETHasone, 6 mg, oral, BID after meals  dorzolamide, 1 drop, Both Eyes, BID  exemestane, 25 mg, oral, Daily  ferrous gluconate, 324 mg, oral, BID after meals  insulin lispro, 0-15 Units, subcutaneous, TID with meals  ipratropium-albuteroL, 3 mL, nebulization, TID  ketorolac, 1 drop, Both Eyes, 4x daily  latanoprost, 1 drop, Both Eyes, Nightly  lidocaine, 1 mL, infiltration, Once  oxybutynin, 5 mg, oral, BID  pantoprazole, 40 mg, oral, Daily before breakfast  piperacillin-tazobactam, 4.5 g, intravenous, q8h  polyethylene glycol, 17 g, oral, Daily  rosuvastatin, 20 mg, oral, Daily  vancomycin-diluent combo no.1, 1,500 mg, intravenous, q24h      Continuous medications  oxygen, , Last Rate: 3 L/min (10/17/23 1115)      PRN medications  PRN medications: acetaminophen **OR** acetaminophen **OR** acetaminophen, acetaminophen, alteplase, benzocaine-menthol, dextromethorphan-guaifenesin, dextrose 10 % in water (D10W), dextrose, diphenhydrAMINE, glucagon, guaiFENesin, oxyCODONE, polyethylene glycol, tiZANidine    Relevant Results  Results from last 7 days   Lab Units 10/23/23  0500 10/21/23  0057 10/20/23  0157   WBC AUTO x10*3/uL 13.5* 8.8 7.9   HEMOGLOBIN g/dL 10.4* 8.9* 8.6*   HEMATOCRIT % 30.6* 28.8* 28.4*   PLATELETS AUTO x10*3/uL 254 214 211      Results from last 7 days   Lab Units 10/23/23  0500 10/21/23  0057 10/20/23  0157   SODIUM mmol/L 134 139 139   POTASSIUM mmol/L 4.0  3.5 3.7   CHLORIDE mmol/L 102 111* 108*   CO2 mmol/L 22* 20* 21*   BUN mg/dL 19 18 18   CREATININE mg/dL 0.70 0.60 0.60   GLUCOSE mg/dL 291* 192* 170*   CALCIUM mg/dL 8.6 7.4* 8.0*          XR chest 1 view 10/15/2023    Narrative  Interpreted By:  Joshua Galeana,  STUDY:  XR CHEST 1 VIEW; 10/15/2023 5:30 pm    INDICATION:  Signs/Symptoms:dyspnea    COMPARISON:  May 2023    ACCESSION NUMBER(S):  [id number]    ORDERING CLINICIAN:  JF MULLINS    FINDINGS:  The study is limited due to rotation and poor inspiratory effort,  with resultant crowding of the pulmonary vasculature. The cardiac  silhouette is borderline prominent, exaggerated by the technique.  Mild bilateral perihilar interstitial infiltrates are noted. There is  no pneumothorax or significant effusion. The osseous structures are  unremarkable.    Impression  Allowing for the aforementioned limitation, bilateral perihilar  interstitial infiltrates, most likely due to mild/early pulmonary  edema; correlate clinically and follow-up as needed.    Signed by: Joshua Galeana 10/15/2023 5:34 PM  Dictation workstation:   [id number]      Patient Active Problem List   Diagnosis    Arthritis, multiple joint involvement    Anemia, macrocytic    Balance disorder    Benign essential hypertension    Breast cancer metastasized to bone (CMS/HCC)    Carcinoma of left breast metastatic to axillary lymph node (CMS/HCC)    Cervical spinal stenosis    Lumbar canal stenosis    Chronic diastolic congestive heart failure (CMS/HCC)    Chronic venous insufficiency    Critical lower limb ischemia (CMS/HCC)    Edema leg    Esophageal reflux    Dermatitis    Hyperlipidemia    Ischemic ulcer of foot due to atherosclerosis of lower extremity (CMS/HCC)    Mild stage glaucoma    Obstructive sleep apnea, adult    Peripheral polyneuropathy    Peripheral vascular disease (CMS/HCC)    Xerosis of skin    Status post left breast lumpectomy    Sciatica of left side    Pseudophakia, both eyes     Anemia due to chronic kidney disease    BMI 30.0-30.9,adult    Cellulitis and abscess of lower extremity    Cellulitis of left lower limb    Chronic diastolic (congestive) heart failure (CMS/HCC)    Stage 3 chronic kidney disease (CMS/HCC)    Cutaneous abscess of left lower limb    History of falling    Hypertensive heart and chronic kidney disease with heart failure and stage 1 through stage 4 chronic kidney disease, or chronic kidney disease (CMS/HCC)    Hyperlipidemia, unspecified    Lobular carcinoma in situ of left breast    Long term (current) use of anticoagulants    Malignant neoplasm of unspecified site of unspecified female breast (CMS/HCC)    Muscle weakness    Nutritional anemia    Obesity    NATALIYA (obstructive sleep apnea)    Open wound of knee, leg (except thigh), and ankle    Other pulmonary embolism without acute cor pulmonale (CMS/HCC)    Other specified postprocedural states    Palliative care status    Personal history of nicotine dependence    Primary generalized (osteo)arthritis    SCC (squamous cell carcinoma)    Sciatica, left side    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)    Secondary malignant neoplasm of bone (CMS/HCC)    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)    Type 2 DM with diabetic peripheral angiopathy w/o gangrene (CMS/HCC)    Glaucoma    Abnormal computed tomography scan    Accidental fall    Anxiety    Asthenia    Blister of ankle    Blister of leg    Gangrene (CMS/HCC)    Candidal intertrigo    Cellulitis    Chronic obstructive lung disease (CMS/HCC)    Chronic respiratory failure with hypoxia (CMS/HCC)    Cold feeling    Diabetes mellitus type 2 in obese (CMS/HCC)    Type 2 diabetes mellitus with ulcer (CMS/HCC)    Foot pain    Hip pain    Pain in toe    Gangrene of foot (CMS/HCC)    History of malignant neoplasm of breast    History of pulmonary embolism    Hyperkalemia    Hypoxia    Limb  ischemia    Pain of left lower leg    Primary open angle glaucoma (POAG) of both eyes, mild stage    Puncture wound without foreign body, left foot, initial encounter    Puncture wound without foreign body, right lower leg, subsequent encounter    Toxic metabolic encephalopathy    Venous stasis    Sepsis (CMS/HCC)    Edema of lower extremity    Glaucoma suspect, both eyes    Hypotension    Low blood pressure    Peripheral arterial disease (CMS/HCC)    Dyspnea    Non-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 foot with fat layer exposed (CMS/HCC)    Asteatosis cutis    Pancytopenia (CMS/HCC)    BMI 29.0-29.9,adult    Class 1 obesity due to excess calories with body mass index (BMI) of 31.0 to 31.9 in adult    Primary open angle glaucoma (POAG) of both eyes, moderate stage    Ulcer of left foot (CMS/HCC)    Acute respiratory failure (CMS/HCC)     Assessment/Plan   Hypoxia  Covid 19  Hypertension  Diastolic heart failure    Oxygen near baseline. 3lpm at home  Now on 4lpm    Continue IBD/ICS  Insentive spirometry/pulmonary hygiene  IV vancomycin, Zosyn per Infectious Disease  Remdesivir-completed  Oral dexamethasone for 10 days total  Eliquis   GI prophylaxis  Podiatry following  PT/OT      Maynor Zimmerman MD  [facility]

## 2023-10-23 NOTE — PROCEDURES
Vascular Access Team Procedure Note     Visit Date: 10/23/2023      Patient Name: Samira López         MRN: [id number]             Procedure: Pt has a dual lumen picc line to R upper arm, drsg dry and intact (dated 10/16), due for drsg change today. Drsg has been changed using sterile technique, site without any redness, swelling or drainage, external length is 3cm as documented on insertion, both blue caps have been changed on lumens, brisk blood return noted to both lumens and they both flush easily with NS, curos caps applied.                            Muna Hernandez RN  10/23/2023  5:08 PM

## 2023-10-24 LAB
ANION GAP SERPL CALC-SCNC: 10 MMOL/L
BUN SERPL-MCNC: 19 MG/DL (ref 8–25)
CALCIUM SERPL-MCNC: 8.6 MG/DL (ref 8.5–10.4)
CHLORIDE SERPL-SCNC: 104 MMOL/L (ref 97–107)
CO2 SERPL-SCNC: 21 MMOL/L (ref 24–31)
CREAT SERPL-MCNC: 0.7 MG/DL (ref 0.4–1.6)
ERYTHROCYTE [DISTWIDTH] IN BLOOD BY AUTOMATED COUNT: 17.3 % (ref 11.5–14.5)
GFR SERPL CREATININE-BSD FRML MDRD: 86 ML/MIN/1.73M*2
GLUCOSE BLD MANUAL STRIP-MCNC: 146 MG/DL (ref 74–99)
GLUCOSE BLD MANUAL STRIP-MCNC: 158 MG/DL (ref 74–99)
GLUCOSE BLD MANUAL STRIP-MCNC: 164 MG/DL (ref 74–99)
GLUCOSE BLD MANUAL STRIP-MCNC: 265 MG/DL (ref 74–99)
GLUCOSE SERPL-MCNC: 171 MG/DL (ref 65–99)
HCT VFR BLD AUTO: 33.4 % (ref 36–46)
HGB BLD-MCNC: 10.5 G/DL (ref 12–16)
MCH RBC QN AUTO: 30 PG (ref 26–34)
MCHC RBC AUTO-ENTMCNC: 31.4 G/DL (ref 32–36)
MCV RBC AUTO: 95 FL (ref 80–100)
NRBC BLD-RTO: 0 /100 WBCS (ref 0–0)
PLATELET # BLD AUTO: 243 X10*3/UL (ref 150–450)
PMV BLD AUTO: 11 FL (ref 7.5–11.5)
POTASSIUM SERPL-SCNC: 4.1 MMOL/L (ref 3.4–5.1)
RBC # BLD AUTO: 3.5 X10*6/UL (ref 4–5.2)
SODIUM SERPL-SCNC: 135 MMOL/L (ref 133–145)
WBC # BLD AUTO: 14.6 X10*3/UL (ref 4.4–11.3)

## 2023-10-24 PROCEDURE — 85027 COMPLETE CBC AUTOMATED: CPT | Performed by: NURSE PRACTITIONER

## 2023-10-24 PROCEDURE — 97535 SELF CARE MNGMENT TRAINING: CPT | Mod: GO,CO

## 2023-10-24 PROCEDURE — 2500000004 HC RX 250 GENERAL PHARMACY W/ HCPCS (ALT 636 FOR OP/ED): Performed by: INTERNAL MEDICINE

## 2023-10-24 PROCEDURE — 82947 ASSAY GLUCOSE BLOOD QUANT: CPT

## 2023-10-24 PROCEDURE — 94640 AIRWAY INHALATION TREATMENT: CPT

## 2023-10-24 PROCEDURE — 2500000001 HC RX 250 WO HCPCS SELF ADMINISTERED DRUGS (ALT 637 FOR MEDICARE OP): Performed by: INTERNAL MEDICINE

## 2023-10-24 PROCEDURE — 2500000002 HC RX 250 W HCPCS SELF ADMINISTERED DRUGS (ALT 637 FOR MEDICARE OP, ALT 636 FOR OP/ED): Performed by: INTERNAL MEDICINE

## 2023-10-24 PROCEDURE — 97116 GAIT TRAINING THERAPY: CPT | Mod: GP

## 2023-10-24 PROCEDURE — 80048 BASIC METABOLIC PNL TOTAL CA: CPT | Performed by: NURSE PRACTITIONER

## 2023-10-24 PROCEDURE — 97530 THERAPEUTIC ACTIVITIES: CPT | Mod: GP

## 2023-10-24 PROCEDURE — 9420000001 HC RT PATIENT EDUCATION 5 MIN

## 2023-10-24 PROCEDURE — 1200000002 HC GENERAL ROOM WITH TELEMETRY DAILY

## 2023-10-24 PROCEDURE — 97530 THERAPEUTIC ACTIVITIES: CPT | Mod: GO,CO

## 2023-10-24 RX ORDER — VANCOMYCIN 1.5 G/300ML
1.5 INJECTION, SOLUTION INTRAVENOUS EVERY 24 HOURS
Qty: 1500 ML | Refills: 0
Start: 2023-10-25 | End: 2023-10-30

## 2023-10-24 RX ADMIN — OXYBUTYNIN CHLORIDE 5 MG: 5 TABLET ORAL at 10:04

## 2023-10-24 RX ADMIN — VANCOMYCIN 1500 MG: 1.5 INJECTION, SOLUTION INTRAVENOUS at 05:33

## 2023-10-24 RX ADMIN — Medication 324 MG: at 10:07

## 2023-10-24 RX ADMIN — KETOROLAC TROMETHAMINE 1 DROP: 0.5 SOLUTION OPHTHALMIC at 21:16

## 2023-10-24 RX ADMIN — Medication 324 MG: at 10:04

## 2023-10-24 RX ADMIN — EXEMESTANE 25 MG: 25 TABLET ORAL at 10:04

## 2023-10-24 RX ADMIN — APIXABAN 2.5 MG: 2.5 TABLET, FILM COATED ORAL at 21:16

## 2023-10-24 RX ADMIN — KETOROLAC TROMETHAMINE 1 DROP: 0.5 SOLUTION OPHTHALMIC at 12:47

## 2023-10-24 RX ADMIN — Medication 2000 UNITS: at 10:05

## 2023-10-24 RX ADMIN — INSULIN LISPRO 3 UNITS: 100 INJECTION, SOLUTION INTRAVENOUS; SUBCUTANEOUS at 12:46

## 2023-10-24 RX ADMIN — KETOROLAC TROMETHAMINE 1 DROP: 0.5 SOLUTION OPHTHALMIC at 06:10

## 2023-10-24 RX ADMIN — APIXABAN 2.5 MG: 2.5 TABLET, FILM COATED ORAL at 10:07

## 2023-10-24 RX ADMIN — INSULIN LISPRO 3 UNITS: 100 INJECTION, SOLUTION INTRAVENOUS; SUBCUTANEOUS at 17:47

## 2023-10-24 RX ADMIN — ROSUVASTATIN CALCIUM 20 MG: 20 TABLET, COATED ORAL at 10:07

## 2023-10-24 RX ADMIN — OXYCODONE HYDROCHLORIDE 5 MG: 5 TABLET ORAL at 05:53

## 2023-10-24 RX ADMIN — OXYCODONE HYDROCHLORIDE 5 MG: 5 TABLET ORAL at 21:54

## 2023-10-24 RX ADMIN — KETOROLAC TROMETHAMINE 1 DROP: 0.5 SOLUTION OPHTHALMIC at 17:46

## 2023-10-24 RX ADMIN — DEXAMETHASONE 6 MG: 6 TABLET ORAL at 10:05

## 2023-10-24 RX ADMIN — PIPERACILLIN SODIUM AND TAZOBACTAM SODIUM 4.5 G: 4; .5 INJECTION, SOLUTION INTRAVENOUS at 02:04

## 2023-10-24 RX ADMIN — PANTOPRAZOLE SODIUM 40 MG: 40 TABLET, DELAYED RELEASE ORAL at 05:33

## 2023-10-24 RX ADMIN — ASPIRIN 81 MG CHEWABLE TABLET 81 MG: 81 TABLET CHEWABLE at 10:05

## 2023-10-24 RX ADMIN — OXYCODONE HYDROCHLORIDE 5 MG: 5 TABLET ORAL at 10:04

## 2023-10-24 RX ADMIN — DEXAMETHASONE 6 MG: 6 TABLET ORAL at 17:46

## 2023-10-24 RX ADMIN — PIPERACILLIN SODIUM AND TAZOBACTAM SODIUM 4.5 G: 4; .5 INJECTION, SOLUTION INTRAVENOUS at 10:06

## 2023-10-24 RX ADMIN — LATANOPROST 1 DROP: 50 SOLUTION OPHTHALMIC at 21:15

## 2023-10-24 RX ADMIN — DORZOLAMIDE HYDROCHLORIDE 1 DROP: 20 SOLUTION/ DROPS OPHTHALMIC at 21:16

## 2023-10-24 RX ADMIN — Medication 324 MG: at 17:46

## 2023-10-24 RX ADMIN — OXYBUTYNIN CHLORIDE 5 MG: 5 TABLET ORAL at 21:16

## 2023-10-24 RX ADMIN — IPRATROPIUM BROMIDE AND ALBUTEROL SULFATE 3 ML: 2.5; .5 SOLUTION RESPIRATORY (INHALATION) at 12:15

## 2023-10-24 RX ADMIN — OXYCODONE HYDROCHLORIDE AND ACETAMINOPHEN 500 MG: 500 TABLET ORAL at 10:04

## 2023-10-24 RX ADMIN — DORZOLAMIDE HYDROCHLORIDE 1 DROP: 20 SOLUTION/ DROPS OPHTHALMIC at 10:04

## 2023-10-24 RX ADMIN — IPRATROPIUM BROMIDE AND ALBUTEROL SULFATE 3 ML: 2.5; .5 SOLUTION RESPIRATORY (INHALATION) at 08:01

## 2023-10-24 RX ADMIN — PIPERACILLIN SODIUM AND TAZOBACTAM SODIUM 4.5 G: 4; .5 INJECTION, SOLUTION INTRAVENOUS at 17:46

## 2023-10-24 ASSESSMENT — COGNITIVE AND FUNCTIONAL STATUS - GENERAL
WALKING IN HOSPITAL ROOM: A LITTLE
HELP NEEDED FOR BATHING: A LOT
TURNING FROM BACK TO SIDE WHILE IN FLAT BAD: A LITTLE
DAILY ACTIVITIY SCORE: 16
MOVING TO AND FROM BED TO CHAIR: A LITTLE
DRESSING REGULAR UPPER BODY CLOTHING: A LITTLE
PERSONAL GROOMING: A LITTLE
WALKING IN HOSPITAL ROOM: A LOT
DAILY ACTIVITIY SCORE: 16
DRESSING REGULAR UPPER BODY CLOTHING: A LITTLE
MOBILITY SCORE: 15
DRESSING REGULAR UPPER BODY CLOTHING: A LITTLE
DRESSING REGULAR LOWER BODY CLOTHING: A LOT
MOVING TO AND FROM BED TO CHAIR: A LITTLE
MOVING FROM LYING ON BACK TO SITTING ON SIDE OF FLAT BED WITH BEDRAILS: A LITTLE
STANDING UP FROM CHAIR USING ARMS: A LITTLE
TURNING FROM BACK TO SIDE WHILE IN FLAT BAD: A LITTLE
CLIMB 3 TO 5 STEPS WITH RAILING: TOTAL
DRESSING REGULAR LOWER BODY CLOTHING: A LITTLE
HELP NEEDED FOR BATHING: A LOT
MOVING TO AND FROM BED TO CHAIR: A LITTLE
MOVING FROM LYING ON BACK TO SITTING ON SIDE OF FLAT BED WITH BEDRAILS: A LITTLE
TOILETING: A LOT
CLIMB 3 TO 5 STEPS WITH RAILING: TOTAL
MOBILITY SCORE: 17
TOILETING: A LOT
PERSONAL GROOMING: A LITTLE
PERSONAL GROOMING: A LITTLE
WALKING IN HOSPITAL ROOM: A LOT
STANDING UP FROM CHAIR USING ARMS: A LITTLE
CLIMB 3 TO 5 STEPS WITH RAILING: A LOT
TURNING FROM BACK TO SIDE WHILE IN FLAT BAD: A LITTLE
DRESSING REGULAR LOWER BODY CLOTHING: A LOT
TOILETING: A LOT
DAILY ACTIVITIY SCORE: 18
MOVING FROM LYING ON BACK TO SITTING ON SIDE OF FLAT BED WITH BEDRAILS: A LITTLE
MOBILITY SCORE: 15
STANDING UP FROM CHAIR USING ARMS: A LITTLE
HELP NEEDED FOR BATHING: A LITTLE

## 2023-10-24 ASSESSMENT — PAIN SCALES - GENERAL
PAINLEVEL_OUTOF10: 0 - NO PAIN
PAINLEVEL_OUTOF10: 8
PAINLEVEL_OUTOF10: 4
PAINLEVEL_OUTOF10: 7
PAINLEVEL_OUTOF10: 0 - NO PAIN
PAINLEVEL_OUTOF10: 7

## 2023-10-24 ASSESSMENT — PAIN - FUNCTIONAL ASSESSMENT: PAIN_FUNCTIONAL_ASSESSMENT: 0-10

## 2023-10-24 NOTE — PROGRESS NOTES
Samira López is a 83 y.o. female on day 9 of admission presenting with Acute respiratory failure (CMS/HCC).    Subjective   Patient seen and examined.  Resting in bed in no acute distress.  Awake alert oriented x 3.  No new complaints.  No shortness of breath, cough. No fevers chills.  Complaints of left foot pain on the top of the foot and in the back of the heel.  PRAFO boots on.  Denies new concerns otherwise    Physical Exam    Objective     REVIEW OF SYSTEMS  Objective: Patient seen bedside.  Patient is alert aware and oriented x3.  She does not appear to be in acute distress.  Breathing comfortably on 2 L nasal cannula.      Vasc: DP and PT pulses are faintly palpable. Skin is atrophic to the dorsum left foot. Hair growth absent.      Neuro:  sensation intact to left foot to light touch. Muscle strength +5/5.      Derm: Left foot status post transmetatarsal amputation.  The incision appears well healed. There is no signs of ischemia or incisional dehiscence.       She does have several superficial venous stasis ulcerations to the dorsum foot and anterior ankle these all appear with mixed fibrogranular base without significant depth.  There is findings consistent with chronic venous stasis dermatitis.       There is a posterior heel decubitus ulcer measuring roughly 1.7 x 2.5.  This continues to demarcate. There is a clear deep tissue injury noted here.  This appears stable. There is a mixed fibrogranular base.  No active purulent drainage.  Mild serosanginuous drainage. Overall there are no significant signs of bacterial infection involving the left lower extremity.  Improved erythema to the left lower extretmiy.      Right foot and leg without open ulceration or break in the skin .     Ortho: Significant tenderness on palpation to the multiple ulcerations to the left lower extremity.  Status post transmetatarsal amputation.     Last Recorded Vitals  Blood pressure (!) 167/48, pulse 71, temperature 36.9 °C  "(98.4 °F), temperature source Temporal, resp. rate 16, height 1.791 m (5' 10.5\"), weight 85.9 kg (189 lb 6 oz), SpO2 96 %.    Intake/Output last 3 Shifts:  I/O last 3 completed shifts:  In: 840 (9.8 mL/kg) [P.O.:340; IV Piggyback:500]  Out: - (0 mL/kg)   Weight: 85.9 kg     Relevant Results           Assessment/Plan   Principal Problem:    Acute respiratory failure (CMS/HCC)  Active Problems:    Arthritis, multiple joint involvement    Balance disorder    Lumbar canal stenosis    Chronic diastolic congestive heart failure (CMS/[facility])    Dermatitis    Obstructive sleep apnea, adult    Peripheral polyneuropathy    Anemia due to chronic kidney disease    Cellulitis and abscess of lower extremity    Cellulitis of left lower limb    Cutaneous abscess of left lower limb    Malignant neoplasm of unspecified site of unspecified female breast (CMS/[facility])    NATALIYA (obstructive sleep apnea)    Personal history of nicotine dependence    Primary generalized (osteo)arthritis    Abnormal computed tomography scan    Accidental fall    Anxiety    Asthenia    Chronic respiratory failure with hypoxia (CMS/[facility])    Diabetes mellitus type 2 in obese (CMS/[facility])    Gangrene of foot (CMS/[facility])    Dyspnea    She has a large anterior ankle and dorsum left foot venous ulcer which appear improved today. Posterior heel decubitus ulceration also appears to be demarcating.  Wound cultures growing MRSA and Pseudomonas.  Infectious disease placed finalized antibiotic recommendations with plans for Zosyn 2 weeks and vancomycin for 1 week following discharge.       This was dressed today with betadine to the ulcer base, calcium alginate to the base of the wound ABDs. The anterior ankle and dorsum left foot were dressed with calcium alginate and a well-padded with ABD pads and lightly wrapped Kerlix.  Will hold off on compression today secondary to pressure injuries. PRAFO boots at all times while in bed to bilateral lower extremities.  Okay for full " weightbearing utilizing postop shoe to the left foot.     I will place discharge instructions for daily dressing changes and outpatient follow-up with me.     Anders Loera DPM

## 2023-10-24 NOTE — PROGRESS NOTES
Physical Therapy    Physical Therapy Treatment    Patient Name: Samira López  MRN: [id number]  Today's Date: 10/24/2023  Time Calculation  Start Time: 0830  Stop Time: 0855  Time Calculation (min): 25 min       Assessment/Plan   PT Assessment  PT Assessment Results: Decreased strength, Decreased endurance, Impaired balance, Decreased mobility, Impaired hearing  Rehab Prognosis: Good  Evaluation/Treatment Tolerance: Patient tolerated treatment well, Patient limited by fatigue  Medical Staff Made Aware: Yes  Strengths: Ability to acquire knowledge, Access to adaptive/assistive products, Attitude of self  Barriers to Participation: Comorbidities, Coping skills  End of Session Communication: Bedside nurse  Assessment Comment: The patient continues to require Nikita for transfers and ambulation with RW. VC for safe sequencing of mobility and safety awareness.  End of Session Patient Position: Bed, 3 rail up  PT Plan  Inpatient/Swing Bed or Outpatient: Inpatient  PT Plan  Treatment/Interventions: Bed mobility, Gait training, Transfer training, Balance training, Neuromuscular re-education, Strengthening, Endurance training, Range of motion, Therapeutic activity, Therapeutic exercise, Home exercise program, Orthotic fitting/training  PT Plan: Skilled PT  PT Frequency: 4 times per week  PT Discharge Recommendations: Moderate intensity level of continued care  Equipment Recommended upon Discharge: Wheeled walker  PT Recommended Transfer Status: Assist x1  PT - OK to Discharge: Yes      General Visit Information:   PT  Visit  PT Received On: 10/24/23  Response to Previous Treatment: Patient reporting fatigue but able to participate.  General  Prior to Session Communication: Bedside nurse  Patient Position Received: Bed, 3 rail up  Preferred Learning Style: auditory  General Comment: The patient was cleared for therapy follow-up by nsg. Pt presented supine in bed and agreeable to session with verbal encouragement.    Subjective    Precautions:  Precautions  Hearing/Visual Limitations: wears glasses  LE Weight Bearing Status: Heel Weight Bearing in Post-Op Shoe  Medical Precautions: Fall precautions  Post-Surgical Precautions:  (Heel WB'ing with use of surgical shoe LLE)  Precautions Comment: Heel weight bearing with surgical shoe on left.    Objective   Pain:  Pain Assessment  Pain Assessment: 0-10  Pain Score: 4  Pain Type: Acute pain  Pain Location: Foot  Pain Orientation: Left    Extremity/Trunk Assessments:  RLE   RLE : Within Functional Limits  LLE   LLE : Exceptions to WFL (S/p TMA with cast and gauze in place. Has a surgical shoe for offloading. Heel WB.)  Strength LLE  LLE Overall Strength: Greater than or equal to 3/5 as evidenced by functional mobility, Deficits  Activity Tolerance:  Activity Tolerance  Endurance: Decreased tolerance for upright activites  Treatments:  Therapeutic Activity  Therapeutic Activity Performed: Yes  Therapeutic Activity 1: Education on importance of maintaining WB'ing restriction to LLE with surgical shoe for healing of wound.    Bed Mobility  Bed Mobility: Yes  Bed Mobility 1  Bed Mobility 1: Supine to sitting  Level of Assistance 1: Close supervision  Bed Mobility Comments 1: VC for safe sequencing  Bed Mobility 2  Bed Mobility  2: Sitting to supine  Level of Assistance 2: Minimum assistance  Bed Mobility Comments 2: Nikita for bringing BLE into supine; use of bed rails.    Ambulation/Gait Training  Ambulation/Gait Training Performed: Yes  Ambulation/Gait Training 1  Surface 1: Level tile  Device 1: Rolling walker  Assistance 1: Minimum assistance  Quality of Gait 1: Narrow base of support  Comments/Distance (ft) 1: Ambulation of 17ftx1 with RW and Nikita for balance. Narrow SHEMAR with step-to gait pattern. VC for safe use of RW and for maintaining WB'ing restriction.  Transfers  Transfer: Yes  Transfer 1  Transfer From 1: Sit to  Transfer to 1: Stand  Technique 1: Sit to stand  Transfer Device 1:  Walker  Transfer Level of Assistance 1: Minimum assistance  Trials/Comments 1: VC for safe sequencing  Transfers 2  Transfer From 2: Stand to  Transfer to 2: Sit  Technique 2: Stand to sit  Transfer Device 2: Walker  Transfer Level of Assistance 2: Minimum assistance    Outcome Measures:  [facility] Basic Mobility  Turning from your back to your side while in a flat bed without using bedrails: A little  Moving from lying on your back to sitting on the side of a flat bed without using bedrails: A little  Moving to and from bed to chair (including a wheelchair): A little  Standing up from a chair using your arms (e.g. wheelchair or bedside chair): A little  To walk in hospital room: A little  Climbing 3-5 steps with railing: A lot  Basic Mobility - Total Score: 17    Education Documentation  No documentation found.  Education Comments  No comments found.        OP EDUCATION:  Education  Individual(s) Educated: Patient  Education Provided: Fall Risk, Post-Op Precautions  Risk and Benefits Discussed with Patient/Caregiver/Other: yes  Patient/Caregiver Demonstrated Understanding: yes  Plan of Care Discussed and Agreed Upon: yes  Patient Response to Education: Patient/Caregiver Verbalized Understanding of Information    Encounter Problems       Encounter Problems (Active)       Mobility       STG - Patient will ambulate >/= 50 ft with FWW at mod I level. (Progressing)       Start:  10/16/23    Expected End:  10/31/23            STG - Patient will ambulate up and down a curb/step with FWW and CGA x1. (Progressing)       Start:  10/16/23    Expected End:  10/31/23               Transfers       STG - Patient to transfer to and from sit to supine at flat bed at mod I level. (Progressing)       Start:  10/16/23    Expected End:  10/31/23            STG - Patient will perform bed mobility at mod I level. (Progressing)       Start:  10/16/23    Expected End:  10/31/23            STG - Patient will transfer sit to and from stand with  FWW at mod I level. (Progressing)       Start:  10/16/23    Expected End:  10/31/23

## 2023-10-24 NOTE — PROGRESS NOTES
Vancomycin Dosing by Pharmacy- FOLLOW UP    Samira López is a 83 y.o. year old female who Pharmacy has been consulted for vancomycin dosing for cellulitis, skin and soft tissue. Based on the patient's indication and renal status this patient is being dosed based on a goal AUC of 400-600.     Renal function is currently stable.    Current vancomycin dose: 1500 mg given every 24 hours    Estimated vancomycin AUC on current dose: 550 mg/L.hr     Visit Vitals  BP (!) 167/48 (BP Location: Left arm, Patient Position: Lying)   Pulse 71   Temp 36.9 °C (98.4 °F) (Temporal)   Resp 16        Lab Results   Component Value Date    CREATININE 0.70 10/24/2023    CREATININE 0.70 10/23/2023    CREATININE 0.60 10/21/2023    CREATININE 0.60 10/20/2023        Patient weight is 85.9 kg    I/O last 3 completed shifts:  In: 840 (9.8 mL/kg) [P.O.:340; IV Piggyback:500]  Out: - (0 mL/kg)   Weight: 85.9 kg       Lab Results   Component Value Date    PATIENTTEMP 37.0 05/13/2023        Assessment/Plan    Within goal AUC range. Continue current vancomycin regimen.    This dosing regimen is predicted by [employer] to result in the following pharmacokinetic parameters:  Loading dose: N/A  Regimen: 1500 mg IV every 24 hours.  Start time: 05:33 on 10/25/2023  Exposure target: AUC24 (range)400-600 mg/L.hr   AUC24,ss: 550 mg/L.hr  Probability of AUC24 > 400: 99 %  Ctrough,ss: 16.4 mg/L  Probability of Ctrough,ss > 20: 10 %  Probability of nephrotoxicity (Lodise REUBEN 2009): 12 %      The next level will be obtained in 5-7 days. May be obtained sooner if clinically indicated.   Will continue to monitor renal function daily while on vancomycin and order serum creatinine at least every 48 hours if not already ordered.  Follow for continued vancomycin needs, clinical response, and signs/symptoms of toxicity.       Abhinav Emmanuel, [facility]

## 2023-10-24 NOTE — PROGRESS NOTES
Precert has been obtained. Will need final dc order and [facility] provider certification. MSW messaged Dr. Ríos in [place] to update him. Pt requires a 7000 PRIOR to admit to snf that cannot be completed without the goldenrod.      Do not send pt without speaking with Care coordination.     Annalee Hinton [facility], LSW

## 2023-10-24 NOTE — PROGRESS NOTES
Occupational Therapy    OT Treatment    Patient Name: Samira López  MRN: [id number]  Today's Date: 10/24/2023  Time Calculation  Start Time: 1050  Stop Time: 1130  Time Calculation (min): 40 min         Assessment:  OT Assessment: Pt demo good safety awareness utilizing 2WW during functional transfers  End of Session Communication: Bedside nurse  End of Session Patient Position: Up in chair  Strengths: Ability to acquire knowledge, Attitude of self  Barriers to Participation: Comorbidities  Plan:  Treatment Interventions: ADL retraining, Functional transfer training  Treatment Interventions: ADL retraining, Functional transfer training    Subjective   General:  OT Received On: 10/24/23  Prior to Session Communication: Bedside nurse  Patient Position Received: Bed, 3 rail up  General Comment: Pt cleared by nursing for therapy. Pt supine in bed upon arrival and agreeable for treatment.  Vital Signs:  Vital Signs  Heart Rate: 66  Heart Rate Source: Monitor  SpO2: 97 %  Patient Position: Lying  Pain:  Pain Assessment  Pain Assessment: 0-10  Pain Score: 0 - No pain    Objective    Activities of Daily Living: UE Bathing  UE Bathing Level of Assistance: Setup  UE Bathing Where Assessed: Other (Comment) (up in chair)  UE Bathing Comments: to wash face, hands and arms    LE Bathing  LE Bathing Comments: attempted and pt declined    UE Dressing  UE Dressing Level of Assistance: Moderate assistance  UE Dressing Where Assessed: Chair  UE Dressing Comments: to doff/don two gowns secondary to the pt being cold and requiring assistance for lines    LE Dressing  LE Dressing: Yes  Shoe Level of Assistance: Dependent  Orthotics Level of Assistance: Maximum assistance  LE Dressing Where Assessed: Edge of bed  LE Dressing Comments: to don R shoe and L surgical shoe. Pt declined AE    Toileting  Toileting Comments: Pt reports not having to use bathroom at this time  Functional Standing Tolerance:     Bed Mobility/Transfers: Bed  Mobility  Bed Mobility: Yes  Bed Mobility 1  Bed Mobility 1: Supine to sitting  Level of Assistance 1: Close supervision  Bed Mobility Comments 1: 1 verbal cue for hand placement    Transfers  Transfer: Yes  Transfer 1  Transfer From 1: Bed to  Transfer to 1: Chair with arms  Transfer Device 1: Walker  Transfer Level of Assistance 1: Minimum assistance  Trials/Comments 1: Min verbal cues for proper hand/foot placement and proper 2WW placement       Therapy/Activity: Therapeutic Activity  Therapeutic Activity Performed: Yes  Therapeutic Activity 1: Pt educated on the importance of sitting up in chair throughout the day to prevent deconditioning and re-educated on the importance of surgical shoe to maintain WB status      Outcome Measures:[facility] Daily Activity  Putting on and taking off regular lower body clothing: A lot  Bathing (including washing, rinsing, drying): A lot  Putting on and taking off regular upper body clothing: A little  Toileting, which includes using toilet, bedpan or urinal: A lot  Taking care of personal grooming such as brushing teeth: A little  Eating Meals: None  Daily Activity - Total Score: 16        Education Documentation  No documentation found.  Education Comments  No comments found.        OP EDUCATION:       Goals:    Problem: Bathing  Goal: STG - Patient will bathe body UB/LB independent with AE as needed  Outcome: Progressing     Problem: Dressings Lower Extremities  Goal: LTG - Patient will utilize adaptive techniques/equipment to dress lower body independently  Outcome: Progressing     Problem: Mobility  Goal: Goal 1Patient will perform functional mobility to and from the bathroom with close supervision and 2ww  Outcome: Progressing     Problem: Toileting  Goal: LTG - Patient will demonstrate use of appropriate intervention for safe toileting independent with 2ww  Outcome: Progressing     Problem: Transfers  Goal: LTG - Patient will demonstrate safe transfer techniques independent  with 2ww  Outcome: Progressing

## 2023-10-24 NOTE — PROGRESS NOTES
Samira López is a 83 y.o. female on day 9 of admission presenting with Acute respiratory failure (CMS/HCC).    Subjective   Interval History:   Afebrile, no chills  Denies SOB, cough, chest pain  No nausea vomiting or diarrhea      Reports mild pain to her left foot  Dressing was changed today by podiatry    Objective   Range of Vitals (last 24 hours)  Heart Rate:  [59-73]   Temp:  [35.9 °C (96.6 °F)-36.9 °C (98.4 °F)]   Resp:  [16-21]   BP: ()/(48-52)   Weight:  [85.9 kg (189 lb 6 oz)]   SpO2:  [96 %-97 %]   Daily Weight  10/24/23 : 85.9 kg (189 lb 6 oz)    Body mass index is 26.79 kg/m².    Physical Exam  Constitutional:       Appearance: Normal appearance.   HENT:      Head: Normocephalic and atraumatic.      Nose: Nose normal.   Eyes:      Extraocular Movements: Extraocular movements intact.      Conjunctiva/sclera: Conjunctivae normal.   Cardiovascular:      Rate and Rhythm: Normal rate and regular rhythm.      Pulses: Normal pulses.      Heart sounds: Normal heart sounds.   Pulmonary:      Breath sounds: Examination of the right-upper field reveals decreased breath sounds. Examination of the left-upper field reveals decreased breath sounds. Examination of the right-middle field reveals decreased breath sounds. Examination of the left-middle field reveals decreased breath sounds. Examination of the right-lower field reveals decreased breath sounds. Examination of the left-lower field reveals decreased breath sounds. Decreased breath sounds present.   Abdominal:      General: Bowel sounds are normal.      Palpations: Abdomen is soft.   Musculoskeletal:      Cervical back: Normal range of motion and neck supple.      Left lower leg: Edema present.   Skin:     Comments: Left leg and foot dressing   Neurological:      Mental Status: She is alert and oriented to person, place, and time. Mental status is at baseline.   Psychiatric:         Mood and Affect: Mood normal.         Behavior: Behavior normal.      Antibiotics  piperacillin-tazobactam-dextrose (Zosyn) IV 3.375 g  vancomycin-diluent combo no.1 (Xellia) IVPB 1,250 mg  acetaminophen (Tylenol) tablet 650 mg  furosemide (Lasix) injection 40 mg  dexAMETHasone (Decadron) injection 10 mg  amLODIPine (Norvasc) tablet 5 mg  apixaban (Eliquis) tablet 2.5 mg  clopidogrel (Plavix) tablet 75 mg  ketorolac (Acular) 0.5 % ophthalmic solution 1 drop  metoprolol succinate XL (Toprol-XL) 24 hr tablet 50 mg  pantoprazole (ProtoNix) EC tablet 40 mg  rosuvastatin (Crestor) tablet 20 mg  aspirin chewable tablet 81 mg  calcium carbonate (Tums) chewable tablet 500 mg  ferrous gluconate (Fergon) 324 (38 Fe) mg tablet 324 mg  omega 3-dha-epa-fish oil 1,200 (144-216) mg capsule 3,600 mg  furosemide (Lasix) tablet 40 mg  oxygen (O2) therapy  cholecalciferol (Vitamin D-3) capsule 50 mcg  biotin tablet 1,000 mcg  clobetasol (Temovate) 0.05 % ointment 1 Application  lisinopril tablet 20 mg  palbociclib (Ibrance) tablet 100 mg  sodium hypochlorite (Dakin's HALF-Strength) 0.25 % external solution  palbociclib (Ibrance) tablet 100 mg  acetaminophen (Tylenol) tablet 325 mg  ascorbic acid (Vitamin C) tablet 500 mg  diphenhydrAMINE (Sominex) tablet 25 mg  exemestane (Aromasin) tablet 25 mg  latanoprost (Xalatan) 0.005 % ophthalmic solution 1 drop  dorzolamide (Trusopt) 2 % ophthalmic solution 1 drop  cholecalciferol (Vitamin D-3) tablet 125 mcg  pantoprazole (ProtoNix) EC tablet 40 mg  oxybutynin (Ditropan) tablet 5 mg  palbociclib (Ibrance) capsule 125 mg  tiZANidine (Zanaflex) tablet 2 mg  piperacillin-tazobactam-dextrose (Zosyn) IV 4.5 g  ipratropium-albuteroL (Duo-Neb) 0.5-2.5 mg/3 mL nebulizer solution 3 mL  dexAMETHasone (Decadron) tablet 6 mg  remdesivir (Veklury) 200 mg in sodium chloride 0.9% 250 mL IV  remdesivir (Veklury) 100 mg in sodium chloride 0.9% 250 mL IV  vancomycin-diluent combo no.1 (Xellia) IVPB 750 mg  acetaminophen (Tylenol) tablet 650 mg  acetaminophen (Tylenol) oral  liquid 650 mg  acetaminophen (Tylenol) suppository 650 mg  polyethylene glycol (Glycolax, Miralax) packet 17 g  benzocaine-menthol (Cepastat Sore Throat) 15-3.6 mg lozenge 1 lozenge  dextromethorphan-guaifenesin (Robitussin DM)  mg/5 mL oral liquid 5 mL  guaiFENesin (Mucinex) 12 hr tablet 600 mg  dextrose 50 % injection 25 g  glucagon (Glucagen) injection 1 mg  dextrose 10 % in water (D10W) infusion  insulin lispro (HumaLOG) injection 0-15 Units  piperacillin-tazobactam-dextrose (Zosyn) IV 4.5 g  ipratropium-albuteroL (Duo-Neb) 0.5-2.5 mg/3 mL nebulizer solution 3 mL  lidocaine (Xylocaine) 10 mg/mL (1 %) injection 10 mg  alteplase (Cathflo Activase) injection 2 mg      oxyCODONE (Roxicodone) immediate release tablet  polyethylene glycol (Glycolax, Miralax) packet  oxyCODONE (Roxicodone) immediate release tablet 5 mg  iron sucrose (Venofer) 200 mg in sodium chloride 0.9% 100 mL IVPB  iron sucrose (Venofer) injection 200 mg  oxygen (O2) therapy  vancomycin-diluent combo no.1 (Xellia) IVPB 1 g  polyethylene glycol (Glycolax, Miralax) packet 17 g  tiZANidine (Zanaflex) tablet 2 mg  dilTIAZem (Cardizem) 125 mg in dextrose 5% 125 mL (1 mg/mL) infusion (premix)  dilTIAZem (Cardizem) injection 15 mg  vancomycin-diluent combo no.1 (Xellia) IVPB 1,250 mg  cholecalciferol (Vitamin D-3) tablet 2,000 Units      vancomycin-diluent combo no.1 (Xellia) IVPB 1,500 mg  doxepin (SINEquan) capsule 10 mg  gabapentin (Neurontin) capsule 100 mg      Relevant Results  Labs  Results from last 72 hours   Lab Units 10/24/23  0534 10/23/23  0500   WBC AUTO x10*3/uL 14.6* 13.5*   HEMOGLOBIN g/dL 10.5* 10.4*   HEMATOCRIT % 33.4* 30.6*   PLATELETS AUTO x10*3/uL 243 254   NEUTROS PCT AUTO %  --  83.4   LYMPHS PCT AUTO %  --  7.4   MONOS PCT AUTO %  --  4.8   EOS PCT AUTO %  --  0.0       Results from last 72 hours   Lab Units 10/24/23  0534 10/23/23  0500   SODIUM mmol/L 135 134   POTASSIUM mmol/L 4.1 4.0   CHLORIDE mmol/L 104 102   CO2  mmol/L 21* 22*   BUN mg/dL 19 19   CREATININE mg/dL 0.70 0.70   GLUCOSE mg/dL 171* 291*   CALCIUM mg/dL 8.6 8.6   ANION GAP mmol/L 10 10   EGFR mL/min/1.73m*2 86 86           Estimated Creatinine Clearance: 74.5 mL/min (by C-G formula based on SCr of 0.7 mg/dL).  C-Reactive Protein   Date Value Ref Range Status   10/16/2023 5.90 (H) 0.00 - 2.00 mg/dL Final     CRP   Date Value Ref Range Status   09/09/2023 6.3 (H) 0 - 2.0 MG/DL Final     Comment:     Performed at [address] [address]   04/24/2020 0.18 mg/dL Final     Comment:     REF VALUE  < 1.00       Microbiology  Susceptibility data from last 14 days.  Collected Specimen Info Organism Aztreonam Cefepime Ceftazidime Ciprofloxacin Clindamycin Erythromycin Gentamicin Levofloxacin Oxacillin Piperacillin/Tazobactam Tetracycline Tobramycin Trimethoprim/Sulfamethoxazole Vancomycin   10/16/23 Tissue/Biopsy from Diabetic Foot Ulcer Methicillin Resistant Staphylococcus aureus (MRSA)     S R   R  R  R S     Pseudomonas aeruginosa S S S S   S S  S  S     10/15/23 Tissue/Biopsy from Wound/Tissue Pseudomonas aeruginosa S S S S   S S  S  S       Mixed Gram-Positive and Gram-Negative Bacteria                     Imaging  Reviewed  Assessment/Plan   Acute on chronic respiratory failure  Coronavirus infection  Marginal leukocytosis-resolved  Left diabetic foot ulcer-Albrecht grade 3  Possible left foot cellulitis-culture growing Pseudomonas, MRSA        Continue dexamethasone-total of 10 days  Supplemental oxygen as needed  Local care  Glycemic control  Continue vancomycin-MRSA coverage  Continue Zosyn-pseudomonas coverage  Supportive care  Podiatry following  Monitor temperature and WBC  Droplet plus precautions  Discharge plan - Zosyn 4.5 gm IV q8h for a total of 2 weeks through 10/30/23, vancomycin IV as ordered through 10/30/23, CBC with diff/CMP/vancomycin trough weekly.    Fatuma Wyman, APRN-CNP

## 2023-10-24 NOTE — PROGRESS NOTES
Samira López is a 83 y.o. female on day 8 of admission presenting with Acute respiratory failure (CMS/HCC).    Subjective   Patient seen and examined.  Resting in bed in no acute distress.  Awake alert oriented x 3.  No new complaints.  No shortness of breath, cough.  No dizziness, lightheadedness.  Left heel / foot pain, controlled.  No fevers chills or sweats    Objective     Physical Exam  Constitutional:       General: She is not in acute distress.     Appearance: She is obese. She is not ill-appearing, toxic-appearing or diaphoretic.   HENT:      Head: Normocephalic and atraumatic.      Right Ear: Tympanic membrane normal.      Left Ear: Tympanic membrane normal.      Nose: Nose normal.      Mouth/Throat:      Mouth: Mucous membranes are moist.      Pharynx: Oropharynx is clear.   Eyes:      Extraocular Movements: Extraocular movements intact.      Conjunctiva/sclera: Conjunctivae normal.      Pupils: Pupils are equal, round, and reactive to light.   Cardiovascular:      Rate and Rhythm: Regular rhythm. Bradycardia present.      Pulses: Normal pulses.      Heart sounds: Normal heart sounds.   Pulmonary:      Effort: Pulmonary effort is normal. No respiratory distress.      Breath sounds: Normal breath sounds. No wheezing or rales.      Comments: Respirations even and unlabored on 2 liters nasal cannula (home oxygen)  Abdominal:      General: Bowel sounds are normal. There is no distension.      Palpations: Abdomen is soft.      Tenderness: There is no abdominal tenderness. There is no guarding.   Genitourinary:     Comments: /rectal examination deferred  Musculoskeletal:         General: Swelling present. Normal range of motion.      Cervical back: Normal range of motion and neck supple.      Right lower leg: Edema present.      Left lower leg: Edema present.      Comments: Decreased edema  Left foot TMA dressing intact   Skin:     General: Skin is warm and dry.      Capillary Refill: Capillary refill  "takes less than 2 seconds.      Comments: Right upper extremity PICC line in place dressing clean dry intact old blood beneath dressing  Left foot TMA dressing intact  Prafo boots in place   Neurological:      General: No focal deficit present.      Mental Status: She is alert and oriented to person, place, and time.   Psychiatric:         Mood and Affect: Mood normal.         Behavior: Behavior normal.       Last Recorded Vitals  Blood pressure 151/52, pulse 59, temperature 35.9 °C (96.6 °F), temperature source Temporal, resp. rate 21, height 1.791 m (5' 10.5\"), weight 86.9 kg (191 lb 9.3 oz), SpO2 97 %.    Intake/Output last 3 Shifts:  I/O last 3 completed shifts:  In: 1480 (17 mL/kg) [P.O.:580; IV Piggyback:900]  Out: 1050 (12.1 mL/kg) [Urine:1050 (0.3 mL/kg/hr)]  Weight: 86.9 kg     Telemetry sinus bradycardia rate 50's-60's    Relevant Results  Results for orders placed or performed during the hospital encounter of 10/15/23 (from the past 24 hour(s))   POCT GLUCOSE   Result Value Ref Range    POCT Glucose 203 (H) 74 - 99 mg/dL   Vancomycin   Result Value Ref Range    Vancomycin 14.7 10.0 - 20.0 ug/mL   CBC and Auto Differential   Result Value Ref Range    WBC 13.5 (H) 4.4 - 11.3 x10*3/uL    nRBC 0.1 (H) 0.0 - 0.0 /100 WBCs    RBC 3.10 (L) 4.00 - 5.20 x10*6/uL    Hemoglobin 10.4 (L) 12.0 - 16.0 g/dL    Hematocrit 30.6 (L) 36.0 - 46.0 %    MCV 99 80 - 100 fL    MCH 33.5 26.0 - 34.0 pg    MCHC 34.0 32.0 - 36.0 g/dL    RDW 18.5 (H) 11.5 - 14.5 %    Platelets 254 150 - 450 x10*3/uL    MPV 11.2 7.5 - 11.5 fL    Neutrophils % 83.4 40.0 - 80.0 %    Immature Granulocytes %, Automated 4.1 (H) 0.0 - 0.9 %    Lymphocytes % 7.4 13.0 - 44.0 %    Monocytes % 4.8 2.0 - 10.0 %    Eosinophils % 0.0 0.0 - 6.0 %    Basophils % 0.3 0.0 - 2.0 %    Neutrophils Absolute 11.23 (H) 1.60 - 5.50 x10*3/uL    Immature Granulocytes Absolute, Automated 0.55 (H) 0.00 - 0.50 x10*3/uL    Lymphocytes Absolute 0.99 0.80 - 3.00 x10*3/uL    " Monocytes Absolute 0.65 0.05 - 0.80 x10*3/uL    Eosinophils Absolute 0.00 0.00 - 0.40 x10*3/uL    Basophils Absolute 0.04 0.00 - 0.10 x10*3/uL   Basic Metabolic Panel   Result Value Ref Range    Glucose 291 (H) 65 - 99 mg/dL    Sodium 134 133 - 145 mmol/L    Potassium 4.0 3.4 - 5.1 mmol/L    Chloride 102 97 - 107 mmol/L    Bicarbonate 22 (L) 24 - 31 mmol/L    Urea Nitrogen 19 8 - 25 mg/dL    Creatinine 0.70 0.40 - 1.60 mg/dL    eGFR 86 >60 mL/min/1.73m*2    Calcium 8.6 8.5 - 10.4 mg/dL    Anion Gap 10 <=19 mmol/L   POCT GLUCOSE   Result Value Ref Range    POCT Glucose 262 (H) 74 - 99 mg/dL   POCT GLUCOSE   Result Value Ref Range    POCT Glucose 115 (H) 74 - 99 mg/dL   POCT GLUCOSE   Result Value Ref Range    POCT Glucose 135 (H) 74 - 99 mg/dL   POCT GLUCOSE   Result Value Ref Range    POCT Glucose 214 (H) 74 - 99 mg/dL     No results found.    Scheduled medications  apixaban, 2.5 mg, oral, BID  ascorbic acid, 500 mg, oral, Daily with breakfast  aspirin, 81 mg, oral, Daily  cholecalciferol, 2,000 Units, oral, Daily  dexAMETHasone, 6 mg, oral, BID after meals  dorzolamide, 1 drop, Both Eyes, BID  exemestane, 25 mg, oral, Daily  ferrous gluconate, 324 mg, oral, BID after meals  insulin lispro, 0-15 Units, subcutaneous, TID with meals  ipratropium-albuteroL, 3 mL, nebulization, TID  ketorolac, 1 drop, Both Eyes, 4x daily  latanoprost, 1 drop, Both Eyes, Nightly  lidocaine, 1 mL, infiltration, Once  oxybutynin, 5 mg, oral, BID  pantoprazole, 40 mg, oral, Daily before breakfast  piperacillin-tazobactam, 4.5 g, intravenous, q8h  polyethylene glycol, 17 g, oral, Daily  rosuvastatin, 20 mg, oral, Daily  vancomycin-diluent combo no.1, 1,500 mg, intravenous, q24h      Continuous medications  oxygen, , Last Rate: 3 L/min (10/17/23 1115)      PRN medications  PRN medications: acetaminophen **OR** acetaminophen **OR** acetaminophen, acetaminophen, alteplase, benzocaine-menthol, dextromethorphan-guaifenesin, dextrose 10 % in water  (D10W), dextrose, diphenhydrAMINE, glucagon, guaiFENesin, oxyCODONE, polyethylene glycol, tiZANidine    ASSESSMENT:  Generalized weakness  Asthenia  Recurrent falls  COVID-19  Acute (resolved) on chronic hypoxic respiratory failure  History of tobacco dependence  Left foot cellulitis  Left foot diabetic wound pseudomonas, MRSA  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  No new issues.  Telemetry sinus bradycardic rate 50's-60's  Asymptomatic.  Continue to hold beta blocker.  Monitor telemetry and blood pressure.  Cardiology input appreciated.  Follow up recommendations.  Respiratory status, pulse oximetry remains stable on oxygen.  IV Remdesivir course complete.  P.o Dexamethasone.  Oxygen.  Pulmonary hygiene.  Encourage incentive spirometer use 10x/hour while awake.  Maintain isolation precautions per protocol.  Podiatry, Infectious disease following.  Left foot wound culture grew Pseudomonas A + MRSA.  Blood cultures without growth.  Afebrile.  Non toxic appearing.  IV antibiotics Vancomycin (pharmacy dosing), Zosyn.  Left lower extremity, foot wound care per Podiatry.  Pain controlled.  Continue current analgesics.  Point of care glucose reviewed.  Monitor point of care glucose AC/HS.  Continue sliding scale insulin.  Adjust insulin as needed for glycemic control.  PT/OT.  Fall precautions.  Deep vein thrombosis prophylaxis.  Eliquis.  GI prophylaxis Ppi Protonix.  Pressure ulcer prevention measures.  Turn and reposition every 2 hours and as needed.  Increase activity.  Supportive care.  Patient reassured.  Case management following for discharge planning.  Awaiting discharge  arrangements.  Discussed with Dr. Ríos.  Anticipate discharge when cleared by consultations and arrangements are made by case management.         MALIK Dobbs-CNP

## 2023-10-24 NOTE — PROGRESS NOTES
"Samira López is a 83 y.o. female on day 9 seen in follow-up for acute on chronic hypoxic respiratory failure.    Subjective   Sitting up in chair. On 4 L nasal cannula oxygen; oxygen sats 98%. No respiratory complaints. Denies pain. Afebrile.       Objective     Physical Exam  Vitals and nursing note reviewed.   Constitutional:       Appearance: Normal appearance.   HENT:      Head: Normocephalic and atraumatic.      Nose: Nose normal.      Mouth/Throat:      Mouth: Mucous membranes are moist.   Eyes:      Extraocular Movements: Extraocular movements intact.      Conjunctiva/sclera: Conjunctivae normal.      Pupils: Pupils are equal, round, and reactive to light.   Cardiovascular:      Rate and Rhythm: Regular rhythm. Bradycardia present.      Pulses: Normal pulses.      Heart sounds: Normal heart sounds.   Pulmonary:      Effort: Pulmonary effort is normal.      Comments: Lungs diminished but clear.  Abdominal:      General: Bowel sounds are normal.      Palpations: Abdomen is soft.   Musculoskeletal:         General: Normal range of motion.      Right lower leg: Edema present.      Left lower leg: Edema present.      Comments: Bilateral lower extremities with dry and intact dressings in place.   Skin:     General: Skin is warm and dry.      Capillary Refill: Capillary refill takes less than 2 seconds.   Neurological:      General: No focal deficit present.      Mental Status: She is alert and oriented to person, place, and time.   Psychiatric:         Mood and Affect: Mood normal.         Behavior: Behavior normal.         Last Recorded Vitals  Blood pressure 141/51, pulse 66, temperature 36.7 °C (98.1 °F), temperature source Temporal, resp. rate 21, height 1.791 m (5' 10.5\"), weight 85.9 kg (189 lb 6 oz), SpO2 98 %.  Intake/Output last 3 Shifts:  I/O last 3 completed shifts:  In: 840 (9.8 mL/kg) [P.O.:340; IV Piggyback:500]  Out: - (0 mL/kg)   Weight: 85.9 kg     Results for orders placed or performed during " the hospital encounter of 10/15/23 (from the past 24 hour(s))   POCT GLUCOSE   Result Value Ref Range    POCT Glucose 135 (H) 74 - 99 mg/dL   POCT GLUCOSE   Result Value Ref Range    POCT Glucose 214 (H) 74 - 99 mg/dL   Basic Metabolic Panel   Result Value Ref Range    Glucose 171 (H) 65 - 99 mg/dL    Sodium 135 133 - 145 mmol/L    Potassium 4.1 3.4 - 5.1 mmol/L    Chloride 104 97 - 107 mmol/L    Bicarbonate 21 (L) 24 - 31 mmol/L    Urea Nitrogen 19 8 - 25 mg/dL    Creatinine 0.70 0.40 - 1.60 mg/dL    eGFR 86 >60 mL/min/1.73m*2    Calcium 8.6 8.5 - 10.4 mg/dL    Anion Gap 10 <=19 mmol/L   CBC   Result Value Ref Range    WBC 14.6 (H) 4.4 - 11.3 x10*3/uL    nRBC 0.0 0.0 - 0.0 /100 WBCs    RBC 3.50 (L) 4.00 - 5.20 x10*6/uL    Hemoglobin 10.5 (L) 12.0 - 16.0 g/dL    Hematocrit 33.4 (L) 36.0 - 46.0 %    MCV 95 80 - 100 fL    MCH 30.0 26.0 - 34.0 pg    MCHC 31.4 (L) 32.0 - 36.0 g/dL    RDW 17.3 (H) 11.5 - 14.5 %    Platelets 243 150 - 450 x10*3/uL    MPV 11.0 7.5 - 11.5 fL   POCT GLUCOSE   Result Value Ref Range    POCT Glucose 146 (H) 74 - 99 mg/dL   POCT GLUCOSE   Result Value Ref Range    POCT Glucose 158 (H) 74 - 99 mg/dL     Relevant Results  NM bone whole body  Result Date: 10/19/2023  FINDINGS: There is new abnormal uptake within the mid diaphysis of the right humerus. There is new uptake within the posterior aspect of the left 11th rib as well as the medial aspect of the left 7th rib. Abnormal uptake is again noted involving the left intertrochanteric region, the xiphoid process, the T3 vertebral body the left 5th rib, the T5 vertebral body. Abnormal uptake is again noted involving the 1st right sterno clavicular joint. Multiple foci are also noted within the cervical spine. Increased uptake is noted involving the bilateral shoulders and bilateral knees and bilateral ankles. The kidneys and bladder are unremarkable. The soft tissues are unremarkable.  New abnormal uptake involving the diaphysis of the right  humerus as well as the left 7th rib and left 11th rib concerning for new metastatic foci.   Previously identified areas of abnormal uptake again noted       XR chest 1 view  Result Date: 10/15/2023  FINDINGS: The study is limited due to rotation and poor inspiratory effort, with resultant crowding of the pulmonary vasculature. The cardiac silhouette is borderline prominent, exaggerated by the technique. Mild bilateral perihilar interstitial infiltrates are noted. There is no pneumothorax or significant effusion. The osseous structures are unremarkable.         Medications:  apixaban, 2.5 mg, oral, BID  ascorbic acid, 500 mg, oral, Daily with breakfast  aspirin, 81 mg, oral, Daily  cholecalciferol, 2,000 Units, oral, Daily  dexAMETHasone, 6 mg, oral, BID after meals  dorzolamide, 1 drop, Both Eyes, BID  exemestane, 25 mg, oral, Daily  ferrous gluconate, 324 mg, oral, BID after meals  insulin lispro, 0-15 Units, subcutaneous, TID with meals  ipratropium-albuteroL, 3 mL, nebulization, TID  ketorolac, 1 drop, Both Eyes, 4x daily  latanoprost, 1 drop, Both Eyes, Nightly  lidocaine, 1 mL, infiltration, Once  oxybutynin, 5 mg, oral, BID  pantoprazole, 40 mg, oral, Daily before breakfast  piperacillin-tazobactam, 4.5 g, intravenous, q8h  polyethylene glycol, 17 g, oral, Daily  rosuvastatin, 20 mg, oral, Daily  vancomycin-diluent combo no.1, 1,500 mg, intravenous, q24h     PRN medications: acetaminophen **OR** acetaminophen **OR** acetaminophen, acetaminophen, alteplase, benzocaine-menthol, dextromethorphan-guaifenesin, dextrose 10 % in water (D10W), dextrose, diphenhydrAMINE, glucagon, guaiFENesin, oxyCODONE, polyethylene glycol, tiZANidine          Results for orders placed or performed during the hospital encounter of 10/15/23 (from the past 24 hour(s))   POCT GLUCOSE   Result Value Ref Range    POCT Glucose 135 (H) 74 - 99 mg/dL   POCT GLUCOSE   Result Value Ref Range    POCT Glucose 214 (H) 74 - 99 mg/dL   Basic  Metabolic Panel   Result Value Ref Range    Glucose 171 (H) 65 - 99 mg/dL    Sodium 135 133 - 145 mmol/L    Potassium 4.1 3.4 - 5.1 mmol/L    Chloride 104 97 - 107 mmol/L    Bicarbonate 21 (L) 24 - 31 mmol/L    Urea Nitrogen 19 8 - 25 mg/dL    Creatinine 0.70 0.40 - 1.60 mg/dL    eGFR 86 >60 mL/min/1.73m*2    Calcium 8.6 8.5 - 10.4 mg/dL    Anion Gap 10 <=19 mmol/L   CBC   Result Value Ref Range    WBC 14.6 (H) 4.4 - 11.3 x10*3/uL    nRBC 0.0 0.0 - 0.0 /100 WBCs    RBC 3.50 (L) 4.00 - 5.20 x10*6/uL    Hemoglobin 10.5 (L) 12.0 - 16.0 g/dL    Hematocrit 33.4 (L) 36.0 - 46.0 %    MCV 95 80 - 100 fL    MCH 30.0 26.0 - 34.0 pg    MCHC 31.4 (L) 32.0 - 36.0 g/dL    RDW 17.3 (H) 11.5 - 14.5 %    Platelets 243 150 - 450 x10*3/uL    MPV 11.0 7.5 - 11.5 fL   POCT GLUCOSE   Result Value Ref Range    POCT Glucose 146 (H) 74 - 99 mg/dL   POCT GLUCOSE   Result Value Ref Range    POCT Glucose 158 (H) 74 - 99 mg/dL        Assessment/Plan   Principal Problem:    Acute respiratory failure (CMS/[facility])  Active Problems:    Arthritis, multiple joint involvement    Balance disorder    Lumbar canal stenosis    Chronic diastolic congestive heart failure (CMS/[facility])    Dermatitis    Obstructive sleep apnea, adult    Peripheral polyneuropathy    Anemia due to chronic kidney disease    Cellulitis and abscess of lower extremity    Cellulitis of left lower limb    Cutaneous abscess of left lower limb    Malignant neoplasm of unspecified site of unspecified female breast (CMS/[facility])    NATALIYA (obstructive sleep apnea)    Personal history of nicotine dependence    Primary generalized (osteo)arthritis    Abnormal computed tomography scan    Accidental fall    Anxiety    Asthenia    Chronic respiratory failure with hypoxia (CMS/HCC)    Diabetes mellitus type 2 in obese (CMS/[facility])    Gangrene of foot (CMS/[facility])    Dyspnea    COVID-19    Oxygen at baseline  Continue IBD/ICS  Insentive spirometry/pulmonary hygiene  Droplet plus precautions   Zosyn 4.5 gm IV q8h  for a total of 2 weeks through 10/30/23, vancomycin IV as ordered through 10/30/23, CBC with diff/CMP/vancomycin trough weekly per Infectious Disease  Follow-up cultures  Remdesivir completed  Oral dexamethasone for a 10 day total  Eliquis   GI prophylaxis  PT/OT/out of bed  Discharge planning-awaiting precert for [facility]  Stable for discharge to rehab from a pulmonary standpoint  Follow-up with Dr. Zimmerman in 2 weeks     I spent minutes in the professional and overall care of this patient.    MALIK Mcintyre-[facility]

## 2023-10-24 NOTE — CARE PLAN
Problem: Respiratory  Goal: Minimal/no exertional discomfort or dyspnea this shift  Outcome: Progressing  Goal: Verbalize decreased shortness of breath this shift  Outcome: Progressing  Goal: Wean oxygen to maintain O2 saturation per order/standard this shift  Outcome: Progressing  Goal: No signs of respiratory distress (eg. Use of accessory muscles. Peds grunting)  Outcome: Progressing

## 2023-10-24 NOTE — NURSING NOTE
Assumed care of pt. Pt sleeping in bed. NAD. No signs of pain per the FLACC scale. 3LNC. BSSR completed

## 2023-10-24 NOTE — DISCHARGE INSTRUCTIONS
Wound care instructions:  Left foot ulcerations to be cleansed with normal saline or wound wash.  Pat dry.  Apply Betadine to the anterior ankle, dorsum foot, and posterior heel ulcerations.  Place calcium alginate with silver to the base of these ulcers.  Pad with 4 x 4's, multiple ABDs, and lightly wrapped Kerlix.  No compression.  PRAFO boots on at all times while in bed.  Offload heels at all times.    Patient to follow-up with me in my office within 1 week of discharge.      Anders Loera DPM    Continue oxygen 3 liters nasal cannula continuously    Use incentive spirometer 10x/hour while awake    If you have any questions, please contact Dr. Ríos's office at [phone].

## 2023-10-24 NOTE — PROGRESS NOTES
Dc plan is for pt to go to [place] [facility]. Precert was started yesterday. If no precert by tomorrow, it will need escalated. MSW to follow for dc planning to SNF.    Safe dc plan not secured yet-waiting on precert    Annalee CARRA, LSW

## 2023-10-25 VITALS
DIASTOLIC BLOOD PRESSURE: 57 MMHG | HEIGHT: 71 IN | BODY MASS INDEX: 26.51 KG/M2 | TEMPERATURE: 97.5 F | HEART RATE: 64 BPM | RESPIRATION RATE: 16 BRPM | WEIGHT: 189.38 LBS | OXYGEN SATURATION: 98 % | SYSTOLIC BLOOD PRESSURE: 152 MMHG

## 2023-10-25 PROBLEM — R06.00 DYSPNEA: Status: RESOLVED | Noted: 2023-09-06 | Resolved: 2023-10-25

## 2023-10-25 PROBLEM — L97.329: Status: ACTIVE | Noted: 2023-10-25

## 2023-10-25 PROBLEM — L89.629 DECUBITUS ULCER OF LEFT HEEL: Status: ACTIVE | Noted: 2023-10-25

## 2023-10-25 PROBLEM — J96.00 ACUTE RESPIRATORY FAILURE (MULTI): Status: RESOLVED | Noted: 2023-10-15 | Resolved: 2023-10-25

## 2023-10-25 PROBLEM — U07.1 COVID-19: Status: ACTIVE | Noted: 2023-10-25

## 2023-10-25 LAB
ANION GAP SERPL CALC-SCNC: 10 MMOL/L
BUN SERPL-MCNC: 22 MG/DL (ref 8–25)
CALCIUM SERPL-MCNC: 8.7 MG/DL (ref 8.5–10.4)
CHLORIDE SERPL-SCNC: 104 MMOL/L (ref 97–107)
CO2 SERPL-SCNC: 21 MMOL/L (ref 24–31)
CREAT SERPL-MCNC: 0.7 MG/DL (ref 0.4–1.6)
ERYTHROCYTE [DISTWIDTH] IN BLOOD BY AUTOMATED COUNT: 18.2 % (ref 11.5–14.5)
GFR SERPL CREATININE-BSD FRML MDRD: 86 ML/MIN/1.73M*2
GLUCOSE BLD MANUAL STRIP-MCNC: 133 MG/DL (ref 74–99)
GLUCOSE BLD MANUAL STRIP-MCNC: 161 MG/DL (ref 74–99)
GLUCOSE BLD MANUAL STRIP-MCNC: 231 MG/DL (ref 74–99)
GLUCOSE SERPL-MCNC: 194 MG/DL (ref 65–99)
HCT VFR BLD AUTO: 32.5 % (ref 36–46)
HGB BLD-MCNC: 10.7 G/DL (ref 12–16)
MCH RBC QN AUTO: 32 PG (ref 26–34)
MCHC RBC AUTO-ENTMCNC: 32.9 G/DL (ref 32–36)
MCV RBC AUTO: 97 FL (ref 80–100)
NRBC BLD-RTO: 0 /100 WBCS (ref 0–0)
PLATELET # BLD AUTO: 249 X10*3/UL (ref 150–450)
PMV BLD AUTO: 11.2 FL (ref 7.5–11.5)
POTASSIUM SERPL-SCNC: 4.3 MMOL/L (ref 3.4–5.1)
RBC # BLD AUTO: 3.34 X10*6/UL (ref 4–5.2)
SODIUM SERPL-SCNC: 135 MMOL/L (ref 133–145)
WBC # BLD AUTO: 11.6 X10*3/UL (ref 4.4–11.3)

## 2023-10-25 PROCEDURE — 82947 ASSAY GLUCOSE BLOOD QUANT: CPT

## 2023-10-25 PROCEDURE — 80048 BASIC METABOLIC PNL TOTAL CA: CPT | Performed by: NURSE PRACTITIONER

## 2023-10-25 PROCEDURE — 2500000004 HC RX 250 GENERAL PHARMACY W/ HCPCS (ALT 636 FOR OP/ED): Performed by: INTERNAL MEDICINE

## 2023-10-25 PROCEDURE — 94640 AIRWAY INHALATION TREATMENT: CPT

## 2023-10-25 PROCEDURE — 2500000001 HC RX 250 WO HCPCS SELF ADMINISTERED DRUGS (ALT 637 FOR MEDICARE OP): Performed by: INTERNAL MEDICINE

## 2023-10-25 PROCEDURE — 2500000002 HC RX 250 W HCPCS SELF ADMINISTERED DRUGS (ALT 637 FOR MEDICARE OP, ALT 636 FOR OP/ED): Performed by: INTERNAL MEDICINE

## 2023-10-25 PROCEDURE — 85027 COMPLETE CBC AUTOMATED: CPT | Performed by: NURSE PRACTITIONER

## 2023-10-25 PROCEDURE — 2500000004 HC RX 250 GENERAL PHARMACY W/ HCPCS (ALT 636 FOR OP/ED): Performed by: NURSE PRACTITIONER

## 2023-10-25 RX ORDER — NAPROXEN SODIUM 220 MG/1
81 TABLET, FILM COATED ORAL DAILY
Start: 2023-10-25 | End: 2023-11-30

## 2023-10-25 RX ORDER — METOPROLOL TARTRATE 25 MG/1
25 TABLET, FILM COATED ORAL 2 TIMES DAILY
Status: DISCONTINUED | OUTPATIENT
Start: 2023-10-25 | End: 2023-10-25 | Stop reason: HOSPADM

## 2023-10-25 RX ORDER — KETOROLAC TROMETHAMINE 5 MG/ML
1 SOLUTION OPHTHALMIC 4 TIMES DAILY
Start: 2023-10-25 | End: 2023-11-30

## 2023-10-25 RX ORDER — METOPROLOL TARTRATE 25 MG/1
25 TABLET, FILM COATED ORAL 2 TIMES DAILY
Start: 2023-10-25 | End: 2023-11-30

## 2023-10-25 RX ORDER — GUAIFENESIN 600 MG/1
600 TABLET, EXTENDED RELEASE ORAL EVERY 12 HOURS PRN
Start: 2023-10-25 | End: 2023-11-30

## 2023-10-25 RX ORDER — OXYCODONE HYDROCHLORIDE 5 MG/1
5 TABLET ORAL EVERY 4 HOURS PRN
Qty: 15 TABLET | Refills: 0 | Status: SHIPPED | OUTPATIENT
Start: 2023-10-25 | End: 2023-11-30

## 2023-10-25 RX ORDER — IPRATROPIUM BROMIDE AND ALBUTEROL SULFATE 2.5; .5 MG/3ML; MG/3ML
3 SOLUTION RESPIRATORY (INHALATION)
Start: 2023-10-25 | End: 2023-11-30

## 2023-10-25 RX ORDER — GUAIFENESIN/DEXTROMETHORPHAN 100-10MG/5
5 SYRUP ORAL EVERY 4 HOURS PRN
Qty: 118 ML | Refills: 0
Start: 2023-10-25 | End: 2023-11-30

## 2023-10-25 RX ORDER — INSULIN LISPRO 100 [IU]/ML
0-15 INJECTION, SOLUTION INTRAVENOUS; SUBCUTANEOUS
Start: 2023-10-25 | End: 2023-11-30

## 2023-10-25 RX ADMIN — APIXABAN 2.5 MG: 2.5 TABLET, FILM COATED ORAL at 09:46

## 2023-10-25 RX ADMIN — EXEMESTANE 25 MG: 25 TABLET ORAL at 09:45

## 2023-10-25 RX ADMIN — INSULIN LISPRO 3 UNITS: 100 INJECTION, SOLUTION INTRAVENOUS; SUBCUTANEOUS at 09:47

## 2023-10-25 RX ADMIN — OXYCODONE HYDROCHLORIDE AND ACETAMINOPHEN 500 MG: 500 TABLET ORAL at 09:45

## 2023-10-25 RX ADMIN — ASPIRIN 81 MG CHEWABLE TABLET 81 MG: 81 TABLET CHEWABLE at 09:46

## 2023-10-25 RX ADMIN — PANTOPRAZOLE SODIUM 40 MG: 40 TABLET, DELAYED RELEASE ORAL at 05:22

## 2023-10-25 RX ADMIN — ROSUVASTATIN CALCIUM 20 MG: 20 TABLET, COATED ORAL at 09:46

## 2023-10-25 RX ADMIN — IPRATROPIUM BROMIDE AND ALBUTEROL SULFATE 3 ML: 2.5; .5 SOLUTION RESPIRATORY (INHALATION) at 12:54

## 2023-10-25 RX ADMIN — POLYETHYLENE GLYCOL 3350 17 G: 17 POWDER, FOR SOLUTION ORAL at 09:46

## 2023-10-25 RX ADMIN — DEXAMETHASONE 6 MG: 6 TABLET ORAL at 09:46

## 2023-10-25 RX ADMIN — PIPERACILLIN SODIUM AND TAZOBACTAM SODIUM 4.5 G: 4; .5 INJECTION, SOLUTION INTRAVENOUS at 09:45

## 2023-10-25 RX ADMIN — VANCOMYCIN 1500 MG: 1.5 INJECTION, SOLUTION INTRAVENOUS at 05:22

## 2023-10-25 RX ADMIN — PIPERACILLIN SODIUM AND TAZOBACTAM SODIUM 4.5 G: 4; .5 INJECTION, SOLUTION INTRAVENOUS at 02:42

## 2023-10-25 RX ADMIN — Medication 2000 UNITS: at 09:46

## 2023-10-25 RX ADMIN — KETOROLAC TROMETHAMINE 1 DROP: 0.5 SOLUTION OPHTHALMIC at 13:00

## 2023-10-25 RX ADMIN — Medication 3 L/MIN: at 08:56

## 2023-10-25 RX ADMIN — DORZOLAMIDE HYDROCHLORIDE 1 DROP: 20 SOLUTION/ DROPS OPHTHALMIC at 09:00

## 2023-10-25 RX ADMIN — OXYBUTYNIN CHLORIDE 5 MG: 5 TABLET ORAL at 09:45

## 2023-10-25 RX ADMIN — KETOROLAC TROMETHAMINE 1 DROP: 0.5 SOLUTION OPHTHALMIC at 06:57

## 2023-10-25 RX ADMIN — Medication 324 MG: at 09:45

## 2023-10-25 RX ADMIN — OXYCODONE HYDROCHLORIDE 5 MG: 5 TABLET ORAL at 05:34

## 2023-10-25 ASSESSMENT — COGNITIVE AND FUNCTIONAL STATUS - GENERAL
TURNING FROM BACK TO SIDE WHILE IN FLAT BAD: A LITTLE
STANDING UP FROM CHAIR USING ARMS: A LOT
MOVING FROM LYING ON BACK TO SITTING ON SIDE OF FLAT BED WITH BEDRAILS: A LITTLE
MOVING TO AND FROM BED TO CHAIR: A LOT
MOBILITY SCORE: 14
WALKING IN HOSPITAL ROOM: A LITTLE
CLIMB 3 TO 5 STEPS WITH RAILING: TOTAL

## 2023-10-25 ASSESSMENT — PAIN SCALES - GENERAL
PAINLEVEL_OUTOF10: 7
PAINLEVEL_OUTOF10: 0 - NO PAIN

## 2023-10-25 ASSESSMENT — PAIN - FUNCTIONAL ASSESSMENT: PAIN_FUNCTIONAL_ASSESSMENT: 0-10

## 2023-10-25 NOTE — PROGRESS NOTES
Samira López is a 83 y.o. female on day 10 of admission presenting with Acute respiratory failure (CMS/HCC).    Subjective   Interval History:   Room not entered to limit exposure  Examined through window-awake, laying in bed, nasal cannula in place, no respiratory distress or tachypnea noted.   Discussed with nursing    Objective   Range of Vitals (last 24 hours)  Heart Rate:  [60-69]   Temp:  [36 °C (96.8 °F)-36.7 °C (98.1 °F)]   Resp:  [16-17]   BP: ()/(51-65)   SpO2:  [93 %-98 %]   Daily Weight  10/24/23 : 85.9 kg (189 lb 6 oz)    Body mass index is 26.79 kg/m².    Physical Exam  Constitutional:       Appearance: Normal appearance.   HENT:      Head: Normocephalic and atraumatic.   Eyes:      Conjunctiva/sclera: Conjunctivae normal.   Pulmonary:      No tachypnea      Antibiotics  piperacillin-tazobactam-dextrose (Zosyn) IV 3.375 g  vancomycin-diluent combo no.1 (Xellia) IVPB 1,250 mg  acetaminophen (Tylenol) tablet 650 mg  furosemide (Lasix) injection 40 mg  dexAMETHasone (Decadron) injection 10 mg  amLODIPine (Norvasc) tablet 5 mg  apixaban (Eliquis) tablet 2.5 mg  clopidogrel (Plavix) tablet 75 mg  ketorolac (Acular) 0.5 % ophthalmic solution 1 drop  metoprolol succinate XL (Toprol-XL) 24 hr tablet 50 mg  pantoprazole (ProtoNix) EC tablet 40 mg  rosuvastatin (Crestor) tablet 20 mg  aspirin chewable tablet 81 mg  calcium carbonate (Tums) chewable tablet 500 mg  ferrous gluconate (Fergon) 324 (38 Fe) mg tablet 324 mg  omega 3-dha-epa-fish oil 1,200 (144-216) mg capsule 3,600 mg  furosemide (Lasix) tablet 40 mg  oxygen (O2) therapy  cholecalciferol (Vitamin D-3) capsule 50 mcg  biotin tablet 1,000 mcg  clobetasol (Temovate) 0.05 % ointment 1 Application  lisinopril tablet 20 mg  palbociclib (Ibrance) tablet 100 mg  sodium hypochlorite (Dakin's HALF-Strength) 0.25 % external solution  palbociclib (Ibrance) tablet 100 mg  acetaminophen (Tylenol) tablet 325 mg  ascorbic acid (Vitamin C) tablet 500  mg  diphenhydrAMINE (Sominex) tablet 25 mg  exemestane (Aromasin) tablet 25 mg  latanoprost (Xalatan) 0.005 % ophthalmic solution 1 drop  dorzolamide (Trusopt) 2 % ophthalmic solution 1 drop  cholecalciferol (Vitamin D-3) tablet 125 mcg  pantoprazole (ProtoNix) EC tablet 40 mg  oxybutynin (Ditropan) tablet 5 mg  palbociclib (Ibrance) capsule 125 mg  tiZANidine (Zanaflex) tablet 2 mg  piperacillin-tazobactam-dextrose (Zosyn) IV 4.5 g  ipratropium-albuteroL (Duo-Neb) 0.5-2.5 mg/3 mL nebulizer solution 3 mL  dexAMETHasone (Decadron) tablet 6 mg  remdesivir (Veklury) 200 mg in sodium chloride 0.9% 250 mL IV  remdesivir (Veklury) 100 mg in sodium chloride 0.9% 250 mL IV  vancomycin-diluent combo no.1 (Xellia) IVPB 750 mg  acetaminophen (Tylenol) tablet 650 mg  acetaminophen (Tylenol) oral liquid 650 mg  acetaminophen (Tylenol) suppository 650 mg  polyethylene glycol (Glycolax, Miralax) packet 17 g  benzocaine-menthol (Cepastat Sore Throat) 15-3.6 mg lozenge 1 lozenge  dextromethorphan-guaifenesin (Robitussin DM)  mg/5 mL oral liquid 5 mL  guaiFENesin (Mucinex) 12 hr tablet 600 mg  dextrose 50 % injection 25 g  glucagon (Glucagen) injection 1 mg  dextrose 10 % in water (D10W) infusion  insulin lispro (HumaLOG) injection 0-15 Units  piperacillin-tazobactam-dextrose (Zosyn) IV 4.5 g  ipratropium-albuteroL (Duo-Neb) 0.5-2.5 mg/3 mL nebulizer solution 3 mL  lidocaine (Xylocaine) 10 mg/mL (1 %) injection 10 mg  alteplase (Cathflo Activase) injection 2 mg      oxyCODONE (Roxicodone) immediate release tablet  polyethylene glycol (Glycolax, Miralax) packet  oxyCODONE (Roxicodone) immediate release tablet 5 mg  iron sucrose (Venofer) 200 mg in sodium chloride 0.9% 100 mL IVPB  iron sucrose (Venofer) injection 200 mg  oxygen (O2) therapy  vancomycin-diluent combo no.1 (Xellia) IVPB 1 g  polyethylene glycol (Glycolax, Miralax) packet 17 g  tiZANidine (Zanaflex) tablet 2 mg  dilTIAZem (Cardizem) 125 mg in dextrose 5% 125 mL (1  mg/mL) infusion (premix)  dilTIAZem (Cardizem) injection 15 mg  vancomycin-diluent combo no.1 (Xellia) IVPB 1,250 mg  cholecalciferol (Vitamin D-3) tablet 2,000 Units      vancomycin-diluent combo no.1 (Xellia) IVPB 1,500 mg  doxepin (SINEquan) capsule 10 mg  gabapentin (Neurontin) capsule 100 mg      Relevant Results  Labs  Results from last 72 hours   Lab Units 10/25/23  0542 10/24/23  0534 10/23/23  0500   WBC AUTO x10*3/uL 11.6* 14.6* 13.5*   HEMOGLOBIN g/dL 10.7* 10.5* 10.4*   HEMATOCRIT % 32.5* 33.4* 30.6*   PLATELETS AUTO x10*3/uL 249 243 254   NEUTROS PCT AUTO %  --   --  83.4   LYMPHS PCT AUTO %  --   --  7.4   MONOS PCT AUTO %  --   --  4.8   EOS PCT AUTO %  --   --  0.0       Results from last 72 hours   Lab Units 10/25/23  0542 10/24/23  0534 10/23/23  0500   SODIUM mmol/L 135 135 134   POTASSIUM mmol/L 4.3 4.1 4.0   CHLORIDE mmol/L 104 104 102   CO2 mmol/L 21* 21* 22*   BUN mg/dL 22 19 19   CREATININE mg/dL 0.70 0.70 0.70   GLUCOSE mg/dL 194* 171* 291*   CALCIUM mg/dL 8.7 8.6 8.6   ANION GAP mmol/L 10 10 10   EGFR mL/min/1.73m*2 86 86 86           Estimated Creatinine Clearance: 74.5 mL/min (by C-G formula based on SCr of 0.7 mg/dL).  C-Reactive Protein   Date Value Ref Range Status   10/16/2023 5.90 (H) 0.00 - 2.00 mg/dL Final     CRP   Date Value Ref Range Status   09/09/2023 6.3 (H) 0 - 2.0 MG/DL Final     Comment:     Performed at [address] [address]   04/24/2020 0.18 mg/dL Final     Comment:     REF VALUE  < 1.00       Microbiology  Susceptibility data from last 14 days.  Collected Specimen Info Organism Aztreonam Cefepime Ceftazidime Ciprofloxacin Clindamycin Erythromycin Gentamicin Levofloxacin Oxacillin Piperacillin/Tazobactam Tetracycline Tobramycin Trimethoprim/Sulfamethoxazole Vancomycin   10/16/23 Tissue/Biopsy from Diabetic Foot Ulcer Methicillin Resistant Staphylococcus aureus (MRSA)     S R   R  R  R S     Pseudomonas aeruginosa S S S S   S S  S  S     10/15/23  Tissue/Biopsy from Wound/Tissue Pseudomonas aeruginosa S S S S   S S  S  S       Mixed Gram-Positive and Gram-Negative Bacteria                     Imaging  Reviewed  Assessment/Plan   Acute on chronic respiratory failure-on low flow oxygen  Coronavirus infection  Marginal leukocytosis-resolved  Left diabetic foot ulcer-Albrecht grade 3  Possible left foot cellulitis-culture growing Pseudomonas, MRSA        Completes 10 day dexamethasone course today  Supplemental oxygen as needed  Local care  Glycemic control  Continue vancomycin-MRSA coverage  Continue Zosyn-pseudomonas coverage  Supportive care  Podiatry following  Monitor temperature and WBC  Droplet plus precautions  Discharge plan - Zosyn 4.5 gm IV q8h for a total of 2 weeks through 10/30/23, vancomycin IV as ordered through 10/30/23, CBC with diff/CMP/vancomycin trough weekly.  Discharge planning    Fatuma CHILDERS Staff, APRN-CNP

## 2023-10-25 NOTE — PROGRESS NOTES
Pt anticipated to dc today to [facility] at [facility], facility aware. Pt will still need transport set up via roundtrip. [facility] making facility aware pt will dc to them in the next few hours.

## 2023-10-25 NOTE — NURSING NOTE
Assumed of patient.  Secure chat to Dr Malone regarding day 10 of admission and 10 days of isolation.  Asked to advise.    Patient resting quietly in bed.  2L O2 via n/c intact and functioning.    Call light and commonly used items in reach.  Bed locked and in low position.

## 2023-10-25 NOTE — PROGRESS NOTES
Samira López is a 83 y.o. female on day 10 of admission presenting with Acute respiratory failure (CMS/HCC).    Subjective   Patient seen and examined.  Resting in bed in no acute distress.  Awake alert oriented x 3.  No new complaints.    Objective     Physical Exam  Constitutional:       General: She is not in acute distress.     Appearance: She is obese. She is not ill-appearing, toxic-appearing or diaphoretic.   HENT:      Head: Normocephalic and atraumatic.      Right Ear: Tympanic membrane normal.      Left Ear: Tympanic membrane normal.      Nose: Nose normal.      Mouth/Throat:      Mouth: Mucous membranes are moist.      Pharynx: Oropharynx is clear.   Eyes:      Extraocular Movements: Extraocular movements intact.      Conjunctiva/sclera: Conjunctivae normal.      Pupils: Pupils are equal, round, and reactive to light.   Cardiovascular:      Rate and Rhythm: Normal rate and regular rhythm.      Pulses: Normal pulses.      Heart sounds: Normal heart sounds.      Comments: Trace lower extremity edema stable  Pulmonary:      Effort: Pulmonary effort is normal. No respiratory distress.      Breath sounds: Normal breath sounds. No wheezing or rales.      Comments: Respirations even and unlabored on 3 liters nasal cannula (home oxygen)  Abdominal:      General: Bowel sounds are normal. There is no distension.      Palpations: Abdomen is soft.      Tenderness: There is no abdominal tenderness. There is no guarding.   Genitourinary:     Comments: /rectal examination deferred  Musculoskeletal:         General: Swelling present. Normal range of motion.      Cervical back: Normal range of motion and neck supple.      Right lower leg: Edema present.      Left lower leg: Edema present.      Comments: Decreased lower extremity edema  Left foot TMA dressing intact   Skin:     General: Skin is warm and dry.      Capillary Refill: Capillary refill takes less than 2 seconds.      Comments: *Right upper extremity PICC  "line in place dressing clean dry intact old blood beneath dressing. Left foot TMA dressing intact    Neurological:      General: No focal deficit present.      Mental Status: She is alert and oriented to person, place, and time.   Psychiatric:         Mood and Affect: Mood normal.         Behavior: Behavior normal.       Last Recorded Vitals  Blood pressure 141/51, pulse 66, temperature 36.7 °C (98.1 °F), temperature source Temporal, resp. rate 16, height 1.791 m (5' 10.5\"), weight 85.9 kg (189 lb 6 oz), SpO2 98 %.    Intake/Output last 3 Shifts:  I/O last 3 completed shifts:  In: 620 (7.2 mL/kg) [P.O.:120; IV Piggyback:500]  Out: 1800 (21 mL/kg) [Urine:1800 (0.6 mL/kg/hr)]  Weight: 85.9 kg     Telemetry normal sinus rhythm rate 60's    Relevant Results  Results for orders placed or performed during the hospital encounter of 10/15/23 (from the past 24 hour(s))   POCT GLUCOSE   Result Value Ref Range    POCT Glucose 164 (H) 74 - 99 mg/dL   POCT GLUCOSE   Result Value Ref Range    POCT Glucose 265 (H) 74 - 99 mg/dL   Basic Metabolic Panel   Result Value Ref Range    Glucose 194 (H) 65 - 99 mg/dL    Sodium 135 133 - 145 mmol/L    Potassium 4.3 3.4 - 5.1 mmol/L    Chloride 104 97 - 107 mmol/L    Bicarbonate 21 (L) 24 - 31 mmol/L    Urea Nitrogen 22 8 - 25 mg/dL    Creatinine 0.70 0.40 - 1.60 mg/dL    eGFR 86 >60 mL/min/1.73m*2    Calcium 8.7 8.5 - 10.4 mg/dL    Anion Gap 10 <=19 mmol/L   CBC   Result Value Ref Range    WBC 11.6 (H) 4.4 - 11.3 x10*3/uL    nRBC 0.0 0.0 - 0.0 /100 WBCs    RBC 3.34 (L) 4.00 - 5.20 x10*6/uL    Hemoglobin 10.7 (L) 12.0 - 16.0 g/dL    Hematocrit 32.5 (L) 36.0 - 46.0 %    MCV 97 80 - 100 fL    MCH 32.0 26.0 - 34.0 pg    MCHC 32.9 32.0 - 36.0 g/dL    RDW 18.2 (H) 11.5 - 14.5 %    Platelets 249 150 - 450 x10*3/uL    MPV 11.2 7.5 - 11.5 fL   POCT GLUCOSE   Result Value Ref Range    POCT Glucose 161 (H) 74 - 99 mg/dL   POCT GLUCOSE   Result Value Ref Range    POCT Glucose 133 (H) 74 - 99 mg/dL "   POCT GLUCOSE   Result Value Ref Range    POCT Glucose 231 (H) 74 - 99 mg/dL     No results found.    Scheduled medications  apixaban, 2.5 mg, oral, BID  ascorbic acid, 500 mg, oral, Daily with breakfast  aspirin, 81 mg, oral, Daily  cholecalciferol, 2,000 Units, oral, Daily  dexAMETHasone, 6 mg, oral, BID after meals  dorzolamide, 1 drop, Both Eyes, BID  exemestane, 25 mg, oral, Daily  ferrous gluconate, 324 mg, oral, BID after meals  insulin lispro, 0-15 Units, subcutaneous, TID with meals  ipratropium-albuteroL, 3 mL, nebulization, TID  ketorolac, 1 drop, Both Eyes, 4x daily  latanoprost, 1 drop, Both Eyes, Nightly  lidocaine, 1 mL, infiltration, Once  metoprolol tartrate, 25 mg, oral, BID  oxybutynin, 5 mg, oral, BID  pantoprazole, 40 mg, oral, Daily before breakfast  piperacillin-tazobactam, 4.5 g, intravenous, q8h  polyethylene glycol, 17 g, oral, Daily  rosuvastatin, 20 mg, oral, Daily  vancomycin-diluent combo no.1, 1,500 mg, intravenous, q24h      Continuous medications  oxygen, , Last Rate: 3 L/min (10/25/23 0856)      PRN medications  PRN medications: acetaminophen **OR** acetaminophen **OR** acetaminophen, acetaminophen, alteplase, benzocaine-menthol, dextromethorphan-guaifenesin, dextrose 10 % in water (D10W), dextrose, diphenhydrAMINE, glucagon, guaiFENesin, oxyCODONE, polyethylene glycol, tiZANidine    ASSESSMENT:  Generalized weakness  Asthenia  Recurrent Falls  COVID-19  Acute (resolved) on chronic hypoxic respiratory failure stable  History of tobacco dependence  Left foot cellulitis  Left foot diabetic wound Pseudomonas, MRSA  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  No issues.  Per case management's notes patient is able to discharge to facility today.  Continue current treatment.  Respiratory status, pulse oximetry stable on baseline oxygen.  IV Remdesivir complete.  P.o Dexamethasone complete today.  Continue Duoneb treatments.  Oxygen 3 liters nasal cannula, baseline.  Telemetry sinus rhythm rate 60's.  Continue Metoprolol to tartrate 25 milligrams p.o bid with parameters.  Outpatient follow up with Cardiology.   Continue IV antibiotics Vancomycin, Zosyn via right upper extremity PICC line thru 10/30/2023 per Infectious disease.  Left lower extremity/foot/heel local wound care per Podiatry.  Offloading.  Prafo boots.  Outpatient follow up with Podiatry.  Pressure ulcer prevention measures.  Increase protein intake.  Point of care glucose reviewed.  Monitor blood glucose AC/HS.  Sliding scale insulin.  PT/OT.  Fall precautions.  Up with assistance only.  Bed and chair alarm at all times.  Deep vein thrombosis prophylaxis Eliquis.  GI prophylaxis Ppi Protonix.  Supportive care.  Patient reassured.  Discussed with Dr. Ríos.  Fabi to discharge to skilled nursing rehabilitation facility.  See discharge orders and instructions.    Olivia Parrish, APRN-CNP

## 2023-10-25 NOTE — CARE PLAN
The patient's goals for the shift include pain management    The clinical goals for the shift include rest    Over the shift, the patient did not make progress toward the following goals. Barriers to progression include n/a. Recommendations to address these barriers include rest.

## 2023-10-25 NOTE — DISCHARGE SUMMARY
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       glucagon 1 mg injection  Commonly known as: Glucagen      Inject 1 mg into the muscle every 15 minutes if needed for low blood sugar - see comments (For blood glucose less than or equal to 70 mg/dL and no IV access).       guaiFENesin 600 mg 12 hr tablet  Commonly known as: Mucinex      Take 1 tablet (600 mg) by mouth every 12 hours if needed for congestion. Do not crush, chew, or split.       insulin lispro 100 unit/mL injection  Commonly known as: HumaLOG      Inject 0-0.15 mL (0-15 Units) under the skin 3 times a day with meals. Take as directed per insulin instructions.       ipratropium-albuteroL 0.5-2.5 mg/3 mL nebulizer solution  Commonly known as: Duo-Neb      Take 3 mL by nebulization 3 times a day.       metoprolol tartrate 25 mg tablet  Commonly known as: Lopressor      Take 1 tablet (25 mg) by mouth 2 times a day.       sodium chloride 0.9 % piggyback 100 mL with piperacillin-tazobactam 4.5 gram recon soln 4.5 g      Infuse 4.5 g into a venous catheter every 8 hours for 11 days.       vancomycin-diluent combo no.1  IVPB  Commonly known as: Xellia      Infuse 300 mL (1.5 g) at 200 mL/hr over 90 minutes into a venous catheter once every 24 hours for 5 days. Do not start before October 25, 2023.              CHANGE how you take these medications        Instructions Last Dose Given Next Dose Due   cholecalciferol 10 mcg (400 unit) tablet,chewable  Commonly known as: Vitamin D-3  What changed: how much to take      Chew 2 tablets (20 mcg) once daily.       ketorolac 0.5 % ophthalmic solution  Commonly known as: Acular  What changed: See the new instructions.      Administer 1 drop into both eyes 4 times a day.       oxyCODONE 5 mg immediate release tablet  Commonly known as: Roxicodone  What changed:   when to take this  reasons to take this      Take 1 tablet (5 mg) by mouth every 4 hours if needed for moderate pain (4 - 6).       oxygen gas therapy  Commonly known as: O2  What changed:   how much to take  how to take this  when to take this      Inhale 3 L/min continuously.              CONTINUE taking these medications        Instructions Last Dose Given Next Dose Due   acetaminophen 325 mg tablet  Commonly known as: Tylenol           ascorbic acid 500 mg tablet  Commonly known as: Vitamin C           aspirin 81 mg chewable tablet  Commonly known as: Aspirin Childrens      Chew 1 tablet (81 mg) once daily.       diphenhydrAMINE 25 mg tablet  Commonly known as: Sominex           dorzolamide 2 % ophthalmic solution  Commonly known as: Trusopt      Administer 1 drop into both eyes 2 times a day.       Eliquis 5 mg tablet  Generic drug: apixaban           exemestane 25 mg tablet  Commonly known as: Aromasin      Take 1 tablet (25 mg total) by mouth once daily.  Take after a meal.  Try to take at the same time each day.       ferrous sulfate 325 (65 Fe) MG tablet           hydrocortisone 1 % cream           latanoprost 0.005 % ophthalmic solution  Commonly known as: Xalatan      INSTILL  1 DROP INTO BOTH EYES EVERY DAY AT BEDTIME        NexIUM 24HR 20 mg DR capsule  Generic drug: esomeprazole           oxybutynin XL 10 mg 24 hr tablet  Commonly known as: Ditropan-XL           polyethylene glycol packet  Commonly known as: Glycolax, Miralax           rosuvastatin 20 mg tablet  Commonly known as: Crestor      Take 1 tablet (20 mg) by mouth once daily.       tiZANidine 2 mg capsule  Commonly known as: Zanaflex                  STOP taking these medications      Ibrance 100 mg tablet  Generic drug: palbociclib        metoprolol succinate XL 50 mg 24 hr tablet  Commonly known as: Toprol-XL                  Where to Get Your Medications        You can get these medications from any pharmacy    Bring a paper prescription for each of these medications  oxyCODONE 5 mg immediate release tablet       Information about where to get these medications is not yet available    Ask your nurse or doctor about these medications  aspirin 81 mg chewable tablet  dextromethorphan-guaifenesin  mg/5 mL oral liquid  glucagon 1 mg injection  guaiFENesin 600 mg 12 hr tablet  insulin lispro 100 unit/mL injection  ipratropium-albuteroL 0.5-2.5 mg/3 mL nebulizer solution  ketorolac 0.5 % ophthalmic solution  metoprolol tartrate 25 mg tablet  oxygen gas therapy  sodium chloride 0.9 % piggyback 100 mL with piperacillin-tazobactam 4.5 gram recon soln 4.5 g  vancomycin-diluent combo no.1 IVPB         Hospital Course  This is a very pleasant 83-year-old elderly  female who presented to the emergency department complaints of generalized weakness and recurrent falls.  She was recently discharged home from skilled nursing rehabilitation.  She was most recently hospitalized with left foot osteomyelitis.  She underwent left TMA.  She was treated with IV antibiotics  She was discharged to skilled nursing rehabilitation.  For full details, please refer to the record.  In the emergency department, initial work-up was done.  She was diagnosed with COVID-19.  She was treated  in the emergency department and was admitted.  She was treated with IV Remdesivir, Dexamethasone, DuoNeb treatments and oxygen.  She was evaluated and treated by infectious disease.  She was evaluated and treated by Podiatry.  Left foot wound cultures grew Pseudomonas and MRSA.  She was treated the IV antibiotics per Infectious disease.  She was treated with local wound care per Podiatry.  She was evaluated physical and occupational Therapy.  Case management was consulted for discharge planning.  Her condition improved.  She is stable for discharge to skilled nursing rehabilitation.    Pertinent Physical Exam At Time of Discharge  See physical examination    Outpatient Follow-Up  Future Appointments   Date Time Provider Department Center   11/1/2023  1:30 PM INF 14 MENTOR [id number] [facility]   11/9/2023  3:00 PM Shannan Ceballos MD [id number] [facility]   11/29/2023  2:30 PM INF 14 MENTOR [id number] [facility]   11/29/2023  3:20 PM Emmanuel Prajapati MD [id number] [facility]   12/27/2023 12:30 PM INF 00 ARNEX MENTOR [id number] [facility]   1/24/2024 10:40 AM Emmanuel Prajapati MD [id number] [facility]   1/24/2024 11:00 AM INF 00 ARNEX MENTOR [id number] [facility]   2/14/2024  2:00 PM Clare Liu MD [id number] [facility]   2/21/2024  1:00 PM INF 00 ARNEX MENTOR [id number] [facility]   3/20/2024  1:00 PM INF 00 ARNEX MENTOR [id number] [facility]   4/17/2024 10:40 AM Emmanuel Prajapati MD [id number] [facility]   4/17/2024 11:00 AM INF 00 ARNEX MENTOR [id number] [facility]   5/15/2024  1:00 PM INF 00 ARNEX MENTOR [id number] [facility]   6/12/2024  1:00 PM INF 00 ARNEX MENTOR [id number] [facility]   7/10/2024 10:40 AM Emmanuel Prajapati MD [id number] [facility]   7/10/2024 11:00 AM INF 00 ARNEX MENTOR [id number] [facility]     Follow up with primary care provider in 1 week     Olivia Parrish, MALIK-CNP

## 2023-10-25 NOTE — PROGRESS NOTES
Physical Therapy    Physical Therapy Treatment    Patient Name: Samira López  MRN: [id number]  Today's Date: 10/25/2023          Assessment/Plan   PT Assessment  PT Assessment Results: Decreased strength, Decreased endurance, Impaired balance, Decreased mobility, Impaired hearing  Rehab Prognosis: Good  Evaluation/Treatment Tolerance: Patient tolerated treatment well, Patient limited by fatigue  Medical Staff Made Aware: Yes  Strengths: Ability to acquire knowledge, Access to adaptive/assistive products, Attitude of self  Barriers to Participation: Comorbidities, Coping skills  End of Session Communication: Bedside nurse  Assessment Comment: pt appears to be improving with mobility and is able to stand for longer periods of time, she still requires increased assistance to stand but only min A for upright mobility  End of Session Patient Position: Bed, 3 rail up  PT Plan  Inpatient/Swing Bed or Outpatient: Inpatient  PT Plan  Treatment/Interventions: Bed mobility, Gait training, Transfer training, Balance training, Neuromuscular re-education, Strengthening, Endurance training, Range of motion, Therapeutic activity, Therapeutic exercise, Home exercise program, Orthotic fitting/training  PT Plan: Skilled PT  PT Frequency: 4 times per week  PT Discharge Recommendations: Moderate intensity level of continued care  Equipment Recommended upon Discharge: Wheeled walker  PT Recommended Transfer Status: Assist x1  PT - OK to Discharge: Yes      General Visit Information:   PT  Visit  PT Received On: 10/25/23  General  Patient Position Received: Bed, 3 rail up  General Comment: NC3L, tele, PIV    Subjective   Precautions:  Precautions  Hearing/Visual Limitations: wears glasses  LE Weight Bearing Status:  (WBAT with heel off loading shoe)  Medical Precautions: Fall precautions  Post-Surgical Precautions:  (Heel WB'ing with use of surgical shoe LLE)  Precautions Comment: Heel weight bearing with surgical shoe on left.  Vital  Signs:  Vital Signs  Heart Rate: 66  SpO2: 98 %    Objective   Pain:  Pain Assessment  Pain Assessment: 0-10  Pain Score: 0 - No pain  Cognition:      Treatments:     Therapeutic Activity  Therapeutic Activity Performed: Yes  Therapeutic Activity 1: SPT training with FWW x4 mod A for lifting and initial steadying assist, increased cueing for sequencing and hand placement  Therapeutic Activity 2: STS traning into FWW with emphasis on hand placement    Balance/Neuromuscular Re-Education  Balance/Neuromuscular Re-Education Activity 1: static standing with FWW with emphasis on upright posture, and midline standing leaning on RLE for more off loading       Bed Mobility 1  Bed Mobility 1: Supine to sitting  Level of Assistance 1: Minimum assistance    Ambulation/Gait Training 1  Surface 1: Level tile  Device 1: Rolling walker  Assistance 1: Minimum assistance  Comments/Distance (ft) 1: 2x10', limited due extensive line management  Transfer 1  Transfer From 1: Bed to  Transfer to 1: Chair with arms  Transfer Device 1: Walker  Transfer Level of Assistance 1: Moderate assistance     Outcome Measures:  [facility] Basic Mobility  Turning from your back to your side while in a flat bed without using bedrails: A little  Moving from lying on your back to sitting on the side of a flat bed without using bedrails: A little  Moving to and from bed to chair (including a wheelchair): A lot  Standing up from a chair using your arms (e.g. wheelchair or bedside chair): A lot  To walk in hospital room: A little  Climbing 3-5 steps with railing: Total  Basic Mobility - Total Score: 14    OP EDUCATION:  Education  Individual(s) Educated: Patient  Education Provided: Fall Risk, POC  Risk and Benefits Discussed with Patient/Caregiver/Other: yes  Patient/Caregiver Demonstrated Understanding: yes  Plan of Care Discussed and Agreed Upon: yes  Patient Response to Education: Patient/Caregiver Verbalized Understanding of Information    Encounter Problems        Encounter Problems (Active)       Mobility       STG - Patient will ambulate >/= 50 ft with FWW at mod I level. (Progressing)       Start:  10/16/23    Expected End:  10/31/23            STG - Patient will ambulate up and down a curb/step with FWW and CGA x1. (Progressing)       Start:  10/16/23    Expected End:  10/31/23               Mobility       Goal 1Patient will perform functional mobility to and from the bathroom with close supervision and 2ww (Progressing)       Start:  10/16/23    Expected End:  11/16/23               Transfers       STG - Patient to transfer to and from sit to supine at flat bed at mod I level. (Progressing)       Start:  10/16/23    Expected End:  10/31/23            STG - Patient will perform bed mobility at mod I level. (Progressing)       Start:  10/16/23    Expected End:  10/31/23            STG - Patient will transfer sit to and from stand with FWW at mod I level. (Progressing)       Start:  10/16/23    Expected End:  10/31/23               Transfers       LTG - Patient will demonstrate safe transfer techniques independent with 2ww (Progressing)       Start:  10/16/23    Expected End:  11/16/23

## 2023-10-25 NOTE — CONSULTS
Nutrition Assessment Note  Patient admitted Covid19+, still in isolation. Awaiting discharge to SNF pending precert.    Reason for Hospital Admission:  Samira López is a 83 y.o. female who is admitted for acute respiratory failure.    Nutrition Assessment:  Reason for Assessment  Reason for Assessment: Length of stay     has a past medical history of Abnormal findings on diagnostic imaging of heart and coronary circulation (07/30/2019), Abrasion, unspecified foot, initial encounter (06/15/2020), Body mass index (BMI) 31.0-31.9, adult (02/22/2021), Body mass index (BMI) 37.0-37.9, adult, Body mass index (BMI) 38.0-38.9, adult, Body mass index (BMI)30.0-30.9, adult (07/25/2022), Body mass index (BMI)30.0-30.9, adult (06/16/2021), Body mass index (BMI)30.0-30.9, adult (02/28/2022), Body mass index (BMI)30.0-30.9, adult (06/02/2022), Body mass index (BMI)30.0-30.9, adult (04/04/2022), Cellulitis of left lower limb (09/20/2022), Combined forms of age-related cataract, bilateral (07/27/2022), Dyskinesia of esophagus (12/05/2019), Elevated blood-pressure reading, without diagnosis of hypertension (11/03/2015), Epistaxis (11/25/2019), Glaucoma, Hypoxemia (08/17/2020), Idiopathic sleep related nonobstructive alveolar hypoventilation (08/17/2020), Idiopathic sleep related nonobstructive alveolar hypoventilation (08/17/2020), Local infection of the skin and subcutaneous tissue, unspecified (04/13/2020), Nipple discharge (06/02/2017), Non-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 foot with unspecified severity (CMS/HCC) (07/12/2022), Non-pressure chronic ulcer of unspecified part of left lower leg with unspecified severity (CMS/HCC) (06/16/2021), Other conditions influencing health status (05/19/2015), Other disorders of electrolyte and fluid balance, not elsewhere classified (06/26/2019), Other pulmonary embolism with acute cor pulmonale (CMS/HCC) (06/16/2021), Other specified personal risk factors, not elsewhere  classified (07/31/2019), Other specified postprocedural states (06/29/2017), Other symptoms and signs involving the nervous system (08/17/2020), Pain in unspecified foot (04/29/2020), Pain in unspecified hand (07/30/2013), Pain in unspecified hand (06/22/2020), Pain in unspecified hip (06/12/2013), Pain in unspecified hip (06/22/2020), Pain, unspecified (02/28/2022), Personal history of diseases of the skin and subcutaneous tissue (02/12/2020), Personal history of other diseases of the circulatory system (06/16/2021), Personal history of other diseases of the musculoskeletal system and connective tissue (12/17/2020), Personal history of other diseases of the musculoskeletal system and connective tissue (12/04/2020), Personal history of other diseases of the nervous system and sense organs (08/17/2020), Personal history of other diseases of the respiratory system (05/19/2015), Personal history of other diseases of the respiratory system (12/23/2017), Personal history of other specified conditions (06/29/2017), Personal history of other specified conditions (07/05/2018), Personal history of other specified conditions (06/29/2017), Personal history of other specified conditions (07/31/2019), Personal history of other specified conditions (06/22/2020), Personal history of pulmonary embolism (04/06/2022), Preglaucoma, unspecified, unspecified eye, Radiculopathy, lumbar region (02/23/2022), Shortness of breath (08/08/2023), Sleep apnea, unspecified (02/12/2020), Spondylosis without myelopathy or radiculopathy, lumbar region (02/23/2022), Unspecified cataract (06/17/2020), Unspecified cataract, and Unspecified cataract.     Allergies   Allergen Reactions    Cephalosporins Hives    Ciprofloxacin Hives    Codeine Unknown    Epoetin Josue Unknown        Lab Results   Component Value Date    WBC 11.6 (H) 10/25/2023    HGB 10.7 (L) 10/25/2023    HCT 32.5 (L) 10/25/2023     10/25/2023    CHOL 101 10/26/2022    TRIG 102  10/26/2022    HDL 47.9 10/26/2022    ALT 12 10/15/2023    AST 22 10/15/2023     10/25/2023    K 4.3 10/25/2023     10/25/2023    CREATININE 0.70 10/25/2023    BUN 22 10/25/2023    CO2 21 (L) 10/25/2023    TSH 1.78 10/26/2022    INR 1.1 05/12/2023    HGBA1C 5.9 01/06/2023       Current Facility-Administered Medications:     acetaminophen (Tylenol) tablet 650 mg, 650 mg, oral, q4h PRN, 650 mg at 10/20/23 1030 **OR** acetaminophen (Tylenol) oral liquid 650 mg, 650 mg, oral, q4h PRN, 650 mg at 10/15/23 2333 **OR** acetaminophen (Tylenol) suppository 650 mg, 650 mg, rectal, q4h PRN, Jaquan Ríos MD    acetaminophen (Tylenol) tablet 325 mg, 325 mg, oral, q6h PRN, Jaquan Ríos MD    alteplase (Cathflo Activase) injection 2 mg, 2 mg, intra-catheter, PRN, Carlos Malone MD    apixaban (Eliquis) tablet 2.5 mg, 2.5 mg, oral, BID, Jaquan Ríos MD, 2.5 mg at 10/25/23 0946    ascorbic acid (Vitamin C) tablet 500 mg, 500 mg, oral, Daily with breakfast, Jaquan Ríos MD, 500 mg at 10/25/23 0945    aspirin chewable tablet 81 mg, 81 mg, oral, Daily, Jaquan Ríos MD, 81 mg at 10/25/23 0946    benzocaine-menthol (Cepastat Sore Throat) 15-3.6 mg lozenge 1 lozenge, 1 lozenge, Mouth/Throat, q2h PRN, Jaquan Ríos MD    cholecalciferol (Vitamin D-3) tablet 2,000 Units, 2,000 Units, oral, Daily, Jaquan Ríos MD, 2,000 Units at 10/25/23 0946    dexAMETHasone (Decadron) tablet 6 mg, 6 mg, oral, BID after meals, Jaquan Ríos MD, 6 mg at 10/25/23 0946    dextromethorphan-guaifenesin (Robitussin DM)  mg/5 mL oral liquid 5 mL, 5 mL, oral, q4h PRN, Jaquan Ríos MD    dextrose 10 % in water (D10W) infusion, 0.3 g/kg/hr, intravenous, Once PRN, Jaquan Ríos MD    dextrose 50 % injection 25 g, 25 g, intravenous, q15 min PRN, Jaqaun Ríos MD    diphenhydrAMINE (Sominex) tablet 25 mg, 25 mg, oral, q6h PRN, Jaquan Ríos MD    dorzolamide (Trusopt) 2  % ophthalmic solution 1 drop, 1 drop, Both Eyes, BID, Jaquan Ríos MD, 1 drop at 10/25/23 0900    exemestane (Aromasin) tablet 25 mg, 25 mg, oral, Daily, Jaquan Ríos MD, 25 mg at 10/25/23 0945    ferrous gluconate (Fergon) 324 (38 Fe) mg tablet 324 mg, 324 mg, oral, BID after meals, Jaquan Ríos MD, 324 mg at 10/25/23 0945    glucagon (Glucagen) injection 1 mg, 1 mg, intramuscular, q15 min PRN, Jaquan Ríos MD    guaiFENesin (Mucinex) 12 hr tablet 600 mg, 600 mg, oral, q12h PRN, Jaquan Ríos MD    insulin lispro (HumaLOG) injection 0-15 Units, 0-15 Units, subcutaneous, TID with meals, Jaquan Ríos MD, 3 Units at 10/25/23 0947    ipratropium-albuteroL (Duo-Neb) 0.5-2.5 mg/3 mL nebulizer solution 3 mL, 3 mL, nebulization, TID, Jaquan Ríos MD, 3 mL at 10/24/23 1215    ketorolac (Acular) 0.5 % ophthalmic solution 1 drop, 1 drop, Both Eyes, 4x daily, Jaquan Ríos MD, 1 drop at 10/25/23 0657    latanoprost (Xalatan) 0.005 % ophthalmic solution 1 drop, 1 drop, Both Eyes, Nightly, Jaquan Ríos MD, 1 drop at 10/24/23 2115    lidocaine (Xylocaine) 10 mg/mL (1 %) injection 10 mg, 1 mL, infiltration, Once, Carlos Malone MD    oxybutynin (Ditropan) tablet 5 mg, 5 mg, oral, BID, Jaquan Ríos MD, 5 mg at 10/25/23 0945    oxyCODONE (Roxicodone) immediate release tablet 5 mg, 5 mg, oral, q4h PRN, Jaquan Ríos MD, 5 mg at 10/25/23 0534    oxygen (O2) therapy, , inhalation, Continuous, Jaquan Ríos MD, Last Rate: 180,000 mL/hr at 10/25/23 0856, 3 L/min at 10/25/23 0856    pantoprazole (ProtoNix) EC tablet 40 mg, 40 mg, oral, Daily before breakfast, Jaquan Ríos MD, 40 mg at 10/25/23 0522    piperacillin-tazobactam-dextrose (Zosyn) IV 4.5 g, 4.5 g, intravenous, q8h, Jaquan Ríos MD, Stopped at 10/25/23 1030    polyethylene glycol (Glycolax, Miralax) packet 17 g, 17 g, oral, Daily PRN, Jaquan Ríos MD    polyethylene glycol  "(Glycolax, Miralax) packet 17 g, 17 g, oral, Daily, MALIK Dobbs-CNP, 17 g at 10/25/23 0946    rosuvastatin (Crestor) tablet 20 mg, 20 mg, oral, Daily, Jaquan Ríos MD, 20 mg at 10/25/23 0946    tiZANidine (Zanaflex) tablet 2 mg, 2 mg, oral, q6h PRN, MALIK Dobbs-CNP, 2 mg at 10/21/23 0129    vancomycin-diluent combo no.1 (Xellia) IVPB 1,500 mg, 1,500 mg, intravenous, q24h, Jaquan Ríos MD, Stopped at 10/25/23 0652  Nutrition Pertinent meds: Crestoir, Humalog, Protonix, Vit C, D   Propofol @  ml/hr provides:  kcals    Dietary Orders (From admission, onward)       Start     Ordered    10/15/23 2232  Adult diet Regular  Diet effective now        Question:  Diet type  Answer:  Regular    10/15/23 2233                   Nutrition Support Intake provides:      History:  Food and Nutrient History  Energy Intake: Good > 75 %    Anthropometrics:  Ht: 179.1 cm (5' 10.5\"), Wt: 85.9 kg (189 lb 6 oz), BMI: 26.78    IBW/kg (Dietitian Calculated): 68.18 kg     Amputation Calculations:     Estimated Energy Needs  Total Energy Estimated Needs (kCal): 2150 kCal  Total Estimated Energy Need per Day (kCal/kg): 25 kCal/kg  Method for Estimating Needs: Actual Wt    Estimated Protein Needs  Total Protein Estimated Needs (g): 86 g  Total Protein Estimated Needs (g/kg): 1 g/kg  Method for Estimating Needs: Actual Wt    Estimated Fluid Needs  Total Fluid Estimated Needs (mL): 2150 mL  Method for Estimating Needs: 1 mL/kcal    Nutrition Focused Physical Findings:  Subcutaneous Fat Loss  Orbital Fat Pads: Defer  Buccal Fat Pads: Defer  Triceps: Defer  Ribs: Defer    Muscle Wasting  Temporalis: Defer  Pectoralis (Clavicular Region): Defer  Deltoid/Trapezius: Defer  Interosseous: Defer  Trapezius/Infraspinatus/Supraspinatus (Scapular Region): Defer  Quadriceps: Defer  Gastrocnemius: Defer    Nutrition Diagnosis:  Malnutrition Diagnosis  Patient has Malnutrition Diagnosis: No    Patient has Nutrition Diagnosis: " No      Nutrition Interventions/Recommendations:  Food and/or Nutrient Delivery Interventions  Interventions: Meals and snacks    Meals and Snacks: General healthful diet  Goal: Provide as ordered    Education Documentation  No documentation found.    Nutrition Monitoring/Evaluation:  Food and Nutrient Related History     RD Recommendations:     Follow Up  Time Spent (min): 30 minutes  Last Date of Nutrition Visit: 10/25/23  Nutrition Follow-Up Needed?: 7-10 days  Follow up Comment: 11/1/23

## 2023-10-25 NOTE — PROGRESS NOTES
Samira López is a 83 y.o. female on day 10 of admission presenting with Acute respiratory failure (CMS/HCC).    Subjective   Patient seen and examined.  Resting sitting up in the chair in no acute distress.  Awake alert oriented x 3.  No new complaints.  No shortness of breath, cough.  Left heel / foot pain, controlled.  No fevers chills or sweats.  Awaiting discharge    Objective     Physical Exam  Constitutional:       General: She is not in acute distress.     Appearance: She is obese. She is not ill-appearing, toxic-appearing or diaphoretic.   HENT:      Head: Normocephalic and atraumatic.      Right Ear: Tympanic membrane normal.      Left Ear: Tympanic membrane normal.      Nose: Nose normal.      Mouth/Throat:      Mouth: Mucous membranes are moist.      Pharynx: Oropharynx is clear.   Eyes:      Extraocular Movements: Extraocular movements intact.      Conjunctiva/sclera: Conjunctivae normal.      Pupils: Pupils are equal, round, and reactive to light.   Cardiovascular:      Rate and Rhythm: Regular rhythm. Bradycardia present.      Pulses: Normal pulses.      Heart sounds: Normal heart sounds.   Pulmonary:      Effort: Pulmonary effort is normal. No respiratory distress.      Breath sounds: Normal breath sounds. No wheezing or rales.      Comments: Respirations even and unlabored on 3 liters nasal cannula (home oxygen)  Abdominal:      General: Bowel sounds are normal. There is no distension.      Palpations: Abdomen is soft.      Tenderness: There is no abdominal tenderness. There is no guarding.   Genitourinary:     Comments: /rectal examination deferred  Musculoskeletal:         General: Swelling present. Normal range of motion.      Cervical back: Normal range of motion and neck supple.      Right lower leg: Edema present.      Left lower leg: Edema present.      Comments: Decreased edema  Left foot TMA dressing intact   Skin:     General: Skin is warm and dry.      Capillary Refill: Capillary  refill takes less than 2 seconds.      Comments: *Right upper extremity PICC line in place dressing clean dry intact old blood beneath dressing. Left foot TMA dressing intact    Neurological:      General: No focal deficit present.      Mental Status: She is alert and oriented to person, place, and time.   Psychiatric:         Mood and Affect: Mood normal.         Behavior: Behavior normal.       Last Recorded Vitals  Reviewed    Intake/Output last 3 Shifts:  Reviewed    Telemetry normal sinus rhythm rate 60's    Relevant Results  Labs reviewed    Scheduled medications  apixaban, 2.5 mg, oral, BID  ascorbic acid, 500 mg, oral, Daily with breakfast  aspirin, 81 mg, oral, Daily  cholecalciferol, 2,000 Units, oral, Daily  dexAMETHasone, 6 mg, oral, BID after meals  dorzolamide, 1 drop, Both Eyes, BID  exemestane, 25 mg, oral, Daily  ferrous gluconate, 324 mg, oral, BID after meals  insulin lispro, 0-15 Units, subcutaneous, TID with meals  ipratropium-albuteroL, 3 mL, nebulization, TID  ketorolac, 1 drop, Both Eyes, 4x daily  latanoprost, 1 drop, Both Eyes, Nightly  lidocaine, 1 mL, infiltration, Once  oxybutynin, 5 mg, oral, BID  pantoprazole, 40 mg, oral, Daily before breakfast  piperacillin-tazobactam, 4.5 g, intravenous, q8h  polyethylene glycol, 17 g, oral, Daily  rosuvastatin, 20 mg, oral, Daily  vancomycin-diluent combo no.1, 1,500 mg, intravenous, q24h      Continuous medications  oxygen, , Last Rate: 3 L/min (10/17/23 1115)      PRN medications  PRN medications: acetaminophen **OR** acetaminophen **OR** acetaminophen, acetaminophen, alteplase, benzocaine-menthol, dextromethorphan-guaifenesin, dextrose 10 % in water (D10W), dextrose, diphenhydrAMINE, glucagon, guaiFENesin, oxyCODONE, polyethylene glycol, tiZANidine    ASSESSMENT:  Generalized weakness  Asthenia  Recurrent falls  COVID-19  Acute (resolved) on chronic hypoxic respiratory failure stable  History of tobacco dependence  Left foot cellulitis  Left  foot diabetic wound pseudomonas, MRSA  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  No new issues.  Telemetry rate 60's.  Continue to hold beta blocker.  Outpatient follow up with Cardiology.  Respiratory status, pulse oximetry stable on 3 liters nasal cannula, baseline.  IV Remdesivir course complete.  P.o Dexamethasone x 10 days.  Oxygen.  Pulmonary hygiene.  Encourage incentive spirometer use 10x/hour while awake.  Maintain isolation precautions per protocol.  Podiatry, Infectious disease following.  Left foot wound culture grew Pseudomonas A + MRSA.  Blood cultures without growth.  Afebrile.  Non toxic appearing.  IV antibiotics via right upper extremity PICC line - Vancomycin (pharmacy dosing), Zosyn.  Weekly labs.  Left lower extremity, foot wound care per Podiatry.  Offloading.  Pain controlled.  Continue current analgesics.  Outpatient follow up with Podiatry.  Point of care glucose reviewed.  Monitor point of care glucose AC/HS.  Continue sliding scale insulin.  Adjust as needed for glycemic control.  PT/OT.  Fall precautions.  Deep vein thrombosis prophylaxis.  Eliquis.  Gi prophylaxis Ppi Protonix.  Pressure ulcer prevention measures.  Turn and reposition every 2 hours and as needed.  Increase activity.  Supportive care.  Patient reassured.  Case management following for discharge planning.  Awaiting discharge arrangements.  Discussed with Dr. Ríos.  Fabi to discharge to skilled nursing rehabilitation when arrangements are made by case management.    Olivia Parrish, APRN-CNP

## 2023-10-25 NOTE — NURSING NOTE
On rounding, R PICC dressing is dry and intact. One line in use, infusing without difficulty. Other line flushes easily with brisk blood return. Curos cap applied to line not in use.

## 2023-10-25 NOTE — NURSING NOTE
Report has been called to SNF.  Personal belongings packed up for patient and eye gtts in bag.    3L O2 via n/c intact and functioning.  [facility] to transport via w/c.

## 2023-11-01 ENCOUNTER — INFUSION (OUTPATIENT)
Dept: HEMATOLOGY/ONCOLOGY | Facility: CLINIC | Age: 83
End: 2023-11-01
Payer: MEDICARE

## 2023-11-01 VITALS
WEIGHT: 175.04 LBS | RESPIRATION RATE: 18 BRPM | SYSTOLIC BLOOD PRESSURE: 141 MMHG | BODY MASS INDEX: 24.76 KG/M2 | TEMPERATURE: 97.5 F | DIASTOLIC BLOOD PRESSURE: 55 MMHG | HEART RATE: 56 BPM | OXYGEN SATURATION: 100 %

## 2023-11-01 DIAGNOSIS — R78.81 BACTEREMIA: ICD-10-CM

## 2023-11-01 DIAGNOSIS — C50.919 CARCINOMA OF BREAST METASTATIC TO BONE, UNSPECIFIED LATERALITY (MULTI): ICD-10-CM

## 2023-11-01 DIAGNOSIS — C79.51 CARCINOMA OF BREAST METASTATIC TO BONE, UNSPECIFIED LATERALITY (MULTI): ICD-10-CM

## 2023-11-01 PROCEDURE — 2500000004 HC RX 250 GENERAL PHARMACY W/ HCPCS (ALT 636 FOR OP/ED): Mod: JZ,JG | Performed by: INTERNAL MEDICINE

## 2023-11-01 PROCEDURE — 96402 CHEMO HORMON ANTINEOPL SQ/IM: CPT

## 2023-11-01 RX ORDER — HEPARIN SODIUM,PORCINE/PF 10 UNIT/ML
50 SYRINGE (ML) INTRAVENOUS AS NEEDED
Status: CANCELLED | OUTPATIENT
Start: 2023-11-01

## 2023-11-01 RX ORDER — HEPARIN 100 UNIT/ML
500 SYRINGE INTRAVENOUS AS NEEDED
Status: DISCONTINUED | OUTPATIENT
Start: 2023-11-01 | End: 2023-11-01 | Stop reason: HOSPADM

## 2023-11-01 RX ORDER — LAMOTRIGINE 25 MG/1
500 TABLET ORAL ONCE
Status: COMPLETED | OUTPATIENT
Start: 2023-11-01 | End: 2023-11-01

## 2023-11-01 RX ORDER — HEPARIN 100 UNIT/ML
500 SYRINGE INTRAVENOUS AS NEEDED
Status: CANCELLED | OUTPATIENT
Start: 2023-11-01

## 2023-11-01 RX ADMIN — FULVESTRANT 500 MG: 50 INJECTION, SOLUTION INTRAMUSCULAR at 14:36

## 2023-11-01 ASSESSMENT — PAIN SCALES - GENERAL: PAINLEVEL: 8

## 2023-11-07 ENCOUNTER — APPOINTMENT (OUTPATIENT)
Dept: RADIOLOGY | Facility: HOSPITAL | Age: 83
DRG: 640 | End: 2023-11-07
Payer: MEDICARE

## 2023-11-07 ENCOUNTER — HOSPITAL ENCOUNTER (INPATIENT)
Facility: HOSPITAL | Age: 83
LOS: 1 days | Discharge: HOSPICE | DRG: 640 | End: 2023-11-09
Attending: STUDENT IN AN ORGANIZED HEALTH CARE EDUCATION/TRAINING PROGRAM | Admitting: INTERNAL MEDICINE
Payer: MEDICARE

## 2023-11-07 DIAGNOSIS — D69.6 THROMBOCYTOPENIA (CMS-HCC): ICD-10-CM

## 2023-11-07 DIAGNOSIS — D84.9 IMMUNOCOMPROMISED (MULTI): ICD-10-CM

## 2023-11-07 DIAGNOSIS — I50.9 ACUTE ON CHRONIC CONGESTIVE HEART FAILURE, UNSPECIFIED HEART FAILURE TYPE (MULTI): ICD-10-CM

## 2023-11-07 DIAGNOSIS — E83.52 HYPERCALCEMIA: ICD-10-CM

## 2023-11-07 DIAGNOSIS — R93.89 ABNORMAL CT SCAN: ICD-10-CM

## 2023-11-07 DIAGNOSIS — J96.01 ACUTE RESPIRATORY FAILURE WITH HYPOXIA (MULTI): ICD-10-CM

## 2023-11-07 DIAGNOSIS — N83.209 CYST OF OVARY, UNSPECIFIED LATERALITY: ICD-10-CM

## 2023-11-07 DIAGNOSIS — N17.9 AKI (ACUTE KIDNEY INJURY) (CMS-HCC): ICD-10-CM

## 2023-11-07 DIAGNOSIS — R79.0 LOW MAGNESIUM LEVEL: ICD-10-CM

## 2023-11-07 DIAGNOSIS — K76.9 HEPATIC LESION: ICD-10-CM

## 2023-11-07 DIAGNOSIS — R79.89 ELEVATED TROPONIN: ICD-10-CM

## 2023-11-07 DIAGNOSIS — R41.82 ALTERED MENTAL STATUS, UNSPECIFIED ALTERED MENTAL STATUS TYPE: Primary | ICD-10-CM

## 2023-11-07 DIAGNOSIS — E87.6 HYPOKALEMIA: ICD-10-CM

## 2023-11-07 DIAGNOSIS — J18.9 PNEUMONIA DUE TO INFECTIOUS ORGANISM, UNSPECIFIED LATERALITY, UNSPECIFIED PART OF LUNG: ICD-10-CM

## 2023-11-07 LAB
ALBUMIN SERPL BCP-MCNC: 3.1 G/DL (ref 3.4–5)
ALP SERPL-CCNC: 102 U/L (ref 33–136)
ALT SERPL W P-5'-P-CCNC: 49 U/L (ref 7–45)
ANION GAP BLDV CALCULATED.4IONS-SCNC: 5 MMOL/L (ref 10–25)
ANION GAP SERPL CALC-SCNC: 11 MMOL/L (ref 10–20)
APPEARANCE UR: CLEAR
AST SERPL W P-5'-P-CCNC: 43 U/L (ref 9–39)
BASE EXCESS BLDV CALC-SCNC: 8.1 MMOL/L (ref -2–3)
BASOPHILS # BLD AUTO: 0.03 X10*3/UL (ref 0–0.1)
BASOPHILS NFR BLD AUTO: 0.4 %
BILIRUB SERPL-MCNC: 0.5 MG/DL (ref 0–1.2)
BILIRUB UR STRIP.AUTO-MCNC: NEGATIVE MG/DL
BNP SERPL-MCNC: 1248 PG/ML (ref 0–99)
BODY TEMPERATURE: ABNORMAL
BUN SERPL-MCNC: 20 MG/DL (ref 6–23)
CA-I BLDV-SCNC: 2.21 MMOL/L (ref 1.1–1.33)
CALCIUM SERPL-MCNC: 15.4 MG/DL (ref 8.6–10.3)
CARDIAC TROPONIN I PNL SERPL HS: 130 NG/L (ref 0–13)
CARDIAC TROPONIN I PNL SERPL HS: 147 NG/L (ref 0–13)
CHLORIDE BLDV-SCNC: 108 MMOL/L (ref 98–107)
CHLORIDE SERPL-SCNC: 106 MMOL/L (ref 98–107)
CO2 SERPL-SCNC: 30 MMOL/L (ref 21–32)
COLOR UR: YELLOW
CREAT SERPL-MCNC: 1.92 MG/DL (ref 0.5–1.05)
EOSINOPHIL # BLD AUTO: 0.05 X10*3/UL (ref 0–0.4)
EOSINOPHIL NFR BLD AUTO: 0.7 %
ERYTHROCYTE [DISTWIDTH] IN BLOOD BY AUTOMATED COUNT: 15.6 % (ref 11.5–14.5)
GFR SERPL CREATININE-BSD FRML MDRD: 26 ML/MIN/1.73M*2
GLUCOSE BLD MANUAL STRIP-MCNC: 127 MG/DL (ref 74–99)
GLUCOSE BLDV-MCNC: 127 MG/DL (ref 74–99)
GLUCOSE SERPL-MCNC: 112 MG/DL (ref 74–99)
GLUCOSE UR STRIP.AUTO-MCNC: NEGATIVE MG/DL
HCO3 BLDV-SCNC: 32 MMOL/L (ref 22–26)
HCT VFR BLD AUTO: 32 % (ref 36–46)
HCT VFR BLD EST: 31 % (ref 36–46)
HGB BLD-MCNC: 9.8 G/DL (ref 12–16)
HGB BLDV-MCNC: 10.2 G/DL (ref 12–16)
IMM GRANULOCYTES # BLD AUTO: 0.03 X10*3/UL (ref 0–0.5)
IMM GRANULOCYTES NFR BLD AUTO: 0.4 % (ref 0–0.9)
INHALED O2 CONCENTRATION: 0 %
KETONES UR STRIP.AUTO-MCNC: NEGATIVE MG/DL
LACTATE BLDV-SCNC: 1.5 MMOL/L (ref 0.4–2)
LEUKOCYTE ESTERASE UR QL STRIP.AUTO: NEGATIVE
LYMPHOCYTES # BLD AUTO: 0.8 X10*3/UL (ref 0.8–3)
LYMPHOCYTES NFR BLD AUTO: 11.9 %
MAGNESIUM SERPL-MCNC: 1.32 MG/DL (ref 1.6–2.4)
MCH RBC QN AUTO: 29.9 PG (ref 26–34)
MCHC RBC AUTO-ENTMCNC: 30.6 G/DL (ref 32–36)
MCV RBC AUTO: 98 FL (ref 80–100)
MONOCYTES # BLD AUTO: 0.46 X10*3/UL (ref 0.05–0.8)
MONOCYTES NFR BLD AUTO: 6.8 %
NEUTROPHILS # BLD AUTO: 5.36 X10*3/UL (ref 1.6–5.5)
NEUTROPHILS NFR BLD AUTO: 79.8 %
NITRITE UR QL STRIP.AUTO: NEGATIVE
NRBC BLD-RTO: 0 /100 WBCS (ref 0–0)
OXYHGB MFR BLDV: 77.6 % (ref 45–75)
PCO2 BLDV: 41 MM HG (ref 41–51)
PH BLDV: 7.5 PH (ref 7.33–7.43)
PH UR STRIP.AUTO: 6 [PH]
PHOSPHATE SERPL-MCNC: 3.1 MG/DL (ref 2.5–4.9)
PLATELET # BLD AUTO: 120 X10*3/UL (ref 150–450)
PO2 BLDV: 47 MM HG (ref 35–45)
POTASSIUM BLDV-SCNC: 3.2 MMOL/L (ref 3.5–5.3)
POTASSIUM SERPL-SCNC: 3.1 MMOL/L (ref 3.5–5.3)
PROT SERPL-MCNC: 6.1 G/DL (ref 6.4–8.2)
PROT UR STRIP.AUTO-MCNC: NEGATIVE MG/DL
RBC # BLD AUTO: 3.28 X10*6/UL (ref 4–5.2)
RBC # UR STRIP.AUTO: NEGATIVE /UL
RBC MORPH BLD: NORMAL
SAO2 % BLDV: 81 % (ref 45–75)
SODIUM BLDV-SCNC: 142 MMOL/L (ref 136–145)
SODIUM SERPL-SCNC: 144 MMOL/L (ref 136–145)
SP GR UR STRIP.AUTO: 1.01
UROBILINOGEN UR STRIP.AUTO-MCNC: <2 MG/DL
WBC # BLD AUTO: 6.7 X10*3/UL (ref 4.4–11.3)

## 2023-11-07 PROCEDURE — 85025 COMPLETE CBC W/AUTO DIFF WBC: CPT | Performed by: STUDENT IN AN ORGANIZED HEALTH CARE EDUCATION/TRAINING PROGRAM

## 2023-11-07 PROCEDURE — 81003 URINALYSIS AUTO W/O SCOPE: CPT | Performed by: STUDENT IN AN ORGANIZED HEALTH CARE EDUCATION/TRAINING PROGRAM

## 2023-11-07 PROCEDURE — 2500000004 HC RX 250 GENERAL PHARMACY W/ HCPCS (ALT 636 FOR OP/ED): Performed by: STUDENT IN AN ORGANIZED HEALTH CARE EDUCATION/TRAINING PROGRAM

## 2023-11-07 PROCEDURE — 87040 BLOOD CULTURE FOR BACTERIA: CPT | Mod: GEALAB | Performed by: STUDENT IN AN ORGANIZED HEALTH CARE EDUCATION/TRAINING PROGRAM

## 2023-11-07 PROCEDURE — 83880 ASSAY OF NATRIURETIC PEPTIDE: CPT | Performed by: STUDENT IN AN ORGANIZED HEALTH CARE EDUCATION/TRAINING PROGRAM

## 2023-11-07 PROCEDURE — 96367 TX/PROPH/DG ADDL SEQ IV INF: CPT

## 2023-11-07 PROCEDURE — 71045 X-RAY EXAM CHEST 1 VIEW: CPT | Mod: FY,FR

## 2023-11-07 PROCEDURE — 84100 ASSAY OF PHOSPHORUS: CPT | Performed by: STUDENT IN AN ORGANIZED HEALTH CARE EDUCATION/TRAINING PROGRAM

## 2023-11-07 PROCEDURE — 74176 CT ABD & PELVIS W/O CONTRAST: CPT | Mod: FOREIGN READ | Performed by: RADIOLOGY

## 2023-11-07 PROCEDURE — 84484 ASSAY OF TROPONIN QUANT: CPT | Performed by: STUDENT IN AN ORGANIZED HEALTH CARE EDUCATION/TRAINING PROGRAM

## 2023-11-07 PROCEDURE — 71250 CT THORAX DX C-: CPT | Mod: FOREIGN READ | Performed by: RADIOLOGY

## 2023-11-07 PROCEDURE — 36415 COLL VENOUS BLD VENIPUNCTURE: CPT | Performed by: STUDENT IN AN ORGANIZED HEALTH CARE EDUCATION/TRAINING PROGRAM

## 2023-11-07 PROCEDURE — 96372 THER/PROPH/DIAG INJ SC/IM: CPT

## 2023-11-07 PROCEDURE — 84132 ASSAY OF SERUM POTASSIUM: CPT | Performed by: STUDENT IN AN ORGANIZED HEALTH CARE EDUCATION/TRAINING PROGRAM

## 2023-11-07 PROCEDURE — 82330 ASSAY OF CALCIUM: CPT | Performed by: STUDENT IN AN ORGANIZED HEALTH CARE EDUCATION/TRAINING PROGRAM

## 2023-11-07 PROCEDURE — 2500000004 HC RX 250 GENERAL PHARMACY W/ HCPCS (ALT 636 FOR OP/ED)

## 2023-11-07 PROCEDURE — 73630 X-RAY EXAM OF FOOT: CPT | Mod: LT,FY,FR

## 2023-11-07 PROCEDURE — 96366 THER/PROPH/DIAG IV INF ADDON: CPT

## 2023-11-07 PROCEDURE — 71250 CT THORAX DX C-: CPT

## 2023-11-07 PROCEDURE — 74176 CT ABD & PELVIS W/O CONTRAST: CPT | Mod: FR

## 2023-11-07 PROCEDURE — 96365 THER/PROPH/DIAG IV INF INIT: CPT | Mod: 59

## 2023-11-07 PROCEDURE — 70450 CT HEAD/BRAIN W/O DYE: CPT

## 2023-11-07 PROCEDURE — 70450 CT HEAD/BRAIN W/O DYE: CPT | Performed by: RADIOLOGY

## 2023-11-07 PROCEDURE — 71045 X-RAY EXAM CHEST 1 VIEW: CPT | Mod: FOREIGN READ | Performed by: RADIOLOGY

## 2023-11-07 PROCEDURE — 82947 ASSAY GLUCOSE BLOOD QUANT: CPT

## 2023-11-07 PROCEDURE — 73630 X-RAY EXAM OF FOOT: CPT | Mod: LEFT SIDE | Performed by: RADIOLOGY

## 2023-11-07 PROCEDURE — 83735 ASSAY OF MAGNESIUM: CPT | Performed by: STUDENT IN AN ORGANIZED HEALTH CARE EDUCATION/TRAINING PROGRAM

## 2023-11-07 PROCEDURE — 96375 TX/PRO/DX INJ NEW DRUG ADDON: CPT

## 2023-11-07 PROCEDURE — 87186 SC STD MICRODIL/AGAR DIL: CPT | Mod: GEALAB | Performed by: STUDENT IN AN ORGANIZED HEALTH CARE EDUCATION/TRAINING PROGRAM

## 2023-11-07 RX ORDER — ATORVASTATIN CALCIUM 40 MG/1
40 TABLET, FILM COATED ORAL DAILY
COMMUNITY

## 2023-11-07 RX ORDER — ONDANSETRON HYDROCHLORIDE 2 MG/ML
4 INJECTION, SOLUTION INTRAVENOUS ONCE
Status: COMPLETED | OUTPATIENT
Start: 2023-11-07 | End: 2023-11-07

## 2023-11-07 RX ORDER — CALCITONIN SALMON 200 [USP'U]/ML
4 INJECTION, SOLUTION INTRAMUSCULAR; SUBCUTANEOUS 2 TIMES DAILY
Status: DISCONTINUED | OUTPATIENT
Start: 2023-11-07 | End: 2023-11-08

## 2023-11-07 RX ORDER — ONDANSETRON HYDROCHLORIDE 2 MG/ML
INJECTION, SOLUTION INTRAVENOUS
Status: COMPLETED
Start: 2023-11-07 | End: 2023-11-07

## 2023-11-07 RX ORDER — HYDROXYZINE HYDROCHLORIDE 25 MG/1
12.5 TABLET, FILM COATED ORAL EVERY 6 HOURS PRN
COMMUNITY

## 2023-11-07 RX ORDER — POTASSIUM CHLORIDE 14.9 MG/ML
20 INJECTION INTRAVENOUS
Status: COMPLETED | OUTPATIENT
Start: 2023-11-07 | End: 2023-11-08

## 2023-11-07 RX ORDER — MAGNESIUM SULFATE HEPTAHYDRATE 40 MG/ML
2 INJECTION, SOLUTION INTRAVENOUS ONCE
Status: COMPLETED | OUTPATIENT
Start: 2023-11-07 | End: 2023-11-08

## 2023-11-07 RX ORDER — FUROSEMIDE 10 MG/ML
40 INJECTION INTRAMUSCULAR; INTRAVENOUS ONCE
Status: COMPLETED | OUTPATIENT
Start: 2023-11-07 | End: 2023-11-07

## 2023-11-07 RX ORDER — SODIUM CHLORIDE 9 MG/ML
50 INJECTION, SOLUTION INTRAVENOUS CONTINUOUS
Status: DISCONTINUED | OUTPATIENT
Start: 2023-11-07 | End: 2023-11-08

## 2023-11-07 RX ORDER — OMEPRAZOLE 20 MG/1
20 CAPSULE, DELAYED RELEASE ORAL DAILY
COMMUNITY

## 2023-11-07 RX ADMIN — ONDANSETRON 4 MG: 2 INJECTION INTRAMUSCULAR; INTRAVENOUS at 23:18

## 2023-11-07 RX ADMIN — CALCITONIN SALMON 330 UNITS: 200 INJECTION, SOLUTION INTRAMUSCULAR; SUBCUTANEOUS at 22:33

## 2023-11-07 RX ADMIN — SODIUM CHLORIDE 1000 ML: 9 INJECTION, SOLUTION INTRAVENOUS at 21:55

## 2023-11-07 RX ADMIN — POTASSIUM CHLORIDE 20 MEQ: 14.9 INJECTION, SOLUTION INTRAVENOUS at 22:24

## 2023-11-07 RX ADMIN — ONDANSETRON HYDROCHLORIDE 4 MG: 2 INJECTION, SOLUTION INTRAVENOUS at 23:18

## 2023-11-07 RX ADMIN — SODIUM CHLORIDE 50 ML/HR: 9 INJECTION, SOLUTION INTRAVENOUS at 22:19

## 2023-11-07 RX ADMIN — FUROSEMIDE 40 MG: 10 INJECTION, SOLUTION INTRAMUSCULAR; INTRAVENOUS at 22:36

## 2023-11-07 RX ADMIN — MAGNESIUM SULFATE HEPTAHYDRATE 2 G: 2 INJECTION, SOLUTION INTRAVENOUS at 22:27

## 2023-11-07 ASSESSMENT — LIFESTYLE VARIABLES
EVER HAD A DRINK FIRST THING IN THE MORNING TO STEADY YOUR NERVES TO GET RID OF A HANGOVER: NO
HAVE PEOPLE ANNOYED YOU BY CRITICIZING YOUR DRINKING: NO
REASON UNABLE TO ASSESS: YES
REASON UNABLE TO ASSESS: YES
EVER FELT BAD OR GUILTY ABOUT YOUR DRINKING: NO
HAVE YOU EVER FELT YOU SHOULD CUT DOWN ON YOUR DRINKING: NO

## 2023-11-07 ASSESSMENT — PAIN - FUNCTIONAL ASSESSMENT: PAIN_FUNCTIONAL_ASSESSMENT: UNABLE TO SELF-REPORT

## 2023-11-08 PROBLEM — R41.82 ALTERED MENTAL STATUS, UNSPECIFIED ALTERED MENTAL STATUS TYPE: Status: ACTIVE | Noted: 2023-11-08

## 2023-11-08 LAB
ALBUMIN SERPL BCP-MCNC: 3.1 G/DL (ref 3.4–5)
ALBUMIN SERPL BCP-MCNC: 3.2 G/DL (ref 3.4–5)
ANION GAP SERPL CALC-SCNC: 11 MMOL/L (ref 10–20)
ANION GAP SERPL CALC-SCNC: 9 MMOL/L (ref 10–20)
BUN SERPL-MCNC: 21 MG/DL (ref 6–23)
BUN SERPL-MCNC: 21 MG/DL (ref 6–23)
CALCIUM SERPL-MCNC: 14.4 MG/DL (ref 8.6–10.3)
CALCIUM SERPL-MCNC: 14.7 MG/DL (ref 8.6–10.3)
CHLORIDE SERPL-SCNC: 104 MMOL/L (ref 98–107)
CHLORIDE SERPL-SCNC: 104 MMOL/L (ref 98–107)
CO2 SERPL-SCNC: 31 MMOL/L (ref 21–32)
CO2 SERPL-SCNC: 32 MMOL/L (ref 21–32)
CREAT SERPL-MCNC: 1.89 MG/DL (ref 0.5–1.05)
CREAT SERPL-MCNC: 1.91 MG/DL (ref 0.5–1.05)
ERYTHROCYTE [DISTWIDTH] IN BLOOD BY AUTOMATED COUNT: 15.7 % (ref 11.5–14.5)
FLUAV RNA RESP QL NAA+PROBE: NOT DETECTED
FLUBV RNA RESP QL NAA+PROBE: NOT DETECTED
GFR SERPL CREATININE-BSD FRML MDRD: 26 ML/MIN/1.73M*2
GFR SERPL CREATININE-BSD FRML MDRD: 26 ML/MIN/1.73M*2
GLUCOSE SERPL-MCNC: 190 MG/DL (ref 74–99)
GLUCOSE SERPL-MCNC: 213 MG/DL (ref 74–99)
HCT VFR BLD AUTO: 32.1 % (ref 36–46)
HGB BLD-MCNC: 9.6 G/DL (ref 12–16)
HOLD SPECIMEN: NORMAL
LACTATE SERPL-SCNC: 1.6 MMOL/L (ref 0.4–2)
MAGNESIUM SERPL-MCNC: 1.65 MG/DL (ref 1.6–2.4)
MCH RBC QN AUTO: 30 PG (ref 26–34)
MCHC RBC AUTO-ENTMCNC: 29.9 G/DL (ref 32–36)
MCV RBC AUTO: 100 FL (ref 80–100)
MRSA DNA SPEC QL NAA+PROBE: NOT DETECTED
NRBC BLD-RTO: 0 /100 WBCS (ref 0–0)
PHOSPHATE SERPL-MCNC: 4.6 MG/DL (ref 2.5–4.9)
PHOSPHATE SERPL-MCNC: 4.8 MG/DL (ref 2.5–4.9)
PLATELET # BLD AUTO: 126 X10*3/UL (ref 150–450)
POTASSIUM SERPL-SCNC: 3.6 MMOL/L (ref 3.5–5.3)
POTASSIUM SERPL-SCNC: 3.7 MMOL/L (ref 3.5–5.3)
PROCALCITONIN SERPL-MCNC: 0.17 NG/ML
RBC # BLD AUTO: 3.2 X10*6/UL (ref 4–5.2)
SARS-COV-2 RNA RESP QL NAA+PROBE: NOT DETECTED
SODIUM SERPL-SCNC: 141 MMOL/L (ref 136–145)
SODIUM SERPL-SCNC: 142 MMOL/L (ref 136–145)
WBC # BLD AUTO: 9 X10*3/UL (ref 4.4–11.3)

## 2023-11-08 PROCEDURE — 82397 CHEMILUMINESCENT ASSAY: CPT | Performed by: INTERNAL MEDICINE

## 2023-11-08 PROCEDURE — 2500000004 HC RX 250 GENERAL PHARMACY W/ HCPCS (ALT 636 FOR OP/ED): Performed by: STUDENT IN AN ORGANIZED HEALTH CARE EDUCATION/TRAINING PROGRAM

## 2023-11-08 PROCEDURE — 84145 PROCALCITONIN (PCT): CPT | Mod: GEALAB | Performed by: INTERNAL MEDICINE

## 2023-11-08 PROCEDURE — 99222 1ST HOSP IP/OBS MODERATE 55: CPT

## 2023-11-08 PROCEDURE — 2500000004 HC RX 250 GENERAL PHARMACY W/ HCPCS (ALT 636 FOR OP/ED): Performed by: NURSE PRACTITIONER

## 2023-11-08 PROCEDURE — 99223 1ST HOSP IP/OBS HIGH 75: CPT

## 2023-11-08 PROCEDURE — 85027 COMPLETE CBC AUTOMATED: CPT

## 2023-11-08 PROCEDURE — 2500000001 HC RX 250 WO HCPCS SELF ADMINISTERED DRUGS (ALT 637 FOR MEDICARE OP): Performed by: NURSE PRACTITIONER

## 2023-11-08 PROCEDURE — 87641 MR-STAPH DNA AMP PROBE: CPT | Performed by: STUDENT IN AN ORGANIZED HEALTH CARE EDUCATION/TRAINING PROGRAM

## 2023-11-08 PROCEDURE — 87636 SARSCOV2 & INF A&B AMP PRB: CPT | Performed by: STUDENT IN AN ORGANIZED HEALTH CARE EDUCATION/TRAINING PROGRAM

## 2023-11-08 PROCEDURE — 84100 ASSAY OF PHOSPHORUS: CPT

## 2023-11-08 PROCEDURE — A4217 STERILE WATER/SALINE, 500 ML: HCPCS | Performed by: STUDENT IN AN ORGANIZED HEALTH CARE EDUCATION/TRAINING PROGRAM

## 2023-11-08 PROCEDURE — 80069 RENAL FUNCTION PANEL: CPT

## 2023-11-08 PROCEDURE — 83605 ASSAY OF LACTIC ACID: CPT | Performed by: STUDENT IN AN ORGANIZED HEALTH CARE EDUCATION/TRAINING PROGRAM

## 2023-11-08 PROCEDURE — 87640 STAPH A DNA AMP PROBE: CPT | Performed by: STUDENT IN AN ORGANIZED HEALTH CARE EDUCATION/TRAINING PROGRAM

## 2023-11-08 PROCEDURE — 1100000001 HC PRIVATE ROOM DAILY

## 2023-11-08 PROCEDURE — 83735 ASSAY OF MAGNESIUM: CPT

## 2023-11-08 PROCEDURE — 99291 CRITICAL CARE FIRST HOUR: CPT | Mod: 25 | Performed by: STUDENT IN AN ORGANIZED HEALTH CARE EDUCATION/TRAINING PROGRAM

## 2023-11-08 PROCEDURE — 37799 UNLISTED PX VASCULAR SURGERY: CPT

## 2023-11-08 PROCEDURE — 99222 1ST HOSP IP/OBS MODERATE 55: CPT | Performed by: NURSE PRACTITIONER

## 2023-11-08 RX ORDER — LORAZEPAM 2 MG/ML
2 INJECTION INTRAMUSCULAR EVERY 10 MIN PRN
Status: DISCONTINUED | OUTPATIENT
Start: 2023-11-08 | End: 2023-11-09 | Stop reason: HOSPADM

## 2023-11-08 RX ORDER — DEXTROSE 50 % IN WATER (D50W) INTRAVENOUS SYRINGE
25
Status: DISCONTINUED | OUTPATIENT
Start: 2023-11-08 | End: 2023-11-08

## 2023-11-08 RX ORDER — GLYCOPYRROLATE 0.2 MG/ML
0.4 INJECTION INTRAMUSCULAR; INTRAVENOUS EVERY 4 HOURS PRN
Status: DISCONTINUED | OUTPATIENT
Start: 2023-11-08 | End: 2023-11-09 | Stop reason: HOSPADM

## 2023-11-08 RX ORDER — HALOPERIDOL 5 MG/ML
1 INJECTION INTRAMUSCULAR EVERY 4 HOURS PRN
Status: DISCONTINUED | OUTPATIENT
Start: 2023-11-08 | End: 2023-11-09 | Stop reason: HOSPADM

## 2023-11-08 RX ORDER — ACETAMINOPHEN 650 MG/1
650 SUPPOSITORY RECTAL EVERY 4 HOURS PRN
Status: DISCONTINUED | OUTPATIENT
Start: 2023-11-08 | End: 2023-11-09 | Stop reason: HOSPADM

## 2023-11-08 RX ORDER — LORAZEPAM 2 MG/ML
0.5 INJECTION INTRAMUSCULAR ONCE
Status: COMPLETED | OUTPATIENT
Start: 2023-11-08 | End: 2023-11-08

## 2023-11-08 RX ORDER — GLYCOPYRROLATE 0.2 MG/ML
0.2 INJECTION INTRAMUSCULAR; INTRAVENOUS EVERY 4 HOURS PRN
Status: DISCONTINUED | OUTPATIENT
Start: 2023-11-08 | End: 2023-11-08

## 2023-11-08 RX ORDER — BISACODYL 10 MG/1
10 SUPPOSITORY RECTAL DAILY PRN
Status: DISCONTINUED | OUTPATIENT
Start: 2023-11-08 | End: 2023-11-09 | Stop reason: HOSPADM

## 2023-11-08 RX ORDER — LORAZEPAM 2 MG/ML
0.5 INJECTION INTRAMUSCULAR EVERY 4 HOURS PRN
Status: DISCONTINUED | OUTPATIENT
Start: 2023-11-08 | End: 2023-11-09 | Stop reason: HOSPADM

## 2023-11-08 RX ORDER — DEXTROSE MONOHYDRATE 100 MG/ML
0.3 INJECTION, SOLUTION INTRAVENOUS ONCE AS NEEDED
Status: DISCONTINUED | OUTPATIENT
Start: 2023-11-08 | End: 2023-11-08

## 2023-11-08 RX ORDER — MORPHINE SULFATE 2 MG/ML
1 INJECTION, SOLUTION INTRAMUSCULAR; INTRAVENOUS EVERY 4 HOURS PRN
Status: DISCONTINUED | OUTPATIENT
Start: 2023-11-08 | End: 2023-11-08

## 2023-11-08 RX ORDER — INSULIN LISPRO 100 [IU]/ML
0-5 INJECTION, SOLUTION INTRAVENOUS; SUBCUTANEOUS
Status: DISCONTINUED | OUTPATIENT
Start: 2023-11-08 | End: 2023-11-08

## 2023-11-08 RX ORDER — HYDRALAZINE HYDROCHLORIDE 20 MG/ML
5 INJECTION INTRAMUSCULAR; INTRAVENOUS EVERY 8 HOURS PRN
Status: DISCONTINUED | OUTPATIENT
Start: 2023-11-08 | End: 2023-11-08

## 2023-11-08 RX ADMIN — HYDROMORPHONE HYDROCHLORIDE 0.5 MG: 1 INJECTION, SOLUTION INTRAMUSCULAR; INTRAVENOUS; SUBCUTANEOUS at 18:43

## 2023-11-08 RX ADMIN — POTASSIUM CHLORIDE 20 MEQ: 14.9 INJECTION, SOLUTION INTRAVENOUS at 00:38

## 2023-11-08 RX ADMIN — HYDROMORPHONE HYDROCHLORIDE 0.5 MG: 1 INJECTION, SOLUTION INTRAMUSCULAR; INTRAVENOUS; SUBCUTANEOUS at 22:25

## 2023-11-08 RX ADMIN — HYDROMORPHONE HYDROCHLORIDE 0.4 MG: 1 INJECTION, SOLUTION INTRAMUSCULAR; INTRAVENOUS; SUBCUTANEOUS at 14:00

## 2023-11-08 RX ADMIN — LORAZEPAM 0.5 MG: 2 INJECTION INTRAMUSCULAR; INTRAVENOUS at 13:48

## 2023-11-08 RX ADMIN — VANCOMYCIN HYDROCHLORIDE 1.5 G: 10 INJECTION, POWDER, LYOPHILIZED, FOR SOLUTION INTRAVENOUS at 01:16

## 2023-11-08 RX ADMIN — GLYCOPYRROLATE 0.2 MG: 0.2 INJECTION, SOLUTION INTRAMUSCULAR; INTRAVENOUS at 12:53

## 2023-11-08 RX ADMIN — GLYCOPYRROLATE 0.4 MG: 0.2 INJECTION, SOLUTION INTRAMUSCULAR; INTRAVENOUS at 17:02

## 2023-11-08 RX ADMIN — LORAZEPAM 0.5 MG: 2 INJECTION INTRAMUSCULAR; INTRAVENOUS at 09:53

## 2023-11-08 RX ADMIN — PIPERACILLIN SODIUM AND TAZOBACTAM SODIUM 4.5 G: 4; .5 INJECTION, SOLUTION INTRAVENOUS at 00:38

## 2023-11-08 RX ADMIN — HYDROMORPHONE HYDROCHLORIDE 0.2 MG: 1 INJECTION, SOLUTION INTRAMUSCULAR; INTRAVENOUS; SUBCUTANEOUS at 12:15

## 2023-11-08 RX ADMIN — HYDROMORPHONE HYDROCHLORIDE 0.5 MG: 1 INJECTION, SOLUTION INTRAMUSCULAR; INTRAVENOUS; SUBCUTANEOUS at 20:37

## 2023-11-08 RX ADMIN — HYDROMORPHONE HYDROCHLORIDE 0.5 MG: 1 INJECTION, SOLUTION INTRAMUSCULAR; INTRAVENOUS; SUBCUTANEOUS at 09:54

## 2023-11-08 RX ADMIN — CARBOXYMETHYLCELLULOSE SODIUM 1 DROP: 5 SOLUTION/ DROPS OPHTHALMIC at 18:52

## 2023-11-08 RX ADMIN — CARBOXYMETHYLCELLULOSE SODIUM 1 DROP: 5 SOLUTION/ DROPS OPHTHALMIC at 14:24

## 2023-11-08 SDOH — ECONOMIC STABILITY: TRANSPORTATION INSECURITY
IN THE PAST 12 MONTHS, HAS THE LACK OF TRANSPORTATION KEPT YOU FROM MEDICAL APPOINTMENTS OR FROM GETTING MEDICATIONS?: PATIENT DECLINED

## 2023-11-08 SDOH — HEALTH STABILITY: MENTAL HEALTH: HOW MANY STANDARD DRINKS CONTAINING ALCOHOL DO YOU HAVE ON A TYPICAL DAY?: PATIENT DECLINED

## 2023-11-08 SDOH — ECONOMIC STABILITY: HOUSING INSECURITY

## 2023-11-08 SDOH — SOCIAL STABILITY: SOCIAL INSECURITY: HAVE YOU HAD THOUGHTS OF HARMING ANYONE ELSE?: UNABLE TO ASSESS

## 2023-11-08 SDOH — ECONOMIC STABILITY: TRANSPORTATION INSECURITY
IN THE PAST 12 MONTHS, HAS LACK OF TRANSPORTATION KEPT YOU FROM MEETINGS, WORK, OR FROM GETTING THINGS NEEDED FOR DAILY LIVING?: PATIENT DECLINED

## 2023-11-08 SDOH — HEALTH STABILITY: MENTAL HEALTH: HOW OFTEN DO YOU HAVE A DRINK CONTAINING ALCOHOL?: PATIENT DECLINED

## 2023-11-08 SDOH — HEALTH STABILITY: PHYSICAL HEALTH
ON AVERAGE, HOW MANY DAYS PER WEEK DO YOU ENGAGE IN MODERATE TO STRENUOUS EXERCISE (LIKE A BRISK WALK)?: PATIENT DECLINED

## 2023-11-08 SDOH — ECONOMIC STABILITY: INCOME INSECURITY

## 2023-11-08 SDOH — ECONOMIC STABILITY: INCOME INSECURITY: IN THE LAST 12 MONTHS, WAS THERE A TIME WHEN YOU WERE NOT ABLE TO PAY THE MORTGAGE OR RENT ON TIME?: PATIENT REFUSED

## 2023-11-08 SDOH — SOCIAL STABILITY: SOCIAL INSECURITY: ARE THERE ANY APPARENT SIGNS OF INJURIES/BEHAVIORS THAT COULD BE RELATED TO ABUSE/NEGLECT?: UNABLE TO ASSESS

## 2023-11-08 SDOH — ECONOMIC STABILITY: HOUSING INSECURITY: IN THE LAST 12 MONTHS, HOW MANY PLACES HAVE YOU LIVED?: 1

## 2023-11-08 SDOH — SOCIAL STABILITY: SOCIAL INSECURITY: DOES ANYONE TRY TO KEEP YOU FROM HAVING/CONTACTING OTHER FRIENDS OR DOING THINGS OUTSIDE YOUR HOME?: UNABLE TO ASSESS

## 2023-11-08 SDOH — ECONOMIC STABILITY: INCOME INSECURITY
IN THE PAST 12 MONTHS, HAS THE ELECTRIC, GAS, OIL, OR WATER COMPANY THREATENED TO SHUT OFF SERVICE IN YOUR HOME?: PATIENT REFUSED

## 2023-11-08 SDOH — ECONOMIC STABILITY: TRANSPORTATION INSECURITY

## 2023-11-08 SDOH — ECONOMIC STABILITY: HOUSING INSECURITY
IN THE LAST 12 MONTHS, WAS THERE A TIME WHEN YOU DID NOT HAVE A STEADY PLACE TO SLEEP OR SLEPT IN A SHELTER (INCLUDING NOW)?: PATIENT REFUSED

## 2023-11-08 SDOH — SOCIAL STABILITY: SOCIAL INSECURITY: WERE YOU ABLE TO COMPLETE ALL THE BEHAVIORAL HEALTH SCREENINGS?: NO

## 2023-11-08 SDOH — HEALTH STABILITY: PHYSICAL HEALTH: ON AVERAGE, HOW MANY MINUTES DO YOU ENGAGE IN EXERCISE AT THIS LEVEL?: PATIENT DECLINED

## 2023-11-08 SDOH — HEALTH STABILITY: PHYSICAL HEALTH

## 2023-11-08 SDOH — HEALTH STABILITY: MENTAL HEALTH

## 2023-11-08 SDOH — SOCIAL STABILITY: SOCIAL INSECURITY: DO YOU FEEL UNSAFE GOING BACK TO THE PLACE WHERE YOU ARE LIVING?: UNABLE TO ASSESS

## 2023-11-08 SDOH — SOCIAL STABILITY: SOCIAL INSECURITY: ARE YOU OR HAVE YOU BEEN THREATENED OR ABUSED PHYSICALLY, EMOTIONALLY, OR SEXUALLY BY ANYONE?: UNABLE TO ASSESS

## 2023-11-08 SDOH — SOCIAL STABILITY: SOCIAL INSECURITY: HAS ANYONE EVER THREATENED TO HURT YOUR FAMILY OR YOUR PETS?: UNABLE TO ASSESS

## 2023-11-08 SDOH — SOCIAL STABILITY: SOCIAL INSECURITY: ABUSE: ADULT

## 2023-11-08 SDOH — ECONOMIC STABILITY: INCOME INSECURITY: HOW HARD IS IT FOR YOU TO PAY FOR THE VERY BASICS LIKE FOOD, HOUSING, MEDICAL CARE, AND HEATING?: PATIENT DECLINED

## 2023-11-08 SDOH — HEALTH STABILITY: MENTAL HEALTH: HOW OFTEN DO YOU HAVE 6 OR MORE DRINKS ON ONE OCCASION?: PATIENT DECLINED

## 2023-11-08 SDOH — SOCIAL STABILITY: SOCIAL INSECURITY: DO YOU FEEL ANYONE HAS EXPLOITED OR TAKEN ADVANTAGE OF YOU FINANCIALLY OR OF YOUR PERSONAL PROPERTY?: UNABLE TO ASSESS

## 2023-11-08 ASSESSMENT — ACTIVITIES OF DAILY LIVING (ADL)
GROOMING: UNABLE TO ASSESS
ADEQUATE_TO_COMPLETE_ADL: UNABLE TO ASSESS
HEARING - LEFT EAR: UNABLE TO ASSESS
HEARING - RIGHT EAR: UNABLE TO ASSESS
FEEDING YOURSELF: UNABLE TO ASSESS
BATHING: UNABLE TO ASSESS
GROOMING: UNABLE TO ASSESS
PATIENT'S MEMORY ADEQUATE TO SAFELY COMPLETE DAILY ACTIVITIES?: UNABLE TO ASSESS
TOILETING: UNABLE TO ASSESS
ASSISTIVE_DEVICE: EYEGLASSES
BATHING: UNABLE TO ASSESS
HEARING - LEFT EAR: UNABLE TO ASSESS
JUDGMENT_ADEQUATE_SAFELY_COMPLETE_DAILY_ACTIVITIES: UNABLE TO ASSESS
TOILETING: UNABLE TO ASSESS
HEARING - RIGHT EAR: UNABLE TO ASSESS
LACK_OF_TRANSPORTATION: PATIENT DECLINED
FEEDING YOURSELF: UNABLE TO ASSESS
WALKS IN HOME: DEPENDENT
DRESSING YOURSELF: UNABLE TO ASSESS
WALKS IN HOME: UNABLE TO ASSESS
JUDGMENT_ADEQUATE_SAFELY_COMPLETE_DAILY_ACTIVITIES: UNABLE TO ASSESS
ASSISTIVE_DEVICE: EYEGLASSES
ADEQUATE_TO_COMPLETE_ADL: UNABLE TO ASSESS
DRESSING YOURSELF: UNABLE TO ASSESS
PATIENT'S MEMORY ADEQUATE TO SAFELY COMPLETE DAILY ACTIVITIES?: UNABLE TO ASSESS

## 2023-11-08 ASSESSMENT — PAIN - FUNCTIONAL ASSESSMENT
PAIN_FUNCTIONAL_ASSESSMENT: CPOT (CRITICAL CARE PAIN OBSERVATION TOOL)
PAIN_FUNCTIONAL_ASSESSMENT: PAINAD (PAIN ASSESSMENT IN ADVANCED DEMENTIA SCALE)
PAIN_FUNCTIONAL_ASSESSMENT: PAINAD (PAIN ASSESSMENT IN ADVANCED DEMENTIA SCALE)
PAIN_FUNCTIONAL_ASSESSMENT: CPOT (CRITICAL CARE PAIN OBSERVATION TOOL)
PAIN_FUNCTIONAL_ASSESSMENT: PAINAD (PAIN ASSESSMENT IN ADVANCED DEMENTIA SCALE)
PAIN_FUNCTIONAL_ASSESSMENT: PAINAD (PAIN ASSESSMENT IN ADVANCED DEMENTIA SCALE)

## 2023-11-08 ASSESSMENT — PAIN SCALES - PAIN ASSESSMENT IN ADVANCED DEMENTIA (PAINAD)
CONSOLABILITY: DISTRACTED OR REASSURED BY VOICE/TOUCH
CONSOLABILITY: DISTRACTED OR REASSURED BY VOICE/TOUCH
NEGVOCALIZATION: OCCASIONAL MOAN/GROAN, LOW SPEECH, NEGATIVE/DISAPPROVING QUALITY
BODYLANGUAGE: TENSE, DISTRESSED PACING, FIDGETING
TOTALSCORE: 5
TOTALSCORE: MEDICATION (SEE MAR)
NEGVOCALIZATION: OCCASIONAL MOAN/GROAN, LOW SPEECH, NEGATIVE/DISAPPROVING QUALITY
TOTALSCORE: 7
FACIALEXPRESSION: FACIAL GRIMACING
CONSOLABILITY: DISTRACTED OR REASSURED BY VOICE/TOUCH
BREATHING: NOISY LABORED BREATHING, LONG PERIODS OF HYPERVENTILATION, CHEYNE-STOKES RESPIRATIONS
TOTALSCORE: MEDICATION (SEE MAR)
FACIALEXPRESSION: SAD, FRIGHTENED, FROWN
CONSOLABILITY: DISTRACTED OR REASSURED BY VOICE/TOUCH
CONSOLABILITY: DISTRACTED OR REASSURED BY VOICE/TOUCH
BREATHING: NOISY LABORED BREATHING, LONG PERIODS OF HYPERVENTILATION, CHEYNE-STOKES RESPIRATIONS
BODYLANGUAGE: TENSE, DISTRESSED PACING, FIDGETING
TOTALSCORE: 7
FACIALEXPRESSION: SMILING OR INEXPRESSIVE
TOTALSCORE: MEDICATION (SEE MAR)
NEGVOCALIZATION: OCCASIONAL MOAN/GROAN, LOW SPEECH, NEGATIVE/DISAPPROVING QUALITY
BODYLANGUAGE: TENSE, DISTRESSED PACING, FIDGETING
BREATHING: NOISY LABORED BREATHING, LONG PERIODS OF HYPERVENTILATION, CHEYNE-STOKES RESPIRATIONS
TOTALSCORE: 7
NEGVOCALIZATION: OCCASIONAL MOAN/GROAN, LOW SPEECH, NEGATIVE/DISAPPROVING QUALITY
FACIALEXPRESSION: FACIAL GRIMACING
NEGVOCALIZATION: OCCASIONAL MOAN/GROAN, LOW SPEECH, NEGATIVE/DISAPPROVING QUALITY
TOTALSCORE: MD NOTIFIED (COMMENT)
BODYLANGUAGE: RIGID, FISTS CLENCHED, KNEES UP, PUSHING/PULLING AWAY, STRIKES OUT
BREATHING: NOISY LABORED BREATHING, LONG PERIODS OF HYPERVENTILATION, CHEYNE-STOKES RESPIRATIONS
BODYLANGUAGE: TENSE, DISTRESSED PACING, FIDGETING
FACIALEXPRESSION: FACIAL GRIMACING
TOTALSCORE: 7
TOTALSCORE: MEDICATION (SEE MAR)
BREATHING: NOISY LABORED BREATHING, LONG PERIODS OF HYPERVENTILATION, CHEYNE-STOKES RESPIRATIONS

## 2023-11-08 ASSESSMENT — COGNITIVE AND FUNCTIONAL STATUS - GENERAL
DRESSING REGULAR UPPER BODY CLOTHING: TOTAL
WALKING IN HOSPITAL ROOM: TOTAL
STANDING UP FROM CHAIR USING ARMS: TOTAL
MOVING TO AND FROM BED TO CHAIR: TOTAL
DAILY ACTIVITIY SCORE: 6
TURNING FROM BACK TO SIDE WHILE IN FLAT BAD: TOTAL
HELP NEEDED FOR BATHING: TOTAL
MOBILITY SCORE: 6
DRESSING REGULAR LOWER BODY CLOTHING: TOTAL
WALKING IN HOSPITAL ROOM: TOTAL
MOVING FROM LYING ON BACK TO SITTING ON SIDE OF FLAT BED WITH BEDRAILS: TOTAL
CLIMB 3 TO 5 STEPS WITH RAILING: TOTAL
DRESSING REGULAR UPPER BODY CLOTHING: TOTAL
MOVING FROM LYING ON BACK TO SITTING ON SIDE OF FLAT BED WITH BEDRAILS: TOTAL
DAILY ACTIVITIY SCORE: 6
MOBILITY SCORE: 6
PERSONAL GROOMING: TOTAL
CLIMB 3 TO 5 STEPS WITH RAILING: TOTAL
TOILETING: TOTAL
PATIENT BASELINE BEDBOUND: UNABLE TO ASSESS AT THIS TIME
PERSONAL GROOMING: TOTAL
STANDING UP FROM CHAIR USING ARMS: TOTAL
MOVING TO AND FROM BED TO CHAIR: TOTAL
DRESSING REGULAR LOWER BODY CLOTHING: TOTAL
EATING MEALS: TOTAL
HELP NEEDED FOR BATHING: TOTAL
TURNING FROM BACK TO SIDE WHILE IN FLAT BAD: TOTAL
TOILETING: TOTAL
EATING MEALS: TOTAL

## 2023-11-08 ASSESSMENT — PATIENT HEALTH QUESTIONNAIRE - PHQ9
1. LITTLE INTEREST OR PLEASURE IN DOING THINGS: NOT AT ALL
2. FEELING DOWN, DEPRESSED OR HOPELESS: NOT AT ALL
SUM OF ALL RESPONSES TO PHQ9 QUESTIONS 1 & 2: 0

## 2023-11-08 ASSESSMENT — LIFESTYLE VARIABLES
AUDIT-C TOTAL SCORE: -1
SKIP TO QUESTIONS 9-10: 0
AUDIT-C TOTAL SCORE: -1
SKIP TO QUESTIONS 9-10: 0
AUDIT-C TOTAL SCORE: -1
HOW OFTEN DO YOU HAVE 6 OR MORE DRINKS ON ONE OCCASION: PATIENT DECLINED
HOW OFTEN DO YOU HAVE A DRINK CONTAINING ALCOHOL: PATIENT DECLINED
HOW MANY STANDARD DRINKS CONTAINING ALCOHOL DO YOU HAVE ON A TYPICAL DAY: PATIENT DECLINED

## 2023-11-08 ASSESSMENT — COLUMBIA-SUICIDE SEVERITY RATING SCALE - C-SSRS
6. HAVE YOU EVER DONE ANYTHING, STARTED TO DO ANYTHING, OR PREPARED TO DO ANYTHING TO END YOUR LIFE?: NO
1. IN THE PAST MONTH, HAVE YOU WISHED YOU WERE DEAD OR WISHED YOU COULD GO TO SLEEP AND NOT WAKE UP?: NO
2. HAVE YOU ACTUALLY HAD ANY THOUGHTS OF KILLING YOURSELF?: NO

## 2023-11-08 NOTE — CARE PLAN
Per documentation: Rene Hogan, A 83 y.o female with past medical history of recently admitted for acute hypoxic respiratory failure 2/2 Covid, Diabetic foot infection 2/2 Pseudomonas and MRSA, Breast cancer with Bone metastasis Left foot osteomyelitis s/p transmetatarsal infection,  Recurrent Fall, generalized weakness brought to the emergency room for hypercalcemia and altered mental status.  History was obtain from patient's nurse as patient was unresponsive on examination. Patient nurse states that patient was hypoxic yesterday requiring 8 liters of oxygen. Baseline oxygen is 3 liters. She states that patient is normally alert and oriented X3 but within 24-48 hours. She has noticed decline in mentation. The patient was admitted to the ICU for further treatment. The admitting provider spoke with the patient's  and he decided to change the code status to DNRCC only, so she was transferred to the floor.     She is unresponsive with long periods of apnea at this time. Family informed that she likely has hours before passing away. She may pass away before hospice can come evaluate the patient, so a consult was added for bereavement resources. Palliative care also at the bedside with Dr. Avilez to discuss the patient's prognosis. Medications were added to keep the patient comfortable.

## 2023-11-08 NOTE — ED PROCEDURE NOTE
Procedure  Critical Care    Performed by: Domenico Vergara DO  Authorized by: Domenico Vergara DO    Critical care provider statement:     Critical care time (minutes):  31    Critical care time was exclusive of:  Separately billable procedures and treating other patients and teaching time    Critical care was necessary to treat or prevent imminent or life-threatening deterioration of the following conditions:  Cardiac failure, respiratory failure, sepsis and metabolic crisis    Critical care was time spent personally by me on the following activities:  Ordering and performing treatments and interventions, ordering and review of laboratory studies, ordering and review of radiographic studies, pulse oximetry, re-evaluation of patient's condition, review of old charts, examination of patient, evaluation of patient's response to treatment and discussions with consultants    Care discussed with: admitting provider                 Domenico Vergara DO  11/08/23 0127

## 2023-11-08 NOTE — CARE PLAN
Palliative Care Social Work Note     Referral placed to [facility] via [facility] for bereavement services for pt's son Jose A.

## 2023-11-08 NOTE — PROGRESS NOTES
Pharmacy Medication History Review    Samira López is a 83 y.o. female admitted for No Principal Problem: There is no principal problem currently on the Problem List. Please update the Problem List and refresh.. Pharmacy reviewed the patient's [id number] medications and allergies for accuracy.    The list below reflectives the updated PTA list. Please review each medication in order reconciliation for additional clarification and justification.  (Not in a hospital admission)       The list below reflectives the updated allergy list. Please review each documented allergy for additional clarification and justification.  Allergies  Reviewed by Chelsea Fields CPhT on 11/7/2023        Severity Reactions Comments    Cephalosporins High Hives     Ciprofloxacin Not Specified Hives     Codeine Not Specified Unknown     Epoetin Josue Not Specified Unknown             Below are additional concerns with the patient's PTA list.      Chelsea Fields CPhT

## 2023-11-08 NOTE — NURSING NOTE
Pt transferred to [address] [place] [address] from ICU report received, pt on nonrebreather, A+O x 0, pupils pin point and fixed, nonresponsive, pt situated in bed, safety precautions in place

## 2023-11-08 NOTE — ED PROVIDER NOTES
"CC: Altered Mental Status     HPI:  83-year-old female presents to the emergency department from SNF with concern for hypercalcemia and altered mental status.  Patient with a history of breast cancer with bony metastasis.  Was recently admitted to [facility] for acute on chronic respiratory failure secondary to COVID infection, discharged back to SNF on 2 L nasal cannula.  Also with recent osteomyelitis of the left foot, completed antibiotics for cultures growing Pseudomonas and MRSA with wound care by podiatry.    Apparently had calcium of 15 at SNF today, also hypoxic to 80s on her home 2 L nasal cannula requiring escalation to 5 L nasal cannula.    Patient is profoundly confused and delirious on arrival, unable to answer questions, oriented x0, repeatedly asking \"who are you\" and picking at clothing.    Records Reviewed:  Recent available ED and inpatient notes reviewed in EMR.    PMHx/PSHx:  Per HPI.   - has a past surgical history that includes Cholecystectomy (06/12/2013); Other surgical history (06/17/2020); Foot surgery (06/29/2017); CT angio aorta and bilateral iliofemoral runoff w and or wo IV contrast (4/25/2020); CT guided percutaneous biopsy bone deep (12/13/2018); CT angio aorta and bilateral iliofemoral runoff w and or wo IV contrast (9/8/2022); and CT angio aorta and bilateral iliofemoral runoff w and or wo IV contrast (5/17/2023).  - has Arthritis, multiple joint involvement; Anemia, macrocytic; Balance disorder; Benign essential hypertension; Breast cancer metastasized to bone (CMS/HCC); Carcinoma of left breast metastatic to axillary lymph node (CMS/HCC); Cervical spinal stenosis; Lumbar canal stenosis; Chronic diastolic congestive heart failure (CMS/HCC); Chronic venous insufficiency; Critical lower limb ischemia (CMS/HCC); Edema leg; Esophageal reflux; Dermatitis; Hyperlipidemia; Ischemic ulcer of foot due to atherosclerosis of lower extremity (CMS/HCC); Mild stage glaucoma; Obstructive " sleep apnea, adult; Peripheral polyneuropathy; Peripheral vascular disease (CMS/HCC); Xerosis of skin; Status post left breast lumpectomy; Sciatica of left side; Pseudophakia, both eyes; Anemia due to chronic kidney disease; BMI 30.0-30.9,adult; Cellulitis and abscess of lower extremity; Cellulitis of left lower limb; Chronic diastolic (congestive) heart failure (CMS/HCC); Stage 3 chronic kidney disease (CMS/HCC); Cutaneous abscess of left lower limb; History of falling; Hypertensive heart and chronic kidney disease with heart failure and stage 1 through stage 4 chronic kidney disease, or chronic kidney disease (CMS/HCC); Hyperlipidemia, unspecified; Lobular carcinoma in situ of left breast; Long term (current) use of anticoagulants; Malignant neoplasm of unspecified site of unspecified female breast (CMS/[facility]); Muscle weakness; Nutritional anemia; Obesity; NATALIYA (obstructive sleep apnea); Open wound of knee, leg (except thigh), and ankle; Other pulmonary embolism without acute cor pulmonale (CMS/[facility]); Other specified postprocedural states; Palliative care status; Personal history of nicotine dependence; Primary generalized (osteo)arthritis; SCC (squamous cell carcinoma); Sciatica, left side; 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); Secondary malignant neoplasm of bone (CMS/HCC); 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); Type 2 DM with diabetic peripheral angiopathy w/o gangrene (CMS/[facility]); Glaucoma; Abnormal computed tomography scan; Accidental fall; Anxiety; Asthenia; Blister of ankle; Blister of leg; Gangrene (CMS/HCC); Candidal intertrigo; Cellulitis; Chronic obstructive lung disease (CMS/HCC); Chronic respiratory failure with hypoxia (CMS/HCC); Cold feeling; Diabetes mellitus type 2 in obese (CMS/HCC); Type 2 diabetes mellitus with ulcer (CMS/HCC); Foot pain; Hip pain; Pain in toe; Gangrene of foot (CMS/HCC); History of malignant  neoplasm of breast; History of pulmonary embolism; Hyperkalemia; Hypoxia; Limb ischemia; Pain of left lower leg; Primary open angle glaucoma (POAG) of both eyes, mild stage; Puncture wound without foreign body, left foot, initial encounter; Puncture wound without foreign body, right lower leg, subsequent encounter; Toxic metabolic encephalopathy; Venous stasis; Sepsis (CMS/HCC); Edema of lower extremity; Glaucoma suspect, both eyes; Hypotension; Low blood pressure; Peripheral arterial disease (CMS/HCC); Non-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 foot with fat layer exposed (CMS/HCC); Asteatosis cutis; Pancytopenia (CMS/HCC); BMI 29.0-29.9,adult; Class 1 obesity due to excess calories with body mass index (BMI) of 31.0 to 31.9 in adult; Primary open angle glaucoma (POAG) of both eyes, moderate stage; Ulcer of left foot (CMS/HCC); COVID-19; Ankle ulcer, left, with unspecified severity (CMS/[facility]); and Decubitus ulcer of left heel on their problem list.    Medications:  Reviewed in EMR. See EMR for complete list of medications and doses.    Allergies:  Cephalosporins, Ciprofloxacin, Codeine, and Epoetin shankar    Social History:  - Tobacco:  reports that she quit smoking about 23 years ago. Her smoking use included cigarettes. She has a 22.50 pack-year smoking history. She has never used smokeless tobacco.   - Alcohol:  reports current alcohol use.   - Illicit Drugs:  reports that she does not currently use drugs.     ROS:  Per HPI.       ???????????????????????????????????????????????????????????????  Triage Vitals:  T 36.5 °C (97.7 °F)  HR 77  /65  RR 20  O2 (!) 68 % None (Room air)    Physical Exam  Constitutional:       General: She is not in acute distress.  HENT:      Head: Atraumatic.   Cardiovascular:      Rate and Rhythm: Regular rhythm.      Heart sounds: No murmur heard.     No friction rub. No gallop.   Pulmonary:      Effort: No respiratory distress.      Breath sounds: Normal breath sounds. No  "wheezing or rales.   Chest:      Chest wall: No tenderness.   Abdominal:      Palpations: Abdomen is soft.      Tenderness: There is no abdominal tenderness.   Musculoskeletal:      Comments: L foot TMA, wound on left heel with small amount of purulence but no erythema.  Additional scattered wounds to dorsum of left foot.   Skin:     General: Skin is warm and dry.      Findings: No rash.   Neurological:      Mental Status: She is alert.      Cranial Nerves: No facial asymmetry.      Comments: Oriented x0.  Repeatedly asks \"who are you\" and unable to answer questions.  Picking at clothing and restless in bed.  Moving all extremities with grossly normal strength bilaterally.   Psychiatric:         Mood and Affect: Mood normal.         Behavior: Behavior normal.       ???????????????????????????????????????????????????????????????  Assessment and Plan:  83-year-old female presents to the emergency department for altered mental status and hypoxia.  Satting 68% on room air, improved to mid 90s on 5 L nasal cannula, which is an escalation from her apparent baseline of 2 L at the SNF.  Patient is profoundly delirious and encephalopathic on arrival, may be secondary to reported hypercalcemia, possibly due to known malignancy with bony metastases.  Fingerstick glucose is normal.  Broad work-up including VBG, basic and infectious labs, blood cultures, urinalysis, chest x-ray were ordered to evaluate for infectious metabolic causes, as well as a CT head, CT PE, and CT abdomen pelvis to evaluate for acute intracranial abnormalities, PE (given hypoxia and known malignancy) or acute intra-abdominal pathology.  EKG and high-sensitivity troponin ordered as well.    ED Course:  Venous full panel demonstrates an ionized calcium of 2.2, CMP with total calcium of 15.4.  Most likely source of the patient's altered mental status.  Chest x-ray concerning for pulmonary edema and BNP 1200, concerning for concomitant fluid overload.  As " such, will not give a bolus of normal saline, will start with gentle saline infusion at 50 ml an hour.  Patient started on scheduled subcutaneous calcitonin along with Lasix for calcium lowering and diuresis.    Also with hypokalemia to 3.1, expect would lower more with diuresis, was ordered IV potassium for repletion.  Also with hypomagnesemia to 1.3, was ordered repletion of this as well.    Pending imaging at time of sign-out, patient will need to be admitted for altered mental status in the setting of critical hypercalcemia, as well as recurrent hypoxic respiratory failure and fluid overload.    Social Determinants Limiting Care:  None identified    Disposition:  Sign-out to attending physician, see attestation for further details of care    --  Monse De La Cruz MD  Emergency Medicine, PGY-3      Procedures ? Smart[employer] last updated 11/7/2023 9:55 PM        Monse De La Cruz MD  Resident  11/07/23 [id number]

## 2023-11-08 NOTE — H&P
History Of Present Illness   Rene Hogan, A 83 y.o female with past medical history of recently admitted for acute hypoxic respiratory failure 2/2 Covid, Diabetic foot infection 2/2 Pseudomonas and MRSA, Breast cancer with Bone metastasis Left foot osteomyelitis s/p transmetatarsal infection,  Recurrent Fall, generalized weakness brought to the emergency room for hypercalcemia and altered mental status.  History was obtain from patient's nurse as patient was unresponsive on examination. Patient nurse states that patient was hypoxic yesterday requiring 8 liters of oxygen. Baseline oxygen is 3 liters. She states that patient is normally alert and oriented X3 but within 24-48 hours. She has noticed decline in mentation. She denies patient reporting fever, chest pain, nausea, vomiting, abdominal pain, change in urinary habit, leg pain or swelling.     ED course:  CBC: 4.42/9.0/100  CMP: 144/3.1/106/30/11/20/1.92  Calcium: 15.4  AST:43  ALT:49 M.32  Urine Analysis: Unremarkable  Troponin: 130  Ecg: NSR, PHYLICIA, VR 81  Imaging Results:   Chest Xray: Cardiomegaly with increasing vascular congestion centrally and now probable mild interstitial edema or congestive heart failure  Ct of the head: No acute intracranial hemorrhage or mass effect.Nonspecific white matter changes and parenchymal volume loss.Paranasal sinus inflammatory changes.  Ct of the Chest/Abdomen/Pelvis: Small bilateral pleural effusions with bibasilar compressiveatelectasis.  Additional patchy airspace opacities are seen primarilyinvolving the bilateral lower lobes and less significantly the upperlobes raising suspicion for superimposed airspace disease.Vague slightly low-attenuation lesions are seen in the right and lefthepatic lobes raising suspicion for hepatic metastases.Punctate nonobstructive bilateral renal calculi.  No ureteral calculusor evidence for obstructive uropathy.Mixed attenuation left adnexal lesion containing fat and  calcificationmost compatible with dermoid cyst of the left ovary.  Need foradditional evaluation with dedicated pelvic ultrasound should be determinedclinically. Scattered lucent lesions are seen throughout the visualized osseous structures compatible with known osseous metastatic disease.  Xray of the foot: Stump soft tissue swelling without discreteerosion. Consider MR    Intervention: Vancomcyin, Zosyn, Lasix 40, calcitonin, Magnessium 2 gram and Potassium 20      Past Medical History  She has a past medical history of Abnormal findings on diagnostic imaging of heart and coronary circulation (07/30/2019), Abrasion, unspecified foot, initial encounter (06/15/2020), Body mass index (BMI) 31.0-31.9, adult (02/22/2021), Body mass index (BMI) 37.0-37.9, adult, Body mass index (BMI) 38.0-38.9, adult, Body mass index (BMI)30.0-30.9, adult (07/25/2022), Body mass index (BMI)30.0-30.9, adult (06/16/2021), Body mass index (BMI)30.0-30.9, adult (02/28/2022), Body mass index (BMI)30.0-30.9, adult (06/02/2022), Body mass index (BMI)30.0-30.9, adult (04/04/2022), Cellulitis of left lower limb (09/20/2022), Combined forms of age-related cataract, bilateral (07/27/2022), Dyskinesia of esophagus (12/05/2019), Elevated blood-pressure reading, without diagnosis of hypertension (11/03/2015), Epistaxis (11/25/2019), Glaucoma, Hypoxemia (08/17/2020), Idiopathic sleep related nonobstructive alveolar hypoventilation (08/17/2020), Idiopathic sleep related nonobstructive alveolar hypoventilation (08/17/2020), Local infection of the skin and subcutaneous tissue, unspecified (04/13/2020), Nipple discharge (06/02/2017), Non-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 foot with unspecified severity (CMS/HCC) (07/12/2022), Non-pressure chronic ulcer of unspecified part of left lower leg with unspecified severity (CMS/HCC) (06/16/2021), Other conditions influencing health status (05/19/2015), Other disorders of electrolyte and fluid balance,  not elsewhere classified (06/26/2019), Other pulmonary embolism with acute cor pulmonale (CMS/HCC) (06/16/2021), Other specified personal risk factors, not elsewhere classified (07/31/2019), Other specified postprocedural states (06/29/2017), Other symptoms and signs involving the nervous system (08/17/2020), Pain in unspecified foot (04/29/2020), Pain in unspecified hand (07/30/2013), Pain in unspecified hand (06/22/2020), Pain in unspecified hip (06/12/2013), Pain in unspecified hip (06/22/2020), Pain, unspecified (02/28/2022), Personal history of diseases of the skin and subcutaneous tissue (02/12/2020), Personal history of other diseases of the circulatory system (06/16/2021), Personal history of other diseases of the musculoskeletal system and connective tissue (12/17/2020), Personal history of other diseases of the musculoskeletal system and connective tissue (12/04/2020), Personal history of other diseases of the nervous system and sense organs (08/17/2020), Personal history of other diseases of the respiratory system (05/19/2015), Personal history of other diseases of the respiratory system (12/23/2017), Personal history of other specified conditions (06/29/2017), Personal history of other specified conditions (07/05/2018), Personal history of other specified conditions (06/29/2017), Personal history of other specified conditions (07/31/2019), Personal history of other specified conditions (06/22/2020), Personal history of pulmonary embolism (04/06/2022), Preglaucoma, unspecified, unspecified eye, Radiculopathy, lumbar region (02/23/2022), Shortness of breath (08/08/2023), Sleep apnea, unspecified (02/12/2020), Spondylosis without myelopathy or radiculopathy, lumbar region (02/23/2022), Unspecified cataract (06/17/2020), Unspecified cataract, and Unspecified cataract.    Surgical History  She has a past surgical history that includes Cholecystectomy (06/12/2013); Other surgical history (06/17/2020); Foot  surgery (06/29/2017); CT angio aorta and bilateral iliofemoral runoff w and or wo IV contrast (4/25/2020); CT guided percutaneous biopsy bone deep (12/13/2018); CT angio aorta and bilateral iliofemoral runoff w and or wo IV contrast (9/8/2022); and CT angio aorta and bilateral iliofemoral runoff w and or wo IV contrast (5/17/2023).     Social History  She reports that she quit smoking about 23 years ago. Her smoking use included cigarettes. She has a 22.50 pack-year smoking history. She has never used smokeless tobacco. She reports current alcohol use. She reports that she does not currently use drugs.    Family History  Family History   Problem Relation Name Age of Onset    Breast cancer Mother      Coronary artery disease Father          Allergies  Cephalosporins, Ciprofloxacin, Codeine, and Epoetin shankar    Review of Systems   Unable to perform ROS: Patient unresponsive        Physical Exam  Constitutional:       Comments: Unresponsive on examination    Cardiovascular:      Rate and Rhythm: Rhythm irregular.      Heart sounds: Normal heart sounds.   Pulmonary:      Comments: Decrease breath sounds  Abdominal:      General: Abdomen is flat. Bowel sounds are normal.      Palpations: Abdomen is soft.   Musculoskeletal:      Cervical back: Neck supple.      Right lower leg: Edema present.      Comments: Left foot amputation   Skin:     General: Skin is dry.   Neurological:      Comments: Unresponsive on examination          Last Recorded Vitals  BP (!) 202/100   Pulse 84   Temp 36.5 °C (97.7 °F) (Temporal)   Resp 20   Wt 82.4 kg (181 lb 10.5 oz)   SpO2 93%     Relevant Results      Results for orders placed or performed during the hospital encounter of 11/07/23 (from the past 24 hour(s))   POCT GLUCOSE   Result Value Ref Range    POCT Glucose 127 (H) 74 - 99 mg/dL   BLOOD GAS VENOUS FULL PANEL   Result Value Ref Range    POCT pH, Venous 7.50 (H) 7.33 - 7.43 pH    POCT pCO2, Venous 41 41 - 51 mm Hg    POCT pO2,  Venous 47 (H) 35 - 45 mm Hg    POCT SO2, Venous 81 (H) 45 - 75 %    POCT Oxy Hemoglobin, Venous 77.6 (H) 45.0 - 75.0 %    POCT Hematocrit Calculated, Venous 31.0 (L) 36.0 - 46.0 %    POCT Sodium, Venous 142 136 - 145 mmol/L    POCT Potassium, Venous 3.2 (L) 3.5 - 5.3 mmol/L    POCT Chloride, Venous 108 (H) 98 - 107 mmol/L    POCT Ionized Calicum, Venous 2.21 (HH) 1.10 - 1.33 mmol/L    POCT Glucose, Venous 127 (H) 74 - 99 mg/dL    POCT Lactate, Venous 1.5 0.4 - 2.0 mmol/L    POCT Base Excess, Venous 8.1 (H) -2.0 - 3.0 mmol/L    POCT HCO3 Calculated, Venous 32.0 (H) 22.0 - 26.0 mmol/L    POCT Hemoglobin, Venous 10.2 (L) 12.0 - 16.0 g/dL    POCT Anion Gap, Venous 5.0 (L) 10.0 - 25.0 mmol/L    Patient Temperature      FiO2 0 %   CBC and Auto Differential   Result Value Ref Range    WBC 6.7 4.4 - 11.3 x10*3/uL    nRBC 0.0 0.0 - 0.0 /100 WBCs    RBC 3.28 (L) 4.00 - 5.20 x10*6/uL    Hemoglobin 9.8 (L) 12.0 - 16.0 g/dL    Hematocrit 32.0 (L) 36.0 - 46.0 %    MCV 98 80 - 100 fL    MCH 29.9 26.0 - 34.0 pg    MCHC 30.6 (L) 32.0 - 36.0 g/dL    RDW 15.6 (H) 11.5 - 14.5 %    Platelets 120 (L) 150 - 450 x10*3/uL    Neutrophils % 79.8 40.0 - 80.0 %    Immature Granulocytes %, Automated 0.4 0.0 - 0.9 %    Lymphocytes % 11.9 13.0 - 44.0 %    Monocytes % 6.8 2.0 - 10.0 %    Eosinophils % 0.7 0.0 - 6.0 %    Basophils % 0.4 0.0 - 2.0 %    Neutrophils Absolute 5.36 1.60 - 5.50 x10*3/uL    Immature Granulocytes Absolute, Automated 0.03 0.00 - 0.50 x10*3/uL    Lymphocytes Absolute 0.80 0.80 - 3.00 x10*3/uL    Monocytes Absolute 0.46 0.05 - 0.80 x10*3/uL    Eosinophils Absolute 0.05 0.00 - 0.40 x10*3/uL    Basophils Absolute 0.03 0.00 - 0.10 x10*3/uL   Comprehensive metabolic panel   Result Value Ref Range    Glucose 112 (H) 74 - 99 mg/dL    Sodium 144 136 - 145 mmol/L    Potassium 3.1 (L) 3.5 - 5.3 mmol/L    Chloride 106 98 - 107 mmol/L    Bicarbonate 30 21 - 32 mmol/L    Anion Gap 11 10 - 20 mmol/L    Urea Nitrogen 20 6 - 23 mg/dL     Creatinine 1.92 (H) 0.50 - 1.05 mg/dL    eGFR 26 (L) >60 mL/min/1.73m*2    Calcium 15.4 (HH) 8.6 - 10.3 mg/dL    Albumin 3.1 (L) 3.4 - 5.0 g/dL    Alkaline Phosphatase 102 33 - 136 U/L    Total Protein 6.1 (L) 6.4 - 8.2 g/dL    AST 43 (H) 9 - 39 U/L    Bilirubin, Total 0.5 0.0 - 1.2 mg/dL    ALT 49 (H) 7 - 45 U/L   Phosphorus   Result Value Ref Range    Phosphorus 3.1 2.5 - 4.9 mg/dL   Magnesium   Result Value Ref Range    Magnesium 1.32 (L) 1.60 - 2.40 mg/dL   Troponin I, High Sensitivity   Result Value Ref Range    Troponin I, High Sensitivity 147 (HH) 0 - 13 ng/L   B-Type Natriuretic Peptide   Result Value Ref Range    BNP 1,248 (H) 0 - 99 pg/mL   Morphology   Result Value Ref Range    RBC Morphology No significant RBC morphology present    Urinalysis with Reflex Microscopic and Culture   Result Value Ref Range    Color, Urine Yellow Straw, Yellow    Appearance, Urine Clear Clear    Specific Gravity, Urine 1.008 1.005 - 1.035    pH, Urine 6.0 5.0, 5.5, 6.0, 6.5, 7.0, 7.5, 8.0    Protein, Urine NEGATIVE NEGATIVE mg/dL    Glucose, Urine NEGATIVE NEGATIVE mg/dL    Blood, Urine NEGATIVE NEGATIVE    Ketones, Urine NEGATIVE NEGATIVE mg/dL    Bilirubin, Urine NEGATIVE NEGATIVE    Urobilinogen, Urine <2.0 <2.0 mg/dL    Nitrite, Urine NEGATIVE NEGATIVE    Leukocyte Esterase, Urine NEGATIVE NEGATIVE   Extra Urine Gray Tube   Result Value Ref Range    Extra Tube Hold for add-ons.    Troponin I, High Sensitivity   Result Value Ref Range    Troponin I, High Sensitivity 130 (HH) 0 - 13 ng/L   Sars-CoV-2 PCR, Symptomatic   Result Value Ref Range    Coronavirus 2019, PCR Not Detected Not Detected   Influenza A, and B PCR   Result Value Ref Range    Flu A Result Not Detected Not Detected    Flu B Result Not Detected Not Detected   Lactate   Result Value Ref Range    Lactate 1.6 0.4 - 2.0 mmol/L     XR foot left 3+ views    Result Date: 11/8/2023  STUDY: Foot Radiographs; 11/07/2023 11:31 PM INDICATION: Concern for left foot  osteomyelitis. COMPARISON: XR left foot 10/15/2023, 05/16/2023.  ACCESSION NUMBER(S): [id number] ORDERING CLINICIAN: VALENTINO SOTELO TECHNIQUE:  Three view(s) of the left foot. FINDINGS:  There is no displaced fracture.  The alignment is anatomic. Postsurgical change change transmetatarsal resection present.  Soft tissue swelling along the stump seen.  No discrete osseous erosions identified.      Stump soft tissue swelling without discrete erosion.  Consider MR correlation. Signed by Irvin Maciel MD    CT head wo IV contrast    Result Date: 11/8/2023  Interpreted By:  Madalyn Arias, STUDY: CT HEAD WO IV CONTRAST;  11/7/2023 11:22 pm   INDICATION: AMS, oriented x0.   COMPARISON: 05/13/2023   ACCESSION NUMBER(S): [id number]   ORDERING CLINICIAN: CHUCK AVILA   TECHNIQUE: Axial noncontrast CT images of the head.   FINDINGS: BRAIN PARENCHYMA: Generalized parenchymal volume loss noted with concordant ventricular enlargement. Non-specific white matter changes noted, which may be related to small vessel disease.  No mass effect or midline shift. Calcifications of the carotid arteries.   HEMORRHAGE: No acute intracranial hemorrhage. VENTRICLES and EXTRA-AXIAL SPACES: The ventricles, sulci and basal cisterns enlarged, concordant with parenchymal volume loss. EXTRACRANIAL SOFT TISSUES: Within normal limits. PARANASAL SINUSES/MASTOIDS: Mucosal thickening and fluid within the paranasal sinuses predominately the maxillary and ethmoid sinuses. Mastoid air cells are patent. CALVARIUM: No depressed skull fracture. No destructive osseous lesion.   OTHER FINDINGS: Postsurgical changes of the globes.       No acute intracranial hemorrhage or mass effect.   Nonspecific white matter changes and parenchymal volume loss.   Paranasal sinus inflammatory changes.   MACRO: None.   Signed by: Madalyn Arias 11/8/2023 12:30 AM Dictation workstation:   [id number]    CT chest abdomen pelvis wo IV contrast    Result Date:  11/8/2023  STUDY: CT Chest, Abdomen, and Pelvis without IV Contrast; 11/07/2023 11:25 PM INDICATION: Altered mental status, hypoxia. COMPARISON: XR chest 11/07/2023.  CT chest/abdomen/pelvis 05/13/2023, ACCESSION NUMBER(S): [id number] ORDERING CLINICIAN: VALENTINO SOTELO TECHNIQUE: CT of the chest, abdomen, and pelvis was performed.  Contiguous axial images were obtained at 3 mm slice thickness through the chest, abdomen, and pelvis.  Coronal and sagittal reconstructions at 3 mm slice thickness were performed.  No intravenous contrast was administered.  FINDINGS: Please note that the evaluation of vessels, lymph nodes and organs is limited without intravenous contrast. CHEST: MEDIASTINUM: The heart is normal in size without pericardial effusion.  Right-sided PICC line is in place. LUNGS/PLEURA: Small bilateral pleural effusions are demonstrated with bibasilar areas of compressive atelectasis.  There is no pleural thickening, or pneumothorax.  The airways are patent. In addition there are patchy airspace opacities primarily involving bilateral lower lobes suspicious for superimposed airspace disease. Minimal patchy opacity also seen in the upper lobes bilaterally. Calcified granuloma measuring 1.4 cm in the right upper lobe. LYMPH NODES: Thoracic lymph nodes are not enlarged. ABDOMEN:  LIVER: Several vague slightly low-attenuation lesions are suggested within the liver, raising suspicion for metastatic disease, measuring up to 2.8 cm in the right hepatic lobe.  BILE DUCTS: No intrahepatic or extrahepatic biliary ductal dilatation.  GALLBLADDER: Gallbladder is absent. STOMACH: No abnormalities identified.  PANCREAS: No masses or ductal dilatation.  SPLEEN: No splenomegaly or focal splenic lesion.  ADRENAL GLANDS: No thickening or nodules.  KIDNEYS AND URETERS: Kidneys are normal in size and location.  Punctate nonobstructive bilateral renal calculi.  No ureteral calculus or hydronephrosis.  PELVIS:  BLADDER: No  abnormalities identified.  REPRODUCTIVE ORGANS: Uterus is present.  Left adnexal mixed attenuation lesion measuring 7.2 x 4.9 cm containing fat and calcification.  BOWEL: Moderate fecal material seen throughout the colon.  Small and large bowel loops are otherwise grossly unremarkable in appearance and without evidence for bowel obstruction.  Appendix is within normal limits.  VESSELS: Coronary arterial calcification are noted.  Mild to moderate atherosclerotic calcification of the thoracoabdominal aorta and branch vessels.  Abdominal aorta is normal in caliber.  Extensive atherosclerotic calcification of the abdominal aorta and branch vessels.  PERITONEUM/RETROPERITONEUM/LYMPH NODES: No free fluid.  No pneumoperitoneum. No lymphadenopathy.  ABDOMINAL WALL: No abnormalities identified. SOFT TISSUES: No abnormalities identified.  BONES: Lucent lesions are again demonstrated throughout the visualized osseous structures compatible with known osseous metastatic disease.    Small bilateral pleural effusions with bibasilar compressive atelectasis.  Additional patchy airspace opacities are seen primarily involving the bilateral lower lobes and less significantly the upper lobes raising suspicion for superimposed airspace disease. Vague slightly low-attenuation lesions are seen in the right and left hepatic lobes raising suspicion for hepatic metastases. Punctate nonobstructive bilateral renal calculi.  No ureteral calculus or evidence for obstructive uropathy. Mixed attenuation left adnexal lesion containing fat and calcification most compatible with dermoid cyst of the left ovary.  Need for additional evaluation with dedicated pelvic ultrasound should be determined clinically. Scattered lucent lesions are seen throughout the visualized osseous structures compatible with known osseous metastatic disease. Signed by Massimo Welsh MD    XR chest 1 view    Result Date: 11/7/2023  STUDY: Chest Radiograph;  11/07/2023 9:36 PM  INDICATION: Acute hypoxia, altered mental status.  COMPARISON: XR chest 10/15/2023, 05/12/2023.  ACCESSION NUMBER(S): [id number] ORDERING CLINICIAN: Monse De La Cruz TECHNIQUE:  Frontal chest was obtained at 2128 hours. FINDINGS: CARDIOMEDIASTINAL SILHOUETTE: The heart remains enlarged and there is increasing central pulmonary vascularity..  LUNGS: Interstitial infiltrates are seen throughout the lungs increased from the prior study probably representing pulmonary edema.  A no lobar consolidation is seen and there are no effusions or pneumothorax. There is a right-sided central line extending to the lower superior vena cava..  ABDOMEN: No remarkable upper abdominal findings.  BONES: No acute osseous changes.    Cardiomegaly with increasing vascular congestion centrally and now probable mild interstitial edema or congestive failure.. Signed by Anders Millan MD            Assessment/Plan    Rene Hogan, A 83 y.o female with past medical history of recently admitted for acute hypoxic respiratory failure 2/2 Covid, Diabetic foot infection 2/2 Pseudomonas and MRSA, Breast cancer with Bone metastasis Left foot osteomyelitis s/p transmetatarsal infection,  Recurrent Fall, generalized weakness brought to the emergency room for hypercalcemia and altered mental status.      Neurology  #Acute Encephalopathy  -Likely metabolic in nature  -Treat Underlying cause  - Hold tizanidine, Diphenhydramine and oxybutynin for now in the setting of altered mental status    Pulmonology  #Acute Hypoxic Respiratory Failure   # Acute Decompensated Heart Failure  #History of COPD( not in exacerbation)  -On 5 liters, wean as tolerated. Baseline 2 liters    Cardiology  #Acute Decompensated Heart Failure  - Echocardiogram in 05/22 showed EF 60-65 with impaired diastolic filling. Moderate- Severe tricuspid regurgitation, Moderate-severe right ventricular systolic pressure. Moderate - severe pulmonary artery pressure  Plan  -Diurese as tolerated      #Paroxysmal Atrial Fibrillation  - Hold eliquis and metoprolol tartrate as patient is unresponsive    #Hypertension Urgency  -Prn Hydralazine in place for Sbp>180    Gastroenterology   #Elevated LFT  -Likely due to metastasis    Nephrology  #Hypercalcemia  -Likely 2/2 to Breast cancer to mets   -Continue calcitonin twice daily  - Hold vitamin D for now    #Acute Kidney Injury   -Pre renal in nature in the setting on decompensated Heart failure    Hematology   #Breast cancer with Metastasis   - hold exemestane as patient is unresponsive     Musculoskeletal   #Left foot osteomyelitis s/p transmetatarsal amputation     Endocrinology   #Type 2 DM  -on ISS    Access: piv  Diet: NPO  GI Prophylaxis: none  DVT: SCD  Antibiotics: None  Code: DNAR   Disposition: Patient admitted for altered mental status and Hypercalcemia. Plan is to transfer patient to the floor for further management due to code status.  Palliative Care consulted.           Anders Hernandez MD

## 2023-11-08 NOTE — CARE PLAN
The patient's goals for the shift include      The clinical goals for the shift include Patient will remain comfortable.    Over the shift, the patient did not make progress toward the following goals. Barriers to progression include . Recommendations to address these barriers include .

## 2023-11-08 NOTE — CONSULTS
Wound Care Consult     Visit Date: 11/8/2023      Patient Name: Samira López         MRN: [id number]           YOB: 1940     Reason for Consult: Wounds POA        Wound History: Multiple wounds, history of Ischemic leg, SCC, Dmll, PVD in part.     Pertinent Labs:   Albumin   Date Value Ref Range Status   11/08/2023 3.2 (L) 3.4 - 5.0 g/dL Final   10/26/2022 4.1 3.4 - 5.0 g/dL Final       Wound Team Summary Assessment: Assessment refused a this time bu Deana BARTHOLOMEW and family, patient seen to have Cheyne-Lyon breathing.  Images in medical record.     Wound Team Plan: Discussed with Deana BARTHOLOMEW, supportive care at this time.     Ruby Kirkland RN  11/8/2023  3:36 PM

## 2023-11-08 NOTE — PROGRESS NOTES
11/08/23 145   Discharge Planning   Living Arrangements Alone  ([place])   Support Systems Spouse/significant other;Children   Type of Residence Skilled nursing facility   Home or Post Acute Services In home services;Post acute facilities (Rehab/SNF/etc)   Patient expects to be discharged to: discharge plan pending palliative care consult

## 2023-11-08 NOTE — CONSULTS
Subjective    Samira López  is a 83 y.o. female who has past medical history of recent admission due to acute hypoxic respiratory failure 2/2 Covid, diabetic foot infection 2/2 Pseudomonas and MRSA, breast cancer with bone metastasis, L foot osteomyelitis s/p transmetatarsal infection, recurrent fall, generalized weakness brought to the emergency room for hypercalcemia and altered mental status. Nephrology consulted for management of symptomatic hypercalcemia.    Patient examined in her room while lying in bed. Son and daughter in law bedside. Pt is encephalopathic and unresponsive, baseline is A&Ox3. Her encephalopathy came on suddenly and without prodrome.    Objective   /70  Pulse 68   Temp 36.4 °C  Resp 16   Ht 1.702 m  Wt 73.1 kg  SpO2 93%   BMI 25.23 kg/m²   Wt Readings from Last 3 Encounters:   11/08/23 73.1 kg (161 lb 2.5 oz)   11/01/23 79.4 kg (175 lb 0.7 oz)   10/24/23 85.9 kg (189 lb 6 oz)     Physical Exam    General appearance: encephalopathic, unresponsive, appeared to have clinically significant fluid overload  HEENT: atrumatic head, moist mucosa  Skin: erythema on RLE ascending from foot  Heart: NSR, S1, S2 normal, no murmur or gallop  Lungs: Symmetrical expansion, decreased air movement with rales  Abdomen: soft, nt/nd, obese  Extremities: rash RLE, s/p R metatarsal amputation, no edema  Neuro: Encephalopathic, involuntary muscular contractions on face and R shoulder     Review of Systems  Conducted with family at bedside  Constitutional: no fever, no chills, no recent weight gain and no recent weight loss.   Eyes: no blurred vision and no diplopia.   ENT: no hearing loss, no earache, no sore throat, no swollen glands in the neck and no nasal discharge.   Cardiovascular: no chest pain, no palpitations, and no lower extremity edema.   Respiratory: no shortness of breath, no chronic cough, and mild shortness of breath during exertion.   Gastrointestinal: mild abdominal pain,  constipation, no heartburn, no vomiting, no bloody stools and no diarrhea.   Genitourinary: Unknown.   Musculoskeletal: no arthralgias and no myalgias.   Skin: LLE erythema   Neurological: no headaches and no dizziness.   Psychiatric: Apart from current encephalopathy, no confusion, no depression and no anxiety.   Endocrine: no heat intolerance, no cold intolerance, appetite not increased, no thyroid disorder, no increased urinary frequency and no dry skin.   Hematologic/Lymphatic: does not bleed easily and does not bruise easily.   All other systems have been reviewed and are negative for complaint.     Data Review    Results from last 7 days   Lab Units 11/08/23  0501 11/07/23  2115   WBC AUTO x10*3/uL 9.0 6.7   HEMOGLOBIN g/dL 9.6* 9.8*   HEMATOCRIT % 32.1* 32.0*   PLATELETS AUTO x10*3/uL 126* 120*      Results from last 7 days   Lab Units 11/08/23  0644 11/08/23  0336 11/07/23  2115   SODIUM mmol/L 142 141 144   POTASSIUM mmol/L 3.7 3.6 3.1*   CHLORIDE mmol/L 104 104 106   CO2 mmol/L 31 32 30   BUN mg/dL 21 21 20   CREATININE mg/dL 1.89* 1.91* 1.92*   EGFR mL/min/1.73m*2 26* 26* 26*   GLUCOSE mg/dL 190* 213* 112*   CALCIUM mg/dL 14.4* 14.7* 15.4*   PHOSPHORUS mg/dL 4.6 4.8 3.1     Lab Results   Component Value Date    HGBA1C 5.9 01/06/2023     Assessment and Plan      Samira López  is a 83 y.o. female who has past medical history of recent admission due to acute hypoxic respiratory failure 2/2 Covid, diabetic foot infection 2/2 Pseudomonas and MRSA, breast cancer with bone metastasis, L foot osteomyelitis s/p transmetatarsal infection, recurrent fall, generalized weakness brought to the emergency room for hypercalcemia and altered mental status. Nephrology consulted for management of symptomatic hypercalcemia.    Evaluation of subjective and objective data are most consistent with hypercalcemia 2/2 metastatic bone lesions causing bone resorption. Hypercalcemia likely reversible with medical or renal  replacement therapy. Patient is DNR-comfort care, and palliative care is on board. Recommendations are based on family wishes and patient's documentation.    After recommendations made, palliative care informed nephrology team that the patient is actively dying and any measures should be comfort care. Defer to pal care and primary for assessment of which interventions are most consistent with patient's wishes. Conservative management is presented in plan.    Impression    # Encephalopathy  # Hypercalcemia  :: Suspect encephalopathy is 2/2 hypercalcemia  :: Hypercalcemia most likely caused by malignancy and bone resorption from metastatic bone lesions  - Continue calcitonin BID  - Denosumab 60mg daily  - PTH, PTH related protein, vitamin D, urine electrolyte panel  - Do not give IV bisphosphonate at this time given LYDIA  - Continue IV fluids as tolerated given HFpEF  -- Currently 50mL/hr, usually recommend 125-200mL/hr, but not indicated in this case given CHF  - Patient wishes preclude renal replacement therapy, discussed with family and they believe that any RRT, short or long term, is contrary to patient wishes    # Non-oliguric LYDIA  :: Etiology unclear, possible pseudoazotemia 2/2 palbociclib use vs dehydration vs cardiorenal  - Urine elecrolytes  - Diurese per cardiology    Recommendations  :: Should the family, patient, and primary team wish to pursue hypercalcemia workup, we suggest the following  - Defer aggressiveness of treatment to palliative care and primary team  - Do not pursue renal replacement therapy  - PTH, PTH related protein, vitamin D, urine electrolyte panel  - Denosumab 60mg daily  - Continue calcitonin  - Diurese per cardiology  - Consider increasing IV fluids depending on volume status, at this time we do not recommend increasing current rate of 50mL/hr given CHF

## 2023-11-08 NOTE — CONSULTS
Consults    Reason For Consult: [facility]      History Of Present Illness  Samira López  is a 83 y.o. female who has past medical history of recent admission due to acute hypoxic respiratory failure 2/2 Covid, diabetic foot infection 2/2 Pseudomonas and MRSA, breast cancer with bone metastasis, L foot osteomyelitis s/p transmetatarsal infection, recurrent fall, generalized weakness brought to the emergency room for hypercalcemia and altered mental status.        Past Medical History  She has a past medical history of Abnormal findings on diagnostic imaging of heart and coronary circulation (07/30/2019), Abrasion, unspecified foot, initial encounter (06/15/2020), Body mass index (BMI) 31.0-31.9, adult (02/22/2021), Body mass index (BMI) 37.0-37.9, adult, Body mass index (BMI) 38.0-38.9, adult, Body mass index (BMI)30.0-30.9, adult (07/25/2022), Body mass index (BMI)30.0-30.9, adult (06/16/2021), Body mass index (BMI)30.0-30.9, adult (02/28/2022), Body mass index (BMI)30.0-30.9, adult (06/02/2022), Body mass index (BMI)30.0-30.9, adult (04/04/2022), Cellulitis of left lower limb (09/20/2022), Combined forms of age-related cataract, bilateral (07/27/2022), Dyskinesia of esophagus (12/05/2019), Elevated blood-pressure reading, without diagnosis of hypertension (11/03/2015), Epistaxis (11/25/2019), Glaucoma, Hypoxemia (08/17/2020), Idiopathic sleep related nonobstructive alveolar hypoventilation (08/17/2020), Idiopathic sleep related nonobstructive alveolar hypoventilation (08/17/2020), Local infection of the skin and subcutaneous tissue, unspecified (04/13/2020), Nipple discharge (06/02/2017), Non-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 foot with unspecified severity (CMS/HCC) (07/12/2022), Non-pressure chronic ulcer of unspecified part of left lower leg with unspecified severity (CMS/HCC) (06/16/2021), Other conditions influencing health status (05/19/2015), Other disorders of electrolyte and fluid balance, not  elsewhere classified (06/26/2019), Other pulmonary embolism with acute cor pulmonale (CMS/HCC) (06/16/2021), Other specified personal risk factors, not elsewhere classified (07/31/2019), Other specified postprocedural states (06/29/2017), Other symptoms and signs involving the nervous system (08/17/2020), Pain in unspecified foot (04/29/2020), Pain in unspecified hand (07/30/2013), Pain in unspecified hand (06/22/2020), Pain in unspecified hip (06/12/2013), Pain in unspecified hip (06/22/2020), Pain, unspecified (02/28/2022), Personal history of diseases of the skin and subcutaneous tissue (02/12/2020), Personal history of other diseases of the circulatory system (06/16/2021), Personal history of other diseases of the musculoskeletal system and connective tissue (12/17/2020), Personal history of other diseases of the musculoskeletal system and connective tissue (12/04/2020), Personal history of other diseases of the nervous system and sense organs (08/17/2020), Personal history of other diseases of the respiratory system (05/19/2015), Personal history of other diseases of the respiratory system (12/23/2017), Personal history of other specified conditions (06/29/2017), Personal history of other specified conditions (07/05/2018), Personal history of other specified conditions (06/29/2017), Personal history of other specified conditions (07/31/2019), Personal history of other specified conditions (06/22/2020), Personal history of pulmonary embolism (04/06/2022), Preglaucoma, unspecified, unspecified eye, Radiculopathy, lumbar region (02/23/2022), Shortness of breath (08/08/2023), Sleep apnea, unspecified (02/12/2020), Spondylosis without myelopathy or radiculopathy, lumbar region (02/23/2022), Unspecified cataract (06/17/2020), Unspecified cataract, and Unspecified cataract.    Surgical History  She has a past surgical history that includes Cholecystectomy (06/12/2013); Other surgical history (06/17/2020); Foot surgery  (06/29/2017); CT angio aorta and bilateral iliofemoral runoff w and or wo IV contrast (4/25/2020); CT guided percutaneous biopsy bone deep (12/13/2018); CT angio aorta and bilateral iliofemoral runoff w and or wo IV contrast (9/8/2022); and CT angio aorta and bilateral iliofemoral runoff w and or wo IV contrast (5/17/2023).     Social History  She reports that she quit smoking about 23 years ago. Her smoking use included cigarettes. She has a 22.50 pack-year smoking history. She has never used smokeless tobacco. She reports current alcohol use. She reports that she does not currently use drugs.    Family History  Family History   Problem Relation Name Age of Onset    Breast cancer Mother      Coronary artery disease Father          Allergies  Cephalosporins, Ciprofloxacin, Codeine, and Epoetin shankar    Physical Exam  Ill appearing and Uncomfortable     Last Recorded Vitals  /70 (BP Location: Left arm, Patient Position: Lying)   Pulse 68   Temp 36.4 °C (97.5 °F) (Temporal)   Resp 16   Wt 73.1 kg (161 lb 2.5 oz)   SpO2 93%          Assessment/Plan   This is a terminally ill actively dying pt 2/2 to acute hypoxic respiratory failure that is complicated by hypercalcemia 2/2 breast cancer w mets. The family has elected comfort care only for this patient. She is extremely symptomatic with yousif EOL dyspnea, restlessness, and worsened chronic tremor. Life expectancy is minutes to hours.     Immediately ordered diluadid 0.5mg iv and lorazepam  0,5mg IV x1.   Pt responded well and is is now comfortable and placed on NC with a few liters flow   General comfort care orders placed and can be admistered frequently  Nursing willl continue to assess pt for symptom needs   Family at bedside    ARIAS Chong, Dr. Avilez and Dr. Tomas updated    Sheila Camarillo, APRN-CNP

## 2023-11-09 ENCOUNTER — APPOINTMENT (OUTPATIENT)
Dept: PRIMARY CARE | Facility: CLINIC | Age: 83
End: 2023-11-09
Payer: MEDICARE

## 2023-11-09 VITALS
BODY MASS INDEX: 25.29 KG/M2 | OXYGEN SATURATION: 33 % | RESPIRATION RATE: 11 BRPM | DIASTOLIC BLOOD PRESSURE: 56 MMHG | SYSTOLIC BLOOD PRESSURE: 147 MMHG | WEIGHT: 161.16 LBS | HEIGHT: 67 IN | HEART RATE: 101 BPM | TEMPERATURE: 97.7 F

## 2023-11-09 PROCEDURE — 2500000004 HC RX 250 GENERAL PHARMACY W/ HCPCS (ALT 636 FOR OP/ED)

## 2023-11-09 PROCEDURE — 99239 HOSP IP/OBS DSCHRG MGMT >30: CPT | Performed by: NURSE PRACTITIONER

## 2023-11-09 PROCEDURE — 99233 SBSQ HOSP IP/OBS HIGH 50: CPT | Performed by: NURSE PRACTITIONER

## 2023-11-09 PROCEDURE — 2500000004 HC RX 250 GENERAL PHARMACY W/ HCPCS (ALT 636 FOR OP/ED): Performed by: NURSE PRACTITIONER

## 2023-11-09 PROCEDURE — 2500000001 HC RX 250 WO HCPCS SELF ADMINISTERED DRUGS (ALT 637 FOR MEDICARE OP): Performed by: NURSE PRACTITIONER

## 2023-11-09 PROCEDURE — 2500000005 HC RX 250 GENERAL PHARMACY W/O HCPCS: Performed by: FAMILY MEDICINE

## 2023-11-09 RX ORDER — GLYCOPYRROLATE 0.2 MG/ML
INJECTION INTRAMUSCULAR; INTRAVENOUS
Status: COMPLETED
Start: 2023-11-09 | End: 2023-11-09

## 2023-11-09 RX ORDER — SCOLOPAMINE TRANSDERMAL SYSTEM 1 MG/1
1 PATCH, EXTENDED RELEASE TRANSDERMAL
Status: DISCONTINUED | OUTPATIENT
Start: 2023-11-09 | End: 2023-11-09 | Stop reason: HOSPADM

## 2023-11-09 RX ADMIN — LORAZEPAM 2 MG: 2 INJECTION INTRAMUSCULAR; INTRAVENOUS at 12:09

## 2023-11-09 RX ADMIN — Medication 3 L/MIN: at 08:35

## 2023-11-09 RX ADMIN — HYDROMORPHONE HYDROCHLORIDE 0.5 MG: 1 INJECTION, SOLUTION INTRAMUSCULAR; INTRAVENOUS; SUBCUTANEOUS at 14:09

## 2023-11-09 RX ADMIN — GLYCOPYRROLATE 0.4 MG: 0.2 INJECTION, SOLUTION INTRAMUSCULAR; INTRAVENOUS at 12:19

## 2023-11-09 RX ADMIN — HYDROMORPHONE HYDROCHLORIDE 0.5 MG: 1 INJECTION, SOLUTION INTRAMUSCULAR; INTRAVENOUS; SUBCUTANEOUS at 06:18

## 2023-11-09 RX ADMIN — HYDROMORPHONE HYDROCHLORIDE 0.5 MG: 1 INJECTION, SOLUTION INTRAMUSCULAR; INTRAVENOUS; SUBCUTANEOUS at 00:00

## 2023-11-09 RX ADMIN — CARBOXYMETHYLCELLULOSE SODIUM 1 DROP: 5 SOLUTION/ DROPS OPHTHALMIC at 14:28

## 2023-11-09 RX ADMIN — HYDROMORPHONE HYDROCHLORIDE 0.5 MG: 1 INJECTION, SOLUTION INTRAMUSCULAR; INTRAVENOUS; SUBCUTANEOUS at 11:21

## 2023-11-09 RX ADMIN — CARBOXYMETHYLCELLULOSE SODIUM 1 DROP: 5 SOLUTION/ DROPS OPHTHALMIC at 06:18

## 2023-11-09 RX ADMIN — SCOPOLAMINE 1 PATCH: 1.5 PATCH, EXTENDED RELEASE TRANSDERMAL at 15:17

## 2023-11-09 RX ADMIN — HYDROMORPHONE HYDROCHLORIDE 0.5 MG: 1 INJECTION, SOLUTION INTRAMUSCULAR; INTRAVENOUS; SUBCUTANEOUS at 12:39

## 2023-11-09 RX ADMIN — HYDROMORPHONE HYDROCHLORIDE 0.5 MG: 1 INJECTION, SOLUTION INTRAMUSCULAR; INTRAVENOUS; SUBCUTANEOUS at 09:18

## 2023-11-09 ASSESSMENT — PAIN SCALES - PAIN ASSESSMENT IN ADVANCED DEMENTIA (PAINAD)
NEGVOCALIZATION: OCCASIONAL MOAN/GROAN, LOW SPEECH, NEGATIVE/DISAPPROVING QUALITY
BODYLANGUAGE: RELAXED
NEGVOCALIZATION: OCCASIONAL MOAN/GROAN, LOW SPEECH, NEGATIVE/DISAPPROVING QUALITY
FACIALEXPRESSION: SMILING OR INEXPRESSIVE
BREATHING: OCCASIONAL LABORED BREATHING, SHORT PERIOD OF HYPERVENTILATION
BODYLANGUAGE: RIGID, FISTS CLENCHED, KNEES UP, PUSHING/PULLING AWAY, STRIKES OUT
TOTALSCORE: 7
TOTALSCORE: 2
TOTALSCORE: REST
BREATHING: OCCASIONAL LABORED BREATHING, SHORT PERIOD OF HYPERVENTILATION
CONSOLABILITY: NO NEED TO CONSOLE
FACIALEXPRESSION: SMILING OR INEXPRESSIVE
BREATHING: OCCASIONAL LABORED BREATHING, SHORT PERIOD OF HYPERVENTILATION
NEGVOCALIZATION: OCCASIONAL MOAN/GROAN, LOW SPEECH, NEGATIVE/DISAPPROVING QUALITY
FACIALEXPRESSION: FACIAL GRIMACING
CONSOLABILITY: NO NEED TO CONSOLE
BODYLANGUAGE: TENSE, DISTRESSED PACING, FIDGETING
TOTALSCORE: 3
TOTALSCORE: REST
TOTALSCORE: 2
TOTALSCORE: REST
BODYLANGUAGE: RELAXED
NEGVOCALIZATION: OCCASIONAL MOAN/GROAN, LOW SPEECH, NEGATIVE/DISAPPROVING QUALITY
FACIALEXPRESSION: SMILING OR INEXPRESSIVE
BODYLANGUAGE: RELAXED
CONSOLABILITY: NO NEED TO CONSOLE
TOTALSCORE: MEDICATION (SEE MAR)
FACIALEXPRESSION: SMILING OR INEXPRESSIVE
TOTALSCORE: MEDICATION (SEE MAR)
TOTALSCORE: 2
CONSOLABILITY: NO NEED TO CONSOLE
BREATHING: OCCASIONAL LABORED BREATHING, SHORT PERIOD OF HYPERVENTILATION
BREATHING: NOISY LABORED BREATHING, LONG PERIODS OF HYPERVENTILATION, CHEYNE-STOKES RESPIRATIONS
CONSOLABILITY: NO NEED TO CONSOLE
NEGVOCALIZATION: OCCASIONAL MOAN/GROAN, LOW SPEECH, NEGATIVE/DISAPPROVING QUALITY

## 2023-11-09 ASSESSMENT — PAIN - FUNCTIONAL ASSESSMENT
PAIN_FUNCTIONAL_ASSESSMENT: PAINAD (PAIN ASSESSMENT IN ADVANCED DEMENTIA SCALE)

## 2023-11-09 NOTE — PROGRESS NOTES
Samira López is a 83 y.o. female on day 1 of admission presenting with Altered mental status, unspecified altered mental status type.      Subjective   Patient is unresponsive with long periods of apnea. Family at the bedside. Considering GIP if she stabilizes.        Objective     Last Recorded Vitals  /56   Pulse 101   Temp 36.5 °C (97.7 °F) (Temporal)   Resp 11   Wt 73.1 kg (161 lb 2.5 oz)   SpO2 (!) 33%   Intake/Output last 3 Shifts:    Intake/Output Summary (Last 24 hours) at 11/9/2023 1432  Last data filed at 11/9/2023 0300  Gross per 24 hour   Intake --   Output 400 ml   Net -400 ml       Admission Weight  Weight: 82.4 kg (181 lb 10.5 oz) (11/07/23 2012)    Daily Weight  11/08/23 : 73.1 kg (161 lb 2.5 oz)    Image Results  XR foot left 3+ views  Narrative: STUDY:  Foot Radiographs; 11/07/2023 11:31 PM   INDICATION:  Concern for left foot osteomyelitis.  COMPARISON:  XR left foot 10/15/2023, 05/16/2023.    ACCESSION NUMBER(S):  [id number]  ORDERING CLINICIAN:  VALENTINO SOTELO  TECHNIQUE:  Three view(s) of the left foot.  FINDINGS:    There is no displaced fracture.  The alignment is anatomic.   Postsurgical change change transmetatarsal resection present.  Soft  tissue swelling along the stump seen.  No discrete osseous erosions  identified.    Impression: Stump soft tissue swelling without discrete erosion.  Consider MR  correlation.  Signed by Irvin Maciel MD  CT head wo IV contrast  Narrative: Interpreted By:  Madalyn Arias,   STUDY:  CT HEAD WO IV CONTRAST;  11/7/2023 11:22 pm      INDICATION:  AMS, oriented x0.      COMPARISON:  05/13/2023      ACCESSION NUMBER(S):  [id number]      ORDERING CLINICIAN:  CHUCK AVILA      TECHNIQUE:  Axial noncontrast CT images of the head.      FINDINGS:  BRAIN PARENCHYMA: Generalized parenchymal volume loss noted with  concordant ventricular enlargement. Non-specific white matter changes  noted, which may be related to small vessel disease.  No mass  effect  or midline shift. Calcifications of the carotid arteries.      HEMORRHAGE: No acute intracranial hemorrhage.  VENTRICLES and EXTRA-AXIAL SPACES: The ventricles, sulci and basal  cisterns enlarged, concordant with parenchymal volume loss.  EXTRACRANIAL SOFT TISSUES: Within normal limits. PARANASAL  SINUSES/MASTOIDS: Mucosal thickening and fluid within the paranasal  sinuses predominately the maxillary and ethmoid sinuses. Mastoid air  cells are patent. CALVARIUM: No depressed skull fracture. No  destructive osseous lesion.      OTHER FINDINGS: Postsurgical changes of the globes.      Impression: No acute intracranial hemorrhage or mass effect.      Nonspecific white matter changes and parenchymal volume loss.      Paranasal sinus inflammatory changes.      MACRO:  None.      Signed by: Madalyn Arias 11/8/2023 12:30 AM  Dictation workstation:   [id number]  CT chest abdomen pelvis wo IV contrast  Narrative: STUDY:  CT Chest, Abdomen, and Pelvis without IV Contrast; 11/07/2023 11:25 PM  INDICATION:  Altered mental status, hypoxia.  COMPARISON:  XR chest 11/07/2023.  CT chest/abdomen/pelvis 05/13/2023,   ACCESSION NUMBER(S):  [id number]  ORDERING CLINICIAN:  VALENTINO SOTELO  TECHNIQUE:  CT of the chest, abdomen, and pelvis was performed.  Contiguous axial  images were obtained at 3 mm slice thickness through the chest,  abdomen, and pelvis.  Coronal and sagittal reconstructions at 3 mm  slice thickness were performed.  No intravenous contrast was  administered.    FINDINGS:  Please note that the evaluation of vessels, lymph nodes and organs is  limited without intravenous contrast.   CHEST:  MEDIASTINUM:  The heart is normal in size without pericardial effusion.  Right-sided  PICC line is in place.  LUNGS/PLEURA:  Small bilateral pleural effusions are demonstrated with bibasilar  areas of compressive atelectasis.  There is no pleural thickening, or  pneumothorax.  The airways are patent.  In addition there  are patchy airspace opacities primarily involving  bilateral lower lobes suspicious for superimposed airspace disease.   Minimal patchy opacity also seen in the upper lobes bilaterally.   Calcified granuloma measuring 1.4 cm in the right upper lobe.  LYMPH NODES:  Thoracic lymph nodes are not enlarged.  ABDOMEN:     LIVER:  Several vague slightly low-attenuation lesions are suggested within  the liver, raising suspicion for metastatic disease, measuring up to  2.8 cm in the right hepatic lobe.     BILE DUCTS:  No intrahepatic or extrahepatic biliary ductal dilatation.     GALLBLADDER:  Gallbladder is absent.  STOMACH:  No abnormalities identified.     PANCREAS:  No masses or ductal dilatation.     SPLEEN:  No splenomegaly or focal splenic lesion.     ADRENAL GLANDS:  No thickening or nodules.     KIDNEYS AND URETERS:  Kidneys are normal in size and location.  Punctate nonobstructive  bilateral renal calculi.  No ureteral calculus or hydronephrosis.     PELVIS:     BLADDER:  No abnormalities identified.     REPRODUCTIVE ORGANS:  Uterus is present.  Left adnexal mixed attenuation lesion measuring  7.2 x 4.9 cm containing fat and calcification.     BOWEL:  Moderate fecal material seen throughout the colon.  Small and large  bowel loops are otherwise grossly unremarkable in appearance and  without evidence for bowel obstruction.  Appendix is within normal  limits.     VESSELS:  Coronary arterial calcification are noted.  Mild to moderate  atherosclerotic calcification of the thoracoabdominal aorta and branch  vessels.  Abdominal aorta is normal in caliber.  Extensive  atherosclerotic calcification of the abdominal aorta and branch  vessels.     PERITONEUM/RETROPERITONEUM/LYMPH NODES:  No free fluid.  No pneumoperitoneum.  No lymphadenopathy.     ABDOMINAL WALL:  No abnormalities identified.  SOFT TISSUES:   No abnormalities identified.     BONES:  Lucent lesions are again demonstrated throughout the visualized  osseous  structures compatible with known osseous metastatic disease.  Impression: Small bilateral pleural effusions with bibasilar compressive  atelectasis.  Additional patchy airspace opacities are seen primarily  involving the bilateral lower lobes and less significantly the upper  lobes raising suspicion for superimposed airspace disease.  Vague slightly low-attenuation lesions are seen in the right and left  hepatic lobes raising suspicion for hepatic metastases.  Punctate nonobstructive bilateral renal calculi.  No ureteral calculus  or evidence for obstructive uropathy.  Mixed attenuation left adnexal lesion containing fat and calcification  most compatible with dermoid cyst of the left ovary.  Need for  additional evaluation with dedicated pelvic ultrasound should be  determined clinically.  Scattered lucent lesions are seen throughout the visualized osseous  structures compatible with known osseous metastatic disease.  Signed by Massimo Welsh MD      Physical Exam    Relevant Results               Assessment/Plan        This patient has a central line   Reason for the central line remaining today?  End of life care    This patient has a urinary catheter   Reason for the urinary catheter remaining today? end-of-life care          Principal Problem:    Altered mental status, unspecified altered mental status type    Rene Hogan, A 83 y.o female with past medical history of recently admitted for acute hypoxic respiratory failure 2/2 Covid, Diabetic foot infection 2/2 Pseudomonas and MRSA, Breast cancer with Bone metastasis Left foot osteomyelitis s/p transmetatarsal infection,  Recurrent Fall, generalized weakness brought to the emergency room for hypercalcemia and altered mental status.       #Unresponsiveness   #Terminal illness  #DNRCC  Palliative care has ordered medications strictly for comfort care.   Family at bedside  Hospice consult for bereavement resources      Chronic Illnesses   #Acute Hypoxic  Respiratory Failure   # Acute Decompensated Heart Failure  #History of COPD( not in exacerbation)  #Paroxysmal Atrial Fibrillation   #Hypertension  #Breast cancer with Metastasis    #Type 2 DM    Dispo: Considering GIP if the patient stabilizes over the next few hours.                Lacy Chong, APRN-CNP

## 2023-11-09 NOTE — SIGNIFICANT EVENT
Visiting with the family when the patient stopped breathing.  No spontaneous movements were present. There was no response to verbal or tactile stimuli. Pupils were mid-dilated and fixed. No breath sounds were appreciated over either lung field. No carotid pulses were palpable. No heart sounds were auscultator over entire precordium. Patient pronounced dead 1559 on 11/9/23. Pt was recently made DNRCC.    independent

## 2023-11-09 NOTE — PROGRESS NOTES
Samira López is a 83 y.o. female on day 1 of admission presenting with Altered mental status, unspecified altered mental status type.      Subjective   Pt unable to speak. Nursing reporting that pt oxygen is low. They report comfort for pt s/p receiving hydromorphone.        Objective   General appearance: unresponsive, actively dying  HEENT: mmd  Skin: cool to the touch, pale  Heart: RRR in the 90's  Lungs: Irregular resps, no distress  Abdomen: soft, nt/nd, obese  Extremities: rash RLE, s/p R metatarsal amputation  Psych: No restlessness    Last Recorded Vitals  /56   Pulse 101   Temp 36.5 °C (97.7 °F) (Temporal)   Resp 11   Wt 73.1 kg (161 lb 2.5 oz)   SpO2 91%   Intake/Output last 3 Shifts:    Intake/Output Summary (Last 24 hours) at 11/9/2023 1206  Last data filed at 11/9/2023 0300  Gross per 24 hour   Intake --   Output 400 ml   Net -400 ml       Admission Weight  Weight: 82.4 kg (181 lb 10.5 oz) (11/07/23 2012)    Daily Weight  11/08/23 : 73.1 kg (161 lb 2.5 oz)    Image Results  XR foot left 3+ views  Narrative: STUDY:  Foot Radiographs; 11/07/2023 11:31 PM   INDICATION:  Concern for left foot osteomyelitis.  COMPARISON:  XR left foot 10/15/2023, 05/16/2023.    ACCESSION NUMBER(S):  [id number]  ORDERING CLINICIAN:  VALENTINO SOTELO  TECHNIQUE:  Three view(s) of the left foot.  FINDINGS:    There is no displaced fracture.  The alignment is anatomic.   Postsurgical change change transmetatarsal resection present.  Soft  tissue swelling along the stump seen.  No discrete osseous erosions  identified.    Impression: Stump soft tissue swelling without discrete erosion.  Consider MR  correlation.  Signed by Irvin Maciel MD  CT head wo IV contrast  Narrative: Interpreted By:  Madalyn Arias,   STUDY:  CT HEAD WO IV CONTRAST;  11/7/2023 11:22 pm      INDICATION:  AMS, oriented x0.      COMPARISON:  05/13/2023      ACCESSION NUMBER(S):  [id number]      ORDERING CLINICIAN:  CHUCK AVILA       TECHNIQUE:  Axial noncontrast CT images of the head.      FINDINGS:  BRAIN PARENCHYMA: Generalized parenchymal volume loss noted with  concordant ventricular enlargement. Non-specific white matter changes  noted, which may be related to small vessel disease.  No mass effect  or midline shift. Calcifications of the carotid arteries.      HEMORRHAGE: No acute intracranial hemorrhage.  VENTRICLES and EXTRA-AXIAL SPACES: The ventricles, sulci and basal  cisterns enlarged, concordant with parenchymal volume loss.  EXTRACRANIAL SOFT TISSUES: Within normal limits. PARANASAL  SINUSES/MASTOIDS: Mucosal thickening and fluid within the paranasal  sinuses predominately the maxillary and ethmoid sinuses. Mastoid air  cells are patent. CALVARIUM: No depressed skull fracture. No  destructive osseous lesion.      OTHER FINDINGS: Postsurgical changes of the globes.      Impression: No acute intracranial hemorrhage or mass effect.      Nonspecific white matter changes and parenchymal volume loss.      Paranasal sinus inflammatory changes.      MACRO:  None.      Signed by: Madalyn Arias 11/8/2023 12:30 AM  Dictation workstation:   [id number]  CT chest abdomen pelvis wo IV contrast  Narrative: STUDY:  CT Chest, Abdomen, and Pelvis without IV Contrast; 11/07/2023 11:25 PM  INDICATION:  Altered mental status, hypoxia.  COMPARISON:  XR chest 11/07/2023.  CT chest/abdomen/pelvis 05/13/2023,   ACCESSION NUMBER(S):  [id number]  ORDERING CLINICIAN:  VALENTINO SOTELO  TECHNIQUE:  CT of the chest, abdomen, and pelvis was performed.  Contiguous axial  images were obtained at 3 mm slice thickness through the chest,  abdomen, and pelvis.  Coronal and sagittal reconstructions at 3 mm  slice thickness were performed.  No intravenous contrast was  administered.    FINDINGS:  Please note that the evaluation of vessels, lymph nodes and organs is  limited without intravenous contrast.   CHEST:  MEDIASTINUM:  The heart is normal in size without  pericardial effusion.  Right-sided  PICC line is in place.  LUNGS/PLEURA:  Small bilateral pleural effusions are demonstrated with bibasilar  areas of compressive atelectasis.  There is no pleural thickening, or  pneumothorax.  The airways are patent.  In addition there are patchy airspace opacities primarily involving  bilateral lower lobes suspicious for superimposed airspace disease.   Minimal patchy opacity also seen in the upper lobes bilaterally.   Calcified granuloma measuring 1.4 cm in the right upper lobe.  LYMPH NODES:  Thoracic lymph nodes are not enlarged.  ABDOMEN:     LIVER:  Several vague slightly low-attenuation lesions are suggested within  the liver, raising suspicion for metastatic disease, measuring up to  2.8 cm in the right hepatic lobe.     BILE DUCTS:  No intrahepatic or extrahepatic biliary ductal dilatation.     GALLBLADDER:  Gallbladder is absent.  STOMACH:  No abnormalities identified.     PANCREAS:  No masses or ductal dilatation.     SPLEEN:  No splenomegaly or focal splenic lesion.     ADRENAL GLANDS:  No thickening or nodules.     KIDNEYS AND URETERS:  Kidneys are normal in size and location.  Punctate nonobstructive  bilateral renal calculi.  No ureteral calculus or hydronephrosis.     PELVIS:     BLADDER:  No abnormalities identified.     REPRODUCTIVE ORGANS:  Uterus is present.  Left adnexal mixed attenuation lesion measuring  7.2 x 4.9 cm containing fat and calcification.     BOWEL:  Moderate fecal material seen throughout the colon.  Small and large  bowel loops are otherwise grossly unremarkable in appearance and  without evidence for bowel obstruction.  Appendix is within normal  limits.     VESSELS:  Coronary arterial calcification are noted.  Mild to moderate  atherosclerotic calcification of the thoracoabdominal aorta and branch  vessels.  Abdominal aorta is normal in caliber.  Extensive  atherosclerotic calcification of the abdominal aorta and branch  vessels.      PERITONEUM/RETROPERITONEUM/LYMPH NODES:  No free fluid.  No pneumoperitoneum.  No lymphadenopathy.     ABDOMINAL WALL:  No abnormalities identified.  SOFT TISSUES:   No abnormalities identified.     BONES:  Lucent lesions are again demonstrated throughout the visualized  osseous structures compatible with known osseous metastatic disease.  Impression: Small bilateral pleural effusions with bibasilar compressive  atelectasis.  Additional patchy airspace opacities are seen primarily  involving the bilateral lower lobes and less significantly the upper  lobes raising suspicion for superimposed airspace disease.  Vague slightly low-attenuation lesions are seen in the right and left  hepatic lobes raising suspicion for hepatic metastases.  Punctate nonobstructive bilateral renal calculi.  No ureteral calculus  or evidence for obstructive uropathy.  Mixed attenuation left adnexal lesion containing fat and calcification  most compatible with dermoid cyst of the left ovary.  Need for  additional evaluation with dedicated pelvic ultrasound should be  determined clinically.  Scattered lucent lesions are seen throughout the visualized osseous  structures compatible with known osseous metastatic disease.  Signed by Massimo Welsh MD      Physical Exam    Relevant Results      Assessment/Plan    This is a terminally ill actively dying pt 2/2 to acute hypoxic respiratory failure that is complicated by hypercalcemia 2/2 breast cancer w mets. The family has elected comfort care only for this patient. Life expectancy is minutes to hours (immenent).     VS - Pulse ox 33%, other VSS at this time and pt is comfortable s/p comfort meds today   Continue comfort care  Did decrease 02 based on pt comfort   If pt stable later today, will consider involving hospice   Calming music turned on  Palliative SW and TCC at bedside being present w pt prior to sons arrival today    Son is now at bedside right after this encounter and did provide him  update     Updated primary team   ----------------------------------------------------------------------  Update at 1500  Pt had escalating yousif EOL symptom of terminal secretions  Did reassess, upgraded secretion mgmt to scopolamine patch and repositioned pt  RR noted to be 14 and irregular but without distress  Education provided to son about symptom and mgmt  Oxygen removed with sons permission   Death remains immanent in minute to hours      Sheila Camarillo, APRN-CNP

## 2023-11-12 LAB
BACTERIA BLD AEROBE CULT: ABNORMAL
BACTERIA BLD AEROBE CULT: ABNORMAL
BACTERIA BLD CULT: ABNORMAL
BACTERIA BLD CULT: ABNORMAL
GRAM STN SPEC: ABNORMAL
GRAM STN SPEC: ABNORMAL

## 2023-11-15 LAB — PTH RELATED PROT SERPL-SCNC: 5.3 PMOL/L

## 2023-11-29 ENCOUNTER — APPOINTMENT (OUTPATIENT)
Dept: HEMATOLOGY/ONCOLOGY | Facility: CLINIC | Age: 83
End: 2023-11-29
Payer: MEDICARE

## 2023-12-06 NOTE — DISCHARGE SUMMARY
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, Breast cancer with Bone metastasis Left foot osteomyelitis s/p transmetatarsal infection,  AMS     Respiratory arrest     Issues Requiring Follow-Up  None, patient      Discharge Meds     Your medication list        ASK your doctor about these medications        Instructions Last Dose Given Next Dose Due   acetaminophen 325 mg tablet  Commonly known as: Tylenol           apixaban 2.5 mg tablet  Commonly known as: Eliquis           ascorbic acid 500 mg tablet  Commonly known as: Vitamin C           aspirin 81 mg chewable tablet  Commonly known as: Aspirin Childrens      Chew 1 tablet (81 mg) once daily.       atorvastatin 40 mg tablet  Commonly known as: Lipitor           cholecalciferol 10 mcg (400 unit) tablet,chewable  Commonly known as: Vitamin D-3      Chew 2 tablets (20 mcg) once daily.       dextromethorphan-guaifenesin  mg/5 mL oral liquid  Commonly known as: Robitussin DM      Take 5 mL by mouth every 4 hours if needed for cough.       diphenhydrAMINE 25 mg tablet  Commonly known as: Sominex           dorzolamide 2 % ophthalmic solution  Commonly known as: Trusopt      Administer 1 drop into both eyes 2 times a day.       exemestane 25 mg tablet  Commonly known as: Aromasin      Take 1 tablet (25 mg total) by mouth once daily.  Take after a meal.  Try to take at the same time each day.       ferrous sulfate 325 (65 Fe) MG tablet           glucagon 1 mg injection  Commonly known as: Glucagen      Inject 1 mg into the muscle every 15 minutes if needed for low blood sugar - see comments (For blood glucose less than or equal to 70 mg/dL and no IV access).       guaiFENesin 600 mg 12 hr tablet  Commonly known as: Mucinex      Take 1 tablet (600 mg) by mouth every 12 hours if needed for congestion. Do not crush, chew, or split.       hydrocortisone 1 % cream           hydrOXYzine HCL 25  mg tablet  Commonly known as: Atarax           insulin lispro 100 unit/mL injection  Commonly known as: HumaLOG      Inject 0-0.15 mL (0-15 Units) under the skin 3 times a day with meals. Take as directed per insulin instructions.       ipratropium-albuteroL 0.5-2.5 mg/3 mL nebulizer solution  Commonly known as: Duo-Neb      Take 3 mL by nebulization 3 times a day.       ketorolac 0.5 % ophthalmic solution  Commonly known as: Acular      Administer 1 drop into both eyes 4 times a day.       latanoprost 0.005 % ophthalmic solution  Commonly known as: Xalatan      INSTILL  1 DROP INTO BOTH EYES EVERY DAY AT BEDTIME       metoprolol tartrate 25 mg tablet  Commonly known as: Lopressor      Take 1 tablet (25 mg) by mouth 2 times a day.       omeprazole 20 mg DR capsule  Commonly known as: PriLOSEC           oxybutynin XL 10 mg 24 hr tablet  Commonly known as: Ditropan-XL           oxyCODONE 5 mg immediate release tablet  Commonly known as: Roxicodone      Take 1 tablet (5 mg) by mouth every 4 hours if needed for moderate pain (4 - 6).       oxygen gas therapy  Commonly known as: O2      Inhale 3 L/min continuously.       polyethylene glycol 17 gram packet  Commonly known as: Glycolax, Miralax           rosuvastatin 20 mg tablet  Commonly known as: Crestor      Take 1 tablet (20 mg) by mouth once daily.       tiZANidine 2 mg capsule  Commonly known as: Zanaflex                    Test Results Pending At Discharge  Pending Labs       No current pending labs.            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  History was obtain from patient's nurse as  patient was unresponsive on examination. Patient nurse states that patient was hypoxic yesterday requiring 8 liters of oxygen. Baseline oxygen is 3 liters. She states that patient is normally alert and oriented X3 but within 24-48 hours. She has noticed decline in mentation. The patient was admitted to the ICU for further treatment. The admitting provider spoke with the patient's  and he decided to change the code status to DNRCC only, so she was transferred to the floor.      While admitted, she was unresponsive with long periods of apnea. Family informed that she likely had hours before passing away. The patient was expected to pass away before hospice could come to evaluate the patient, so a consult was added for bereavement resources. Palliative care was at the bedside with Dr. Avilez to discuss the patient's prognosis. Medications were added to keep the patient comfortable. The patient  on 23 at 1559.          Pertinent Physical Exam At Time of Discharge  Physical Exam  Constitutional:       Comments: Unresponsive   Eyes:      Comments: Fixed and dilated    Cardiovascular:      Comments: Heart sounds and pulses are absent  Pulmonary:      Comments: Absent respirations        Outpatient Follow-Up  No future appointments.      Lacy Chong, APRN-CNP

## 2023-12-27 ENCOUNTER — APPOINTMENT (OUTPATIENT)
Dept: HEMATOLOGY/ONCOLOGY | Facility: CLINIC | Age: 83
End: 2023-12-27
Payer: MEDICARE

## 2024-01-24 ENCOUNTER — APPOINTMENT (OUTPATIENT)
Dept: HEMATOLOGY/ONCOLOGY | Facility: CLINIC | Age: 84
End: 2024-01-24
Payer: MEDICARE

## 2024-02-14 ENCOUNTER — APPOINTMENT (OUTPATIENT)
Dept: OPHTHALMOLOGY | Facility: CLINIC | Age: 84
End: 2024-02-14
Payer: MEDICARE

## 2024-02-21 ENCOUNTER — APPOINTMENT (OUTPATIENT)
Dept: HEMATOLOGY/ONCOLOGY | Facility: CLINIC | Age: 84
End: 2024-02-21
Payer: MEDICARE

## 2024-03-20 ENCOUNTER — APPOINTMENT (OUTPATIENT)
Dept: HEMATOLOGY/ONCOLOGY | Facility: CLINIC | Age: 84
End: 2024-03-20
Payer: MEDICARE

## 2024-04-17 ENCOUNTER — APPOINTMENT (OUTPATIENT)
Dept: HEMATOLOGY/ONCOLOGY | Facility: CLINIC | Age: 84
End: 2024-04-17
Payer: MEDICARE

## 2024-05-15 ENCOUNTER — APPOINTMENT (OUTPATIENT)
Dept: HEMATOLOGY/ONCOLOGY | Facility: CLINIC | Age: 84
End: 2024-05-15
Payer: MEDICARE

## 2024-06-12 ENCOUNTER — APPOINTMENT (OUTPATIENT)
Dept: HEMATOLOGY/ONCOLOGY | Facility: CLINIC | Age: 84
End: 2024-06-12
Payer: MEDICARE

## 2024-07-10 ENCOUNTER — APPOINTMENT (OUTPATIENT)
Dept: HEMATOLOGY/ONCOLOGY | Facility: CLINIC | Age: 84
End: 2024-07-10
Payer: MEDICARE